# Patient Record
Sex: FEMALE | Race: BLACK OR AFRICAN AMERICAN | NOT HISPANIC OR LATINO | Employment: UNEMPLOYED | URBAN - METROPOLITAN AREA
[De-identification: names, ages, dates, MRNs, and addresses within clinical notes are randomized per-mention and may not be internally consistent; named-entity substitution may affect disease eponyms.]

---

## 2014-08-22 LAB
CRC RECOMMENDATION EXT: NORMAL
CRC RECOMMENDATION EXT: NORMAL

## 2017-01-13 ENCOUNTER — HISTORICAL (OUTPATIENT)
Dept: RADIOLOGY | Facility: HOSPITAL | Age: 57
End: 2017-01-13

## 2017-01-19 ENCOUNTER — HISTORICAL (OUTPATIENT)
Dept: LAB | Facility: HOSPITAL | Age: 57
End: 2017-01-19

## 2017-01-23 ENCOUNTER — HISTORICAL (OUTPATIENT)
Dept: SURGERY | Facility: HOSPITAL | Age: 57
End: 2017-01-23

## 2017-06-25 LAB
COLOR STL: NORMAL
CONSISTENCY STL: NORMAL

## 2017-06-26 ENCOUNTER — HISTORICAL (OUTPATIENT)
Dept: INTERNAL MEDICINE | Facility: CLINIC | Age: 57
End: 2017-06-26

## 2017-06-26 LAB
ABS NEUT (OLG): 4.16 X10(3)/MCL (ref 2.1–9.2)
ALBUMIN SERPL-MCNC: 4.3 GM/DL (ref 3.4–5)
ALBUMIN/GLOB SERPL: 1 RATIO (ref 1–2)
ALP SERPL-CCNC: 65 UNIT/L (ref 20–120)
ALT SERPL-CCNC: 17 UNIT/L
AST SERPL-CCNC: 21 UNIT/L
BASOPHILS # BLD AUTO: 0.06 X10(3)/MCL
BASOPHILS NFR BLD AUTO: 1 % (ref 0–1)
BILIRUB SERPL-MCNC: 0.6 MG/DL
BILIRUBIN DIRECT+TOT PNL SERPL-MCNC: 0.1 MG/DL
BILIRUBIN DIRECT+TOT PNL SERPL-MCNC: 0.5 MG/DL
BUN SERPL-MCNC: 14 MG/DL (ref 7–25)
CALCIUM SERPL-MCNC: 9.4 MG/DL (ref 8.4–10.3)
CHLORIDE SERPL-SCNC: 99 MMOL/L (ref 96–110)
CHOLEST SERPL-MCNC: 201 MG/DL
CHOLEST/HDLC SERPL: 3.6 {RATIO} (ref 0–4.4)
CO2 SERPL-SCNC: 27 MMOL/L (ref 24–32)
CREAT SERPL-MCNC: 1.06 MG/DL (ref 0.7–1.1)
EOSINOPHIL # BLD AUTO: 0.05 X10(3)/MCL
EOSINOPHIL NFR BLD AUTO: 1 % (ref 0–5)
ERYTHROCYTE [DISTWIDTH] IN BLOOD BY AUTOMATED COUNT: 12.1 % (ref 11.5–14.5)
EST. AVERAGE GLUCOSE BLD GHB EST-MCNC: 217 MG/DL
GLOBULIN SER-MCNC: 3.7 GM/ML (ref 2.3–3.5)
GLUCOSE SERPL-MCNC: 141 MG/DL (ref 65–99)
HBA1C MFR BLD: 9.2 % (ref 4.7–5.6)
HCT VFR BLD AUTO: 38 % (ref 35–46)
HDLC SERPL-MCNC: 56 MG/DL
HGB BLD-MCNC: 12.6 GM/DL (ref 12–16)
HIV 1+2 AB+HIV1 P24 AG SERPL QL IA: NONREACTIVE
IMM GRANULOCYTES # BLD AUTO: 0.02 10*3/UL
IMM GRANULOCYTES NFR BLD AUTO: 0 %
LDLC SERPL CALC-MCNC: 122 MG/DL (ref 0–130)
LYMPHOCYTES # BLD AUTO: 3.33 X10(3)/MCL
LYMPHOCYTES NFR BLD AUTO: 41 % (ref 15–40)
MCH RBC QN AUTO: 28.4 PG (ref 26–34)
MCHC RBC AUTO-ENTMCNC: 33.2 GM/DL (ref 31–37)
MCV RBC AUTO: 85.6 FL (ref 80–100)
MONOCYTES # BLD AUTO: 0.5 X10(3)/MCL
MONOCYTES NFR BLD AUTO: 6 % (ref 4–12)
NEUTROPHILS # BLD AUTO: 4.16 X10(3)/MCL
NEUTROPHILS NFR BLD AUTO: 51 X10(3)/MCL
PLATELET # BLD AUTO: 358 X10(3)/MCL (ref 130–400)
PMV BLD AUTO: 10.6 FL (ref 7.4–10.4)
POTASSIUM SERPL-SCNC: 3.7 MMOL/L (ref 3.6–5.2)
PROT SERPL-MCNC: 8 GM/DL (ref 6–8)
RBC # BLD AUTO: 4.44 X10(6)/MCL (ref 4–5.2)
SODIUM SERPL-SCNC: 135 MMOL/L (ref 135–146)
TRIGL SERPL-MCNC: 115 MG/DL
TSH SERPL-ACNC: 0.83 MIU/L (ref 0.5–5)
VLDLC SERPL CALC-MCNC: 23 MG/DL
WBC # SPEC AUTO: 8.1 X10(3)/MCL (ref 4.5–11)

## 2018-01-04 ENCOUNTER — HISTORICAL (OUTPATIENT)
Dept: INTERNAL MEDICINE | Facility: CLINIC | Age: 58
End: 2018-01-04

## 2018-01-04 LAB
ABS NEUT (OLG): 3.91 X10(3)/MCL (ref 2.1–9.2)
ALBUMIN SERPL-MCNC: 3.8 GM/DL (ref 3.4–5)
ALBUMIN/GLOB SERPL: 1 RATIO (ref 1–2)
ALP SERPL-CCNC: 73 UNIT/L (ref 45–117)
ALT SERPL-CCNC: 22 UNIT/L (ref 12–78)
AST SERPL-CCNC: 18 UNIT/L (ref 15–37)
BASOPHILS # BLD AUTO: 0.05 X10(3)/MCL
BASOPHILS NFR BLD AUTO: 1 % (ref 0–1)
BILIRUB SERPL-MCNC: 0.3 MG/DL (ref 0.2–1)
BILIRUBIN DIRECT+TOT PNL SERPL-MCNC: <0.1 MG/DL
BILIRUBIN DIRECT+TOT PNL SERPL-MCNC: ABNORMAL MG/DL
BUN SERPL-MCNC: 11 MG/DL (ref 7–18)
CALCIUM SERPL-MCNC: 8.9 MG/DL (ref 8.5–10.1)
CHLORIDE SERPL-SCNC: 100 MMOL/L (ref 98–107)
CHOLEST SERPL-MCNC: 169 MG/DL
CHOLEST/HDLC SERPL: 2.5 {RATIO} (ref 0–4.4)
CO2 SERPL-SCNC: 29 MMOL/L (ref 21–32)
CREAT SERPL-MCNC: 1 MG/DL (ref 0.6–1.3)
EOSINOPHIL # BLD AUTO: 0.15 X10(3)/MCL
EOSINOPHIL NFR BLD AUTO: 2 % (ref 0–5)
ERYTHROCYTE [DISTWIDTH] IN BLOOD BY AUTOMATED COUNT: 12.3 % (ref 11.5–14.5)
EST. AVERAGE GLUCOSE BLD GHB EST-MCNC: 171 MG/DL
GLOBULIN SER-MCNC: 4.5 GM/ML (ref 2.3–3.5)
GLUCOSE SERPL-MCNC: 114 MG/DL (ref 74–106)
HBA1C MFR BLD: 7.6 % (ref 4.2–6.3)
HCT VFR BLD AUTO: 37.9 % (ref 35–46)
HDLC SERPL-MCNC: 68 MG/DL
HGB BLD-MCNC: 12.3 GM/DL (ref 12–16)
IMM GRANULOCYTES # BLD AUTO: 0.02 10*3/UL
IMM GRANULOCYTES NFR BLD AUTO: 0 %
LDLC SERPL CALC-MCNC: 80 MG/DL (ref 0–130)
LYMPHOCYTES # BLD AUTO: 3.34 X10(3)/MCL
LYMPHOCYTES NFR BLD AUTO: 42 % (ref 15–40)
MCH RBC QN AUTO: 28.7 PG (ref 26–34)
MCHC RBC AUTO-ENTMCNC: 32.5 GM/DL (ref 31–37)
MCV RBC AUTO: 88.3 FL (ref 80–100)
MONOCYTES # BLD AUTO: 0.54 X10(3)/MCL
MONOCYTES NFR BLD AUTO: 7 % (ref 4–12)
NEUTROPHILS # BLD AUTO: 3.91 X10(3)/MCL
NEUTROPHILS NFR BLD AUTO: 49 X10(3)/MCL
PLATELET # BLD AUTO: 348 X10(3)/MCL (ref 130–400)
PMV BLD AUTO: 10.9 FL (ref 7.4–10.4)
POTASSIUM SERPL-SCNC: 3.5 MMOL/L (ref 3.5–5.1)
PROT SERPL-MCNC: 8.3 GM/DL (ref 6.4–8.2)
RBC # BLD AUTO: 4.29 X10(6)/MCL (ref 4–5.2)
SODIUM SERPL-SCNC: 138 MMOL/L (ref 136–145)
TRIGL SERPL-MCNC: 106 MG/DL
TSH SERPL-ACNC: 0.76 MIU/L (ref 0.36–3.74)
VLDLC SERPL CALC-MCNC: 21 MG/DL
WBC # SPEC AUTO: 8 X10(3)/MCL (ref 4.5–11)

## 2018-02-07 ENCOUNTER — HISTORICAL (OUTPATIENT)
Dept: INTERNAL MEDICINE | Facility: CLINIC | Age: 58
End: 2018-02-07

## 2018-06-29 ENCOUNTER — HISTORICAL (OUTPATIENT)
Dept: INTERNAL MEDICINE | Facility: CLINIC | Age: 58
End: 2018-06-29

## 2018-06-29 LAB
ABS NEUT (OLG): 4.01 X10(3)/MCL (ref 2.1–9.2)
ALBUMIN SERPL-MCNC: 4 GM/DL (ref 3.4–5)
ALBUMIN/GLOB SERPL: 1 RATIO (ref 1–2)
ALP SERPL-CCNC: 63 UNIT/L (ref 45–117)
ALT SERPL-CCNC: 23 UNIT/L (ref 12–78)
AST SERPL-CCNC: 18 UNIT/L (ref 15–37)
BASOPHILS # BLD AUTO: 0.05 X10(3)/MCL
BASOPHILS NFR BLD AUTO: 1 %
BILIRUB SERPL-MCNC: 0.4 MG/DL (ref 0.2–1)
BILIRUBIN DIRECT+TOT PNL SERPL-MCNC: 0.1 MG/DL
BILIRUBIN DIRECT+TOT PNL SERPL-MCNC: 0.3 MG/DL
BUN SERPL-MCNC: 13 MG/DL (ref 7–18)
CALCIUM SERPL-MCNC: 9.3 MG/DL (ref 8.5–10.1)
CHLORIDE SERPL-SCNC: 100 MMOL/L (ref 98–107)
CHOLEST SERPL-MCNC: 176 MG/DL
CHOLEST/HDLC SERPL: 3 {RATIO} (ref 0–4.4)
CO2 SERPL-SCNC: 29 MMOL/L (ref 21–32)
CREAT SERPL-MCNC: 1.1 MG/DL (ref 0.6–1.3)
EOSINOPHIL # BLD AUTO: 0.19 X10(3)/MCL
EOSINOPHIL NFR BLD AUTO: 2 %
ERYTHROCYTE [DISTWIDTH] IN BLOOD BY AUTOMATED COUNT: 12.5 % (ref 11.5–14.5)
EST. AVERAGE GLUCOSE BLD GHB EST-MCNC: 154 MG/DL
GLOBULIN SER-MCNC: 4.2 GM/ML (ref 2.3–3.5)
GLUCOSE SERPL-MCNC: 110 MG/DL (ref 74–106)
HBA1C MFR BLD: 7 % (ref 4.2–6.3)
HCT VFR BLD AUTO: 39.3 % (ref 35–46)
HDLC SERPL-MCNC: 59 MG/DL
HGB BLD-MCNC: 13 GM/DL (ref 12–16)
IMM GRANULOCYTES # BLD AUTO: 0.02 10*3/UL
IMM GRANULOCYTES NFR BLD AUTO: 0 %
LDLC SERPL CALC-MCNC: 98 MG/DL (ref 0–130)
LYMPHOCYTES # BLD AUTO: 3.94 X10(3)/MCL
LYMPHOCYTES NFR BLD AUTO: 45 % (ref 13–40)
MCH RBC QN AUTO: 28.8 PG (ref 26–34)
MCHC RBC AUTO-ENTMCNC: 33.1 GM/DL (ref 31–37)
MCV RBC AUTO: 86.9 FL (ref 80–100)
MONOCYTES # BLD AUTO: 0.58 X10(3)/MCL
MONOCYTES NFR BLD AUTO: 7 % (ref 4–12)
NEUTROPHILS # BLD AUTO: 4.01 X10(3)/MCL
NEUTROPHILS NFR BLD AUTO: 46 X10(3)/MCL
PLATELET # BLD AUTO: 343 X10(3)/MCL (ref 130–400)
PMV BLD AUTO: 9.9 FL (ref 7.4–10.4)
POTASSIUM SERPL-SCNC: 3.8 MMOL/L (ref 3.5–5.1)
PROT SERPL-MCNC: 8.2 GM/DL (ref 6.4–8.2)
RBC # BLD AUTO: 4.52 X10(6)/MCL (ref 4–5.2)
SODIUM SERPL-SCNC: 134 MMOL/L (ref 136–145)
TRIGL SERPL-MCNC: 93 MG/DL
TSH SERPL-ACNC: 1.14 MIU/L (ref 0.36–3.74)
VLDLC SERPL CALC-MCNC: 19 MG/DL
WBC # SPEC AUTO: 8.8 X10(3)/MCL (ref 4.5–11)

## 2019-01-02 ENCOUNTER — HISTORICAL (OUTPATIENT)
Dept: INTERNAL MEDICINE | Facility: CLINIC | Age: 59
End: 2019-01-02

## 2019-01-02 LAB
ABS NEUT (OLG): 4.17 X10(3)/MCL (ref 2.1–9.2)
ALBUMIN SERPL-MCNC: 3.7 GM/DL (ref 3.4–5)
ALBUMIN/GLOB SERPL: 1 RATIO (ref 1–2)
ALP SERPL-CCNC: 62 UNIT/L (ref 45–117)
ALT SERPL-CCNC: 20 UNIT/L (ref 12–78)
AST SERPL-CCNC: 17 UNIT/L (ref 15–37)
BASOPHILS # BLD AUTO: 0.05 X10(3)/MCL
BASOPHILS NFR BLD AUTO: 1 %
BILIRUB SERPL-MCNC: 0.4 MG/DL (ref 0.2–1)
BILIRUBIN DIRECT+TOT PNL SERPL-MCNC: 0.1 MG/DL
BILIRUBIN DIRECT+TOT PNL SERPL-MCNC: 0.3 MG/DL
BUN SERPL-MCNC: 15 MG/DL (ref 7–18)
CALCIUM SERPL-MCNC: 8.9 MG/DL (ref 8.5–10.1)
CHLORIDE SERPL-SCNC: 99 MMOL/L (ref 98–107)
CHOLEST SERPL-MCNC: 206 MG/DL
CHOLEST/HDLC SERPL: 3.4 {RATIO} (ref 0–4.4)
CO2 SERPL-SCNC: 28 MMOL/L (ref 21–32)
CREAT SERPL-MCNC: 1 MG/DL (ref 0.6–1.3)
EOSINOPHIL # BLD AUTO: 0.14 10*3/UL
EOSINOPHIL NFR BLD AUTO: 2 %
ERYTHROCYTE [DISTWIDTH] IN BLOOD BY AUTOMATED COUNT: 12.1 % (ref 11.5–14.5)
EST. AVERAGE GLUCOSE BLD GHB EST-MCNC: 163 MG/DL
GLOBULIN SER-MCNC: 4.5 GM/ML (ref 2.3–3.5)
GLUCOSE SERPL-MCNC: 131 MG/DL (ref 74–106)
HBA1C MFR BLD: 7.3 % (ref 4.2–6.3)
HCT VFR BLD AUTO: 38 % (ref 35–46)
HDLC SERPL-MCNC: 60 MG/DL
HGB BLD-MCNC: 12 GM/DL (ref 12–16)
IMM GRANULOCYTES # BLD AUTO: 0.03 10*3/UL
IMM GRANULOCYTES NFR BLD AUTO: 0 %
LDLC SERPL CALC-MCNC: 124 MG/DL (ref 0–130)
LYMPHOCYTES # BLD AUTO: 3.33 X10(3)/MCL
LYMPHOCYTES NFR BLD AUTO: 39 % (ref 13–40)
MCH RBC QN AUTO: 27.8 PG (ref 26–34)
MCHC RBC AUTO-ENTMCNC: 31.6 GM/DL (ref 31–37)
MCV RBC AUTO: 88 FL (ref 80–100)
MONOCYTES # BLD AUTO: 0.79 X10(3)/MCL
MONOCYTES NFR BLD AUTO: 9 % (ref 4–12)
NEUTROPHILS # BLD AUTO: 4.17 X10(3)/MCL
NEUTROPHILS NFR BLD AUTO: 49 X10(3)/MCL
PLATELET # BLD AUTO: 329 X10(3)/MCL (ref 130–400)
PMV BLD AUTO: 10.6 FL (ref 7.4–10.4)
POTASSIUM SERPL-SCNC: 3.6 MMOL/L (ref 3.5–5.1)
PROT SERPL-MCNC: 8.2 GM/DL (ref 6.4–8.2)
RBC # BLD AUTO: 4.32 X10(6)/MCL (ref 4–5.2)
SODIUM SERPL-SCNC: 135 MMOL/L (ref 136–145)
TRIGL SERPL-MCNC: 108 MG/DL
TSH SERPL-ACNC: 1.43 MIU/L (ref 0.36–3.74)
VLDLC SERPL CALC-MCNC: 22 MG/DL
WBC # SPEC AUTO: 8.5 X10(3)/MCL (ref 4.5–11)

## 2019-01-09 ENCOUNTER — HISTORICAL (OUTPATIENT)
Dept: ADMINISTRATIVE | Facility: HOSPITAL | Age: 59
End: 2019-01-09

## 2019-02-20 ENCOUNTER — HISTORICAL (OUTPATIENT)
Dept: CARDIOLOGY | Facility: HOSPITAL | Age: 59
End: 2019-02-20

## 2019-06-24 ENCOUNTER — HISTORICAL (OUTPATIENT)
Dept: INTERNAL MEDICINE | Facility: CLINIC | Age: 59
End: 2019-06-24

## 2019-06-24 LAB
ABS NEUT (OLG): 3.34 X10(3)/MCL (ref 2.1–9.2)
ALBUMIN SERPL-MCNC: 4 GM/DL (ref 3.4–5)
ALBUMIN/GLOB SERPL: 1.1 RATIO (ref 1.1–2)
ALP SERPL-CCNC: 55 UNIT/L (ref 45–117)
ALT SERPL-CCNC: 22 UNIT/L (ref 12–78)
AST SERPL-CCNC: 17 UNIT/L (ref 15–37)
BASOPHILS # BLD AUTO: 0.05 X10(3)/MCL
BASOPHILS NFR BLD AUTO: 1 %
BILIRUB SERPL-MCNC: 0.4 MG/DL (ref 0.2–1)
BILIRUBIN DIRECT+TOT PNL SERPL-MCNC: 0.1 MG/DL
BILIRUBIN DIRECT+TOT PNL SERPL-MCNC: 0.3 MG/DL
BUN SERPL-MCNC: 11 MG/DL (ref 7–18)
CALCIUM SERPL-MCNC: 9 MG/DL (ref 8.5–10.1)
CHLORIDE SERPL-SCNC: 104 MMOL/L (ref 98–107)
CHOLEST SERPL-MCNC: 190 MG/DL
CHOLEST/HDLC SERPL: 3.7 {RATIO} (ref 0–4.4)
CO2 SERPL-SCNC: 29 MMOL/L (ref 21–32)
CREAT SERPL-MCNC: 1 MG/DL (ref 0.6–1.3)
EOSINOPHIL # BLD AUTO: 0.14 10*3/UL
EOSINOPHIL NFR BLD AUTO: 2 %
ERYTHROCYTE [DISTWIDTH] IN BLOOD BY AUTOMATED COUNT: 12.3 % (ref 11.5–14.5)
EST. AVERAGE GLUCOSE BLD GHB EST-MCNC: 183 MG/DL
GLOBULIN SER-MCNC: 3.8 GM/ML (ref 2.3–3.5)
GLUCOSE SERPL-MCNC: 95 MG/DL (ref 74–106)
HBA1C MFR BLD: 8 % (ref 4.2–6.3)
HCT VFR BLD AUTO: 38.6 % (ref 35–46)
HDLC SERPL-MCNC: 51 MG/DL
HGB BLD-MCNC: 12.5 GM/DL (ref 12–16)
IMM GRANULOCYTES # BLD AUTO: 0.02 10*3/UL
IMM GRANULOCYTES NFR BLD AUTO: 0 %
LDLC SERPL CALC-MCNC: 112 MG/DL (ref 0–130)
LYMPHOCYTES # BLD AUTO: 3.51 X10(3)/MCL
LYMPHOCYTES NFR BLD AUTO: 46 % (ref 13–40)
MCH RBC QN AUTO: 28.5 PG (ref 26–34)
MCHC RBC AUTO-ENTMCNC: 32.4 GM/DL (ref 31–37)
MCV RBC AUTO: 88.1 FL (ref 80–100)
MONOCYTES # BLD AUTO: 0.58 X10(3)/MCL
MONOCYTES NFR BLD AUTO: 8 % (ref 4–12)
NEUTROPHILS # BLD AUTO: 3.34 X10(3)/MCL
NEUTROPHILS NFR BLD AUTO: 44 X10(3)/MCL
PLATELET # BLD AUTO: 334 X10(3)/MCL (ref 130–400)
PMV BLD AUTO: 10.5 FL (ref 7.4–10.4)
POTASSIUM SERPL-SCNC: 3.9 MMOL/L (ref 3.5–5.1)
PROT SERPL-MCNC: 7.8 GM/DL (ref 6.4–8.2)
RBC # BLD AUTO: 4.38 X10(6)/MCL (ref 4–5.2)
SODIUM SERPL-SCNC: 137 MMOL/L (ref 136–145)
TRIGL SERPL-MCNC: 136 MG/DL
TSH SERPL-ACNC: 0.52 MIU/L (ref 0.36–3.74)
VLDLC SERPL CALC-MCNC: 27 MG/DL
WBC # SPEC AUTO: 7.6 X10(3)/MCL (ref 4.5–11)

## 2019-08-01 ENCOUNTER — HISTORICAL (OUTPATIENT)
Dept: INTERNAL MEDICINE | Facility: CLINIC | Age: 59
End: 2019-08-01

## 2019-08-01 LAB
CREAT UR-MCNC: 169 MG/DL
MICROALBUMIN UR-MCNC: 9.3 MG/L (ref 0–19)
MICROALBUMIN/CREAT RATIO PNL UR: 5.5 MCG/MG CR (ref 0–29)

## 2020-01-27 ENCOUNTER — HISTORICAL (OUTPATIENT)
Dept: INTERNAL MEDICINE | Facility: CLINIC | Age: 60
End: 2020-01-27

## 2020-01-27 LAB
ABS NEUT (OLG): 4.86 X10(3)/MCL (ref 2.1–9.2)
ALBUMIN SERPL-MCNC: 3.7 GM/DL (ref 3.4–5)
ALBUMIN/GLOB SERPL: 0.9 RATIO (ref 1.1–2)
ALP SERPL-CCNC: 63 UNIT/L (ref 45–117)
ALT SERPL-CCNC: 19 UNIT/L (ref 12–78)
AST SERPL-CCNC: 12 UNIT/L (ref 15–37)
BASOPHILS # BLD AUTO: 0.1 X10(3)/MCL (ref 0–0.2)
BASOPHILS NFR BLD AUTO: 1 %
BILIRUB SERPL-MCNC: 0.4 MG/DL (ref 0.2–1)
BILIRUBIN DIRECT+TOT PNL SERPL-MCNC: 0.1 MG/DL (ref 0–0.2)
BILIRUBIN DIRECT+TOT PNL SERPL-MCNC: 0.3 MG/DL
BUN SERPL-MCNC: 16 MG/DL (ref 7–18)
CALCIUM SERPL-MCNC: 9.6 MG/DL (ref 8.5–10.1)
CHLORIDE SERPL-SCNC: 99 MMOL/L (ref 98–107)
CHOLEST SERPL-MCNC: 204 MG/DL
CHOLEST/HDLC SERPL: 3.8 {RATIO} (ref 0–4.4)
CO2 SERPL-SCNC: 27 MMOL/L (ref 21–32)
CREAT SERPL-MCNC: 1.1 MG/DL (ref 0.6–1.3)
EOSINOPHIL # BLD AUTO: 0.2 X10(3)/MCL (ref 0–0.9)
EOSINOPHIL NFR BLD AUTO: 2 %
ERYTHROCYTE [DISTWIDTH] IN BLOOD BY AUTOMATED COUNT: 12.2 % (ref 11.5–14.5)
EST. AVERAGE GLUCOSE BLD GHB EST-MCNC: 194 MG/DL
GLOBULIN SER-MCNC: 4.1 GM/ML (ref 2.3–3.5)
GLUCOSE SERPL-MCNC: 231 MG/DL (ref 74–106)
HBA1C MFR BLD: 8.4 % (ref 4.2–6.3)
HCT VFR BLD AUTO: 37.9 % (ref 35–46)
HDLC SERPL-MCNC: 54 MG/DL (ref 40–59)
HGB BLD-MCNC: 12.2 GM/DL (ref 12–16)
IMM GRANULOCYTES # BLD AUTO: 0.03 10*3/UL
IMM GRANULOCYTES NFR BLD AUTO: 0 %
LDLC SERPL CALC-MCNC: 114 MG/DL
LYMPHOCYTES # BLD AUTO: 3.1 X10(3)/MCL (ref 0.6–4.6)
LYMPHOCYTES NFR BLD AUTO: 34 %
MCH RBC QN AUTO: 28.2 PG (ref 26–34)
MCHC RBC AUTO-ENTMCNC: 32.2 GM/DL (ref 31–37)
MCV RBC AUTO: 87.5 FL (ref 80–100)
MONOCYTES # BLD AUTO: 0.7 X10(3)/MCL (ref 0.1–1.3)
MONOCYTES NFR BLD AUTO: 8 %
NEUTROPHILS # BLD AUTO: 4.86 X10(3)/MCL (ref 2.1–9.2)
NEUTROPHILS NFR BLD AUTO: 54 %
PLATELET # BLD AUTO: 316 X10(3)/MCL (ref 130–400)
PMV BLD AUTO: 10.7 FL (ref 7.4–10.4)
POTASSIUM SERPL-SCNC: 3.6 MMOL/L (ref 3.5–5.1)
PROT SERPL-MCNC: 7.8 GM/DL (ref 6.4–8.2)
RBC # BLD AUTO: 4.33 X10(6)/MCL (ref 4–5.2)
SODIUM SERPL-SCNC: 135 MMOL/L (ref 136–145)
TRIGL SERPL-MCNC: 179 MG/DL
TSH SERPL-ACNC: 1.59 MIU/L (ref 0.36–3.74)
VLDLC SERPL CALC-MCNC: 36 MG/DL
WBC # SPEC AUTO: 8.9 X10(3)/MCL (ref 4.5–11)

## 2020-06-08 ENCOUNTER — HISTORICAL (OUTPATIENT)
Dept: RADIOLOGY | Facility: HOSPITAL | Age: 60
End: 2020-06-08

## 2020-08-11 ENCOUNTER — HISTORICAL (OUTPATIENT)
Dept: INTERNAL MEDICINE | Facility: CLINIC | Age: 60
End: 2020-08-11

## 2020-08-11 LAB
ABS NEUT (OLG): 3.28 X10(3)/MCL (ref 2.1–9.2)
ALBUMIN SERPL-MCNC: 3.4 GM/DL (ref 3.4–5)
ALBUMIN/GLOB SERPL: 0.8 RATIO (ref 1.1–2)
ALP SERPL-CCNC: 84 UNIT/L (ref 45–117)
ALT SERPL-CCNC: 22 UNIT/L (ref 12–78)
AST SERPL-CCNC: 20 UNIT/L (ref 15–37)
BASOPHILS # BLD AUTO: 0 X10(3)/MCL (ref 0–0.2)
BASOPHILS NFR BLD AUTO: 0 %
BILIRUB SERPL-MCNC: 0.3 MG/DL (ref 0.2–1)
BILIRUBIN DIRECT+TOT PNL SERPL-MCNC: 0.1 MG/DL (ref 0–0.2)
BILIRUBIN DIRECT+TOT PNL SERPL-MCNC: 0.2 MG/DL
BUN SERPL-MCNC: 23 MG/DL (ref 7–18)
CALCIUM SERPL-MCNC: 8.9 MG/DL (ref 8.5–10.1)
CHLORIDE SERPL-SCNC: 107 MMOL/L (ref 98–107)
CHOLEST SERPL-MCNC: 176 MG/DL
CHOLEST/HDLC SERPL: 4.4 {RATIO} (ref 0–4.4)
CO2 SERPL-SCNC: 29 MMOL/L (ref 21–32)
CREAT SERPL-MCNC: 1.6 MG/DL (ref 0.6–1.3)
EOSINOPHIL # BLD AUTO: 0 X10(3)/MCL (ref 0–0.9)
EOSINOPHIL NFR BLD AUTO: 1 %
ERYTHROCYTE [DISTWIDTH] IN BLOOD BY AUTOMATED COUNT: 12.4 % (ref 11.5–14.5)
EST. AVERAGE GLUCOSE BLD GHB EST-MCNC: 269 MG/DL
GLOBULIN SER-MCNC: 4.4 GM/ML (ref 2.3–3.5)
GLUCOSE SERPL-MCNC: 233 MG/DL (ref 74–106)
HBA1C MFR BLD: 11 % (ref 4.2–6.3)
HCT VFR BLD AUTO: 34.2 % (ref 35–46)
HDLC SERPL-MCNC: 40 MG/DL (ref 40–59)
HGB BLD-MCNC: 10.8 GM/DL (ref 12–16)
IMM GRANULOCYTES # BLD AUTO: 0.1 10*3/UL
IMM GRANULOCYTES NFR BLD AUTO: 2 %
LDLC SERPL CALC-MCNC: 110 MG/DL
LYMPHOCYTES # BLD AUTO: 2.2 X10(3)/MCL (ref 0.6–4.6)
LYMPHOCYTES NFR BLD AUTO: 36 %
MCH RBC QN AUTO: 27.5 PG (ref 26–34)
MCHC RBC AUTO-ENTMCNC: 31.6 GM/DL (ref 31–37)
MCV RBC AUTO: 87 FL (ref 80–100)
MONOCYTES # BLD AUTO: 0.6 X10(3)/MCL (ref 0.1–1.3)
MONOCYTES NFR BLD AUTO: 9 %
NEUTROPHILS # BLD AUTO: 3.28 X10(3)/MCL (ref 2.1–9.2)
NEUTROPHILS NFR BLD AUTO: 52 %
PLATELET # BLD AUTO: 308 X10(3)/MCL (ref 130–400)
PMV BLD AUTO: 10.6 FL (ref 7.4–10.4)
POTASSIUM SERPL-SCNC: 4.7 MMOL/L (ref 3.5–5.1)
PROT SERPL-MCNC: 7.8 GM/DL (ref 6.4–8.2)
RBC # BLD AUTO: 3.93 X10(6)/MCL (ref 4–5.2)
SODIUM SERPL-SCNC: 139 MMOL/L (ref 136–145)
TRIGL SERPL-MCNC: 130 MG/DL
TSH SERPL-ACNC: 1.59 MIU/L (ref 0.36–3.74)
VLDLC SERPL CALC-MCNC: 26 MG/DL
WBC # SPEC AUTO: 6.3 X10(3)/MCL (ref 4.5–11)

## 2020-10-27 ENCOUNTER — HISTORICAL (OUTPATIENT)
Dept: NEPHROLOGY | Facility: CLINIC | Age: 60
End: 2020-10-27

## 2020-10-27 LAB
ABS NEUT (OLG): 4.24 X10(3)/MCL (ref 2.1–9.2)
ALBUMIN SERPL-MCNC: 4.1 GM/DL (ref 3.4–4.8)
ALBUMIN/GLOB SERPL: 1 RATIO (ref 1.1–2)
ALP SERPL-CCNC: 77 UNIT/L (ref 40–150)
ALT SERPL-CCNC: 11 UNIT/L (ref 0–55)
APPEARANCE, UA: CLEAR
AST SERPL-CCNC: 15 UNIT/L (ref 5–34)
BACTERIA #/AREA URNS AUTO: ABNORMAL /HPF
BASOPHILS # BLD AUTO: 0.1 X10(3)/MCL (ref 0–0.2)
BASOPHILS NFR BLD AUTO: 1 %
BILIRUB SERPL-MCNC: 0.6 MG/DL
BILIRUB UR QL STRIP: NEGATIVE
BILIRUBIN DIRECT+TOT PNL SERPL-MCNC: 0.2 MG/DL (ref 0–0.5)
BILIRUBIN DIRECT+TOT PNL SERPL-MCNC: 0.4 MG/DL (ref 0–0.8)
BUN SERPL-MCNC: 14 MG/DL (ref 9.8–20.1)
CALCIUM SERPL-MCNC: 9.6 MG/DL (ref 8.4–10.2)
CHLORIDE SERPL-SCNC: 95 MMOL/L (ref 98–107)
CO2 SERPL-SCNC: 30 MMOL/L (ref 23–31)
COLOR UR: NORMAL
CREAT SERPL-MCNC: 1.44 MG/DL (ref 0.55–1.02)
CREAT UR-MCNC: 112.8 MG/DL (ref 45–106)
DEPRECATED CALCIDIOL+CALCIFEROL SERPL-MC: 18.9 NG/ML (ref 30–80)
EOSINOPHIL # BLD AUTO: 0.2 X10(3)/MCL (ref 0–0.9)
EOSINOPHIL NFR BLD AUTO: 2 %
ERYTHROCYTE [DISTWIDTH] IN BLOOD BY AUTOMATED COUNT: 12.7 % (ref 11.5–14.5)
FERRITIN SERPL-MCNC: 316.52 NG/ML (ref 4.63–204)
GLOBULIN SER-MCNC: 4 GM/DL (ref 2.4–3.5)
GLUCOSE (UA): NEGATIVE
GLUCOSE SERPL-MCNC: 162 MG/DL (ref 82–115)
HCT VFR BLD AUTO: 37.8 % (ref 35–46)
HGB BLD-MCNC: 11.9 GM/DL (ref 12–16)
HGB UR QL STRIP: NEGATIVE
HYALINE CASTS #/AREA URNS LPF: ABNORMAL /LPF
IMM GRANULOCYTES # BLD AUTO: 0.03 10*3/UL
IMM GRANULOCYTES NFR BLD AUTO: 0 %
IRON SATN MFR SERPL: 41 % (ref 20–50)
IRON SERPL-MCNC: 98 UG/DL (ref 50–170)
KETONES UR QL STRIP: NEGATIVE
LEUKOCYTE ESTERASE UR QL STRIP: NEGATIVE
LYMPHOCYTES # BLD AUTO: 4.2 X10(3)/MCL (ref 0.6–4.6)
LYMPHOCYTES NFR BLD AUTO: 44 %
MAGNESIUM SERPL-MCNC: 2.04 MG/DL (ref 1.6–2.6)
MCH RBC QN AUTO: 27.1 PG (ref 26–34)
MCHC RBC AUTO-ENTMCNC: 31.5 GM/DL (ref 31–37)
MCV RBC AUTO: 86.1 FL (ref 80–100)
MONOCYTES # BLD AUTO: 0.8 X10(3)/MCL (ref 0.1–1.3)
MONOCYTES NFR BLD AUTO: 8 %
NEUTROPHILS # BLD AUTO: 4.24 X10(3)/MCL (ref 2.1–9.2)
NEUTROPHILS NFR BLD AUTO: 44 %
NITRITE UR QL STRIP: NEGATIVE
PH UR STRIP: 7 [PH] (ref 4.5–8)
PHOSPHATE SERPL-MCNC: 3.3 MG/DL (ref 2.3–4.7)
PLATELET # BLD AUTO: 305 X10(3)/MCL (ref 130–400)
PMV BLD AUTO: 11.2 FL (ref 7.4–10.4)
POTASSIUM SERPL-SCNC: 4 MMOL/L (ref 3.5–5.1)
PROT SERPL-MCNC: 8.1 GM/DL (ref 5.8–7.6)
PROT UR QL STRIP: NEGATIVE
PROT UR STRIP-MCNC: 9 MG/DL
PROT/CREAT UR-RTO: 79.8 MG/GM
PTH-INTACT SERPL-MCNC: 35.4 PG/ML (ref 18.4–80.1)
RBC # BLD AUTO: 4.39 X10(6)/MCL (ref 4–5.2)
RBC #/AREA URNS AUTO: ABNORMAL /HPF
SODIUM SERPL-SCNC: 131 MMOL/L (ref 136–145)
SP GR UR STRIP: 1.01 (ref 1–1.03)
SQUAMOUS #/AREA URNS LPF: ABNORMAL /LPF
TIBC SERPL-MCNC: 143 UG/DL (ref 70–310)
TIBC SERPL-MCNC: 241 UG/DL (ref 250–450)
TRANSFERRIN SERPL-MCNC: 210 MG/DL (ref 173–360)
UROBILINOGEN UR STRIP-ACNC: NORMAL
WBC # SPEC AUTO: 9.6 X10(3)/MCL (ref 4.5–11)
WBC #/AREA URNS AUTO: ABNORMAL /HPF

## 2021-01-05 ENCOUNTER — HISTORICAL (OUTPATIENT)
Dept: RADIOLOGY | Facility: HOSPITAL | Age: 61
End: 2021-01-05

## 2021-03-03 ENCOUNTER — HISTORICAL (OUTPATIENT)
Dept: LAB | Facility: HOSPITAL | Age: 61
End: 2021-03-03

## 2021-03-03 LAB
ABS NEUT (OLG): 4.95 X10(3)/MCL (ref 2.1–9.2)
ALBUMIN SERPL-MCNC: 4.3 GM/DL (ref 3.4–4.8)
ALBUMIN/GLOB SERPL: 1.3 RATIO (ref 1.1–2)
ALP SERPL-CCNC: 76 UNIT/L (ref 40–150)
ALT SERPL-CCNC: 13 UNIT/L (ref 0–55)
AST SERPL-CCNC: 16 UNIT/L (ref 5–34)
BASOPHILS # BLD AUTO: 0.1 X10(3)/MCL (ref 0–0.2)
BASOPHILS NFR BLD AUTO: 1 %
BILIRUB SERPL-MCNC: 0.5 MG/DL
BILIRUBIN DIRECT+TOT PNL SERPL-MCNC: 0.2 MG/DL (ref 0–0.5)
BILIRUBIN DIRECT+TOT PNL SERPL-MCNC: 0.3 MG/DL (ref 0–0.8)
BUN SERPL-MCNC: 14.1 MG/DL (ref 9.8–20.1)
CALCIUM SERPL-MCNC: 9.8 MG/DL (ref 8.4–10.2)
CHLORIDE SERPL-SCNC: 104 MMOL/L (ref 98–107)
CHOLEST SERPL-MCNC: 146 MG/DL
CHOLEST/HDLC SERPL: 3 {RATIO} (ref 0–5)
CO2 SERPL-SCNC: 29 MMOL/L (ref 23–31)
CREAT SERPL-MCNC: 1.18 MG/DL (ref 0.55–1.02)
EOSINOPHIL # BLD AUTO: 0.1 X10(3)/MCL (ref 0–0.9)
EOSINOPHIL NFR BLD AUTO: 1 %
ERYTHROCYTE [DISTWIDTH] IN BLOOD BY AUTOMATED COUNT: 12.8 % (ref 11.5–14.5)
EST. AVERAGE GLUCOSE BLD GHB EST-MCNC: 168.6 MG/DL
GLOBULIN SER-MCNC: 3.4 GM/DL (ref 2.4–3.5)
GLUCOSE SERPL-MCNC: 64 MG/DL (ref 82–115)
HBA1C MFR BLD: 7.5 %
HCT VFR BLD AUTO: 40.2 % (ref 35–46)
HDLC SERPL-MCNC: 52 MG/DL (ref 35–60)
HGB BLD-MCNC: 12.5 GM/DL (ref 12–16)
IMM GRANULOCYTES # BLD AUTO: 0.03 10*3/UL
IMM GRANULOCYTES NFR BLD AUTO: 0 %
LDLC SERPL CALC-MCNC: 68 MG/DL (ref 50–140)
LYMPHOCYTES # BLD AUTO: 3.9 X10(3)/MCL (ref 0.6–4.6)
LYMPHOCYTES NFR BLD AUTO: 40 %
MCH RBC QN AUTO: 28 PG (ref 26–34)
MCHC RBC AUTO-ENTMCNC: 31.1 GM/DL (ref 31–37)
MCV RBC AUTO: 89.9 FL (ref 80–100)
MONOCYTES # BLD AUTO: 0.6 X10(3)/MCL (ref 0.1–1.3)
MONOCYTES NFR BLD AUTO: 6 %
NEUTROPHILS # BLD AUTO: 4.95 X10(3)/MCL (ref 2.1–9.2)
NEUTROPHILS NFR BLD AUTO: 51 %
PLATELET # BLD AUTO: 318 X10(3)/MCL (ref 130–400)
PMV BLD AUTO: 11 FL (ref 7.4–10.4)
POTASSIUM SERPL-SCNC: 4.2 MMOL/L (ref 3.5–5.1)
PROT SERPL-MCNC: 7.7 GM/DL (ref 5.8–7.6)
RBC # BLD AUTO: 4.47 X10(6)/MCL (ref 4–5.2)
SODIUM SERPL-SCNC: 142 MMOL/L (ref 136–145)
T4 FREE SERPL-MCNC: 0.97 NG/DL (ref 0.7–1.48)
TRIGL SERPL-MCNC: 128 MG/DL (ref 37–140)
TSH SERPL-ACNC: 1.34 UIU/ML (ref 0.35–4.94)
VLDLC SERPL CALC-MCNC: 26 MG/DL
WBC # SPEC AUTO: 9.7 X10(3)/MCL (ref 4.5–11)

## 2021-04-05 ENCOUNTER — HISTORICAL (OUTPATIENT)
Dept: RADIOLOGY | Facility: HOSPITAL | Age: 61
End: 2021-04-05

## 2021-06-03 ENCOUNTER — HISTORICAL (OUTPATIENT)
Dept: RADIOLOGY | Facility: HOSPITAL | Age: 61
End: 2021-06-03

## 2021-06-03 ENCOUNTER — HISTORICAL (OUTPATIENT)
Dept: CARDIOLOGY | Facility: HOSPITAL | Age: 61
End: 2021-06-03

## 2021-06-03 LAB
ABS NEUT (OLG): 4.91 X10(3)/MCL (ref 2.1–9.2)
APTT PPP: 26 SECOND(S) (ref 23.3–37)
BASOPHILS # BLD AUTO: 0 X10(3)/MCL (ref 0–0.2)
BASOPHILS NFR BLD AUTO: 0 %
BUN SERPL-MCNC: 19.9 MG/DL (ref 9.8–20.1)
CALCIUM SERPL-MCNC: 9.9 MG/DL (ref 8.4–10.2)
CHLORIDE SERPL-SCNC: 107 MMOL/L (ref 98–107)
CO2 SERPL-SCNC: 25 MMOL/L (ref 23–31)
CREAT SERPL-MCNC: 1.26 MG/DL (ref 0.55–1.02)
CREAT/UREA NIT SERPL: 16
EOSINOPHIL # BLD AUTO: 0.2 X10(3)/MCL (ref 0–0.9)
EOSINOPHIL NFR BLD AUTO: 2 %
ERYTHROCYTE [DISTWIDTH] IN BLOOD BY AUTOMATED COUNT: 13.1 % (ref 11.5–14.5)
GLUCOSE SERPL-MCNC: 149 MG/DL (ref 82–115)
HCT VFR BLD AUTO: 41.1 % (ref 35–46)
HGB BLD-MCNC: 12.6 GM/DL (ref 12–16)
IMM GRANULOCYTES # BLD AUTO: 0.04 10*3/UL
IMM GRANULOCYTES NFR BLD AUTO: 0 %
INR PPP: 0.99 (ref 0.9–1.2)
LYMPHOCYTES # BLD AUTO: 3 X10(3)/MCL (ref 0.6–4.6)
LYMPHOCYTES NFR BLD AUTO: 34 %
MCH RBC QN AUTO: 27.6 PG (ref 26–34)
MCHC RBC AUTO-ENTMCNC: 30.7 GM/DL (ref 31–37)
MCV RBC AUTO: 90.1 FL (ref 80–100)
MONOCYTES # BLD AUTO: 0.7 X10(3)/MCL (ref 0.1–1.3)
MONOCYTES NFR BLD AUTO: 8 %
NEUTROPHILS # BLD AUTO: 4.91 X10(3)/MCL (ref 2.1–9.2)
NEUTROPHILS NFR BLD AUTO: 55 %
NRBC BLD AUTO-RTO: 0 % (ref 0–0.2)
PLATELET # BLD AUTO: 297 X10(3)/MCL (ref 130–400)
PMV BLD AUTO: 10.9 FL (ref 7.4–10.4)
POTASSIUM SERPL-SCNC: 4 MMOL/L (ref 3.5–5.1)
PROTHROMBIN TIME: 12.9 SECOND(S) (ref 11.9–14.4)
RBC # BLD AUTO: 4.56 X10(6)/MCL (ref 4–5.2)
SODIUM SERPL-SCNC: 141 MMOL/L (ref 136–145)
WBC # SPEC AUTO: 9 X10(3)/MCL (ref 4.5–11)

## 2021-06-07 ENCOUNTER — HISTORICAL (OUTPATIENT)
Dept: CARDIOLOGY | Facility: HOSPITAL | Age: 61
End: 2021-06-07

## 2021-09-23 ENCOUNTER — HISTORICAL (OUTPATIENT)
Dept: ADMINISTRATIVE | Facility: HOSPITAL | Age: 61
End: 2021-09-23

## 2021-09-23 LAB
ABS NEUT (OLG): 5.47 X10(3)/MCL (ref 2.1–9.2)
ALBUMIN SERPL-MCNC: 4 GM/DL (ref 3.4–4.8)
ALBUMIN/GLOB SERPL: 1.1 RATIO (ref 1.1–2)
ALP SERPL-CCNC: 69 UNIT/L (ref 40–150)
ALT SERPL-CCNC: 10 UNIT/L (ref 0–55)
AST SERPL-CCNC: 16 UNIT/L (ref 5–34)
BASOPHILS # BLD AUTO: 0.1 X10(3)/MCL (ref 0–0.2)
BASOPHILS NFR BLD AUTO: 1 %
BILIRUB SERPL-MCNC: 0.6 MG/DL
BILIRUBIN DIRECT+TOT PNL SERPL-MCNC: 0.2 MG/DL (ref 0–0.5)
BILIRUBIN DIRECT+TOT PNL SERPL-MCNC: 0.4 MG/DL (ref 0–0.8)
BUN SERPL-MCNC: 10.2 MG/DL (ref 9.8–20.1)
CALCIUM SERPL-MCNC: 9.7 MG/DL (ref 8.4–10.2)
CHLORIDE SERPL-SCNC: 104 MMOL/L (ref 98–107)
CHOLEST SERPL-MCNC: 211 MG/DL
CHOLEST/HDLC SERPL: 4 {RATIO} (ref 0–5)
CO2 SERPL-SCNC: 27 MMOL/L (ref 23–31)
CREAT SERPL-MCNC: 1.28 MG/DL (ref 0.55–1.02)
EOSINOPHIL # BLD AUTO: 0.1 X10(3)/MCL (ref 0–0.9)
EOSINOPHIL NFR BLD AUTO: 1 %
ERYTHROCYTE [DISTWIDTH] IN BLOOD BY AUTOMATED COUNT: 13.2 % (ref 11.5–14.5)
EST. AVERAGE GLUCOSE BLD GHB EST-MCNC: 159.9 MG/DL
GLOBULIN SER-MCNC: 3.8 GM/DL (ref 2.4–3.5)
GLUCOSE SERPL-MCNC: 112 MG/DL (ref 82–115)
HBA1C MFR BLD: 7.2 %
HCT VFR BLD AUTO: 43.7 % (ref 35–46)
HDLC SERPL-MCNC: 56 MG/DL (ref 35–60)
HGB BLD-MCNC: 13.7 GM/DL (ref 12–16)
IMM GRANULOCYTES # BLD AUTO: 0.03 10*3/UL
IMM GRANULOCYTES NFR BLD AUTO: 0 %
LDLC SERPL CALC-MCNC: 129 MG/DL (ref 50–140)
LYMPHOCYTES # BLD AUTO: 3.6 X10(3)/MCL (ref 0.6–4.6)
LYMPHOCYTES NFR BLD AUTO: 36 %
MCH RBC QN AUTO: 28.4 PG (ref 26–34)
MCHC RBC AUTO-ENTMCNC: 31.4 GM/DL (ref 31–37)
MCV RBC AUTO: 90.5 FL (ref 80–100)
MONOCYTES # BLD AUTO: 0.8 X10(3)/MCL (ref 0.1–1.3)
MONOCYTES NFR BLD AUTO: 8 %
NEUTROPHILS # BLD AUTO: 5.47 X10(3)/MCL (ref 2.1–9.2)
NEUTROPHILS NFR BLD AUTO: 54 %
NRBC BLD AUTO-RTO: 0 % (ref 0–0.2)
PLATELET # BLD AUTO: 304 X10(3)/MCL (ref 130–400)
PMV BLD AUTO: 10.7 FL (ref 7.4–10.4)
POTASSIUM SERPL-SCNC: 3.5 MMOL/L (ref 3.5–5.1)
PROT SERPL-MCNC: 7.8 GM/DL (ref 5.8–7.6)
RBC # BLD AUTO: 4.83 X10(6)/MCL (ref 4–5.2)
SODIUM SERPL-SCNC: 137 MMOL/L (ref 136–145)
T4 FREE SERPL-MCNC: 0.93 NG/DL (ref 0.7–1.48)
TRIGL SERPL-MCNC: 130 MG/DL (ref 37–140)
TSH SERPL-ACNC: 0.82 UIU/ML (ref 0.35–4.94)
VLDLC SERPL CALC-MCNC: 26 MG/DL
WBC # SPEC AUTO: 10 X10(3)/MCL (ref 4.5–11)

## 2021-11-04 ENCOUNTER — HISTORICAL (OUTPATIENT)
Dept: ADMINISTRATIVE | Facility: HOSPITAL | Age: 61
End: 2021-11-04

## 2021-12-27 LAB
LEFT EYE DM RETINOPATHY: NEGATIVE
RIGHT EYE DM RETINOPATHY: NEGATIVE

## 2021-12-30 ENCOUNTER — HISTORICAL (OUTPATIENT)
Dept: NEPHROLOGY | Facility: CLINIC | Age: 61
End: 2021-12-30

## 2021-12-30 LAB
ALBUMIN SERPL-MCNC: 3.9 GM/DL (ref 3.4–4.8)
ALBUMIN/GLOB SERPL: 1.1 RATIO (ref 1.1–2)
ALP SERPL-CCNC: 63 UNIT/L (ref 40–150)
ALT SERPL-CCNC: 12 UNIT/L (ref 0–55)
APPEARANCE, UA: CLEAR
AST SERPL-CCNC: 19 UNIT/L (ref 5–34)
BACTERIA SPEC CULT: ABNORMAL
BILIRUB SERPL-MCNC: 0.5 MG/DL
BILIRUB UR QL STRIP: NEGATIVE
BILIRUBIN DIRECT+TOT PNL SERPL-MCNC: 0.2 MG/DL (ref 0–0.5)
BILIRUBIN DIRECT+TOT PNL SERPL-MCNC: 0.3 MG/DL (ref 0–0.8)
BUN SERPL-MCNC: 14.5 MG/DL (ref 9.8–20.1)
CALCIUM SERPL-MCNC: 9.5 MG/DL (ref 8.7–10.5)
CHLORIDE SERPL-SCNC: 110 MMOL/L (ref 98–107)
CO2 SERPL-SCNC: 26 MMOL/L (ref 23–31)
COLOR UR: ABNORMAL
CREAT SERPL-MCNC: 1.1 MG/DL (ref 0.55–1.02)
CREAT UR-MCNC: 128.5 MG/DL (ref 45–106)
GLOBULIN SER-MCNC: 3.6 GM/DL (ref 2.4–3.5)
GLUCOSE (UA): ABNORMAL /UL
GLUCOSE SERPL-MCNC: 72 MG/DL (ref 82–115)
HGB UR QL STRIP: NEGATIVE /HPF
HYALINE CASTS #/AREA URNS LPF: ABNORMAL /LPF
KETONES UR QL STRIP: NEGATIVE /UL
LEUKOCYTE ESTERASE UR QL STRIP: NEGATIVE
MUCOUS THREADS URNS QL MICRO: ABNORMAL /LPF
NITRITE UR QL STRIP: NEGATIVE
PH UR STRIP: 5 /UL (ref 4.5–8)
POTASSIUM SERPL-SCNC: 3.7 MMOL/L (ref 3.5–5.1)
PROT SERPL-MCNC: 7.5 GM/DL (ref 5.8–7.6)
PROT UR QL STRIP: NEGATIVE /UL
PROT UR STRIP-MCNC: 7.8 MG/DL
PROT/CREAT UR-RTO: 60.7 MG/GM CR
RBC #/AREA URNS HPF: ABNORMAL /HPF
SODIUM SERPL-SCNC: 142 MMOL/L (ref 136–145)
SP GR UR STRIP: 1.03 (ref 1–1.03)
SQUAMOUS EPITHELIAL, UA: ABNORMAL /LPF
UROBILINOGEN UR STRIP-ACNC: NORMAL /HPF
WBC #/AREA URNS HPF: ABNORMAL /HPF

## 2022-01-01 LAB — FINAL CULTURE: NORMAL

## 2022-02-11 ENCOUNTER — HISTORICAL (OUTPATIENT)
Dept: INTERNAL MEDICINE | Facility: CLINIC | Age: 62
End: 2022-02-11

## 2022-02-11 LAB
BUN SERPL-MCNC: 17.2 MG/DL (ref 9.8–20.1)
CALCIUM SERPL-MCNC: 9.2 MG/DL (ref 8.7–10.5)
CHLORIDE SERPL-SCNC: 107 MMOL/L (ref 98–107)
CO2 SERPL-SCNC: 25 MMOL/L (ref 23–31)
CREAT SERPL-MCNC: 1.27 MG/DL (ref 0.55–1.02)
CREAT UR-MCNC: 107.9 MG/DL (ref 45–106)
CREAT/UREA NIT SERPL: 14
EST. AVERAGE GLUCOSE BLD GHB EST-MCNC: 168.6 MG/DL
GLUCOSE SERPL-MCNC: 135 MG/DL (ref 82–115)
HBA1C MFR BLD: 7.5 %
HEMOLYSIS INTERF INDEX SERPL-ACNC: 3
ICTERIC INTERF INDEX SERPL-ACNC: 0
LIPEMIC INTERF INDEX SERPL-ACNC: 6
MICROALBUMIN UR-MCNC: <5
MICROALBUMIN/CREAT RATIO PNL UR: <4.6 (ref 0–30)
POTASSIUM SERPL-SCNC: 4.2 MMOL/L (ref 3.5–5.1)
SODIUM SERPL-SCNC: 138 MMOL/L (ref 136–145)

## 2022-05-04 DIAGNOSIS — E11.9 TYPE 2 DIABETES MELLITUS WITHOUT COMPLICATION, UNSPECIFIED WHETHER LONG TERM INSULIN USE: Primary | ICD-10-CM

## 2022-06-16 LAB
CHOLEST SERPL-MSCNC: 159 MG/DL (ref 0–200)
HDLC SERPL-MCNC: 55 MG/DL (ref 35–70)
LDLC SERPL CALC-MCNC: 76 MG/DL (ref 0–160)
TRIGL SERPL-MCNC: 169 MG/DL (ref 40–160)

## 2022-06-21 ENCOUNTER — TELEPHONE (OUTPATIENT)
Dept: INTERNAL MEDICINE | Facility: CLINIC | Age: 62
End: 2022-06-21

## 2022-06-21 NOTE — TELEPHONE ENCOUNTER
Pt needs to reschedule her teladoc appt with Kiana LUEVANO appt due to her phone falling in the pool

## 2022-06-29 ENCOUNTER — OFFICE VISIT (OUTPATIENT)
Dept: INTERNAL MEDICINE | Facility: CLINIC | Age: 62
End: 2022-06-29
Payer: MEDICAID

## 2022-06-29 DIAGNOSIS — I10 PRIMARY HYPERTENSION: Chronic | ICD-10-CM

## 2022-06-29 DIAGNOSIS — Z79.4 TYPE 2 DIABETES MELLITUS WITHOUT COMPLICATION, WITH LONG-TERM CURRENT USE OF INSULIN: Chronic | ICD-10-CM

## 2022-06-29 DIAGNOSIS — E78.49 OTHER HYPERLIPIDEMIA: Primary | Chronic | ICD-10-CM

## 2022-06-29 DIAGNOSIS — E11.9 TYPE 2 DIABETES MELLITUS WITHOUT COMPLICATION, WITH LONG-TERM CURRENT USE OF INSULIN: Chronic | ICD-10-CM

## 2022-06-29 PROBLEM — E78.5 HYPERLIPIDEMIA: Chronic | Status: ACTIVE | Noted: 2022-06-29

## 2022-06-29 PROBLEM — E66.9 OBESITY: Status: ACTIVE | Noted: 2022-06-29

## 2022-06-29 PROBLEM — E78.5 HYPERLIPIDEMIA: Status: ACTIVE | Noted: 2022-06-29

## 2022-06-29 PROCEDURE — 4010F PR ACE/ARB THEARPY RXD/TAKEN: ICD-10-PCS | Mod: CPTII,95,, | Performed by: NURSE PRACTITIONER

## 2022-06-29 PROCEDURE — 3066F NEPHROPATHY DOC TX: CPT | Mod: CPTII,95,, | Performed by: NURSE PRACTITIONER

## 2022-06-29 PROCEDURE — 1160F PR REVIEW ALL MEDS BY PRESCRIBER/CLIN PHARMACIST DOCUMENTED: ICD-10-PCS | Mod: CPTII,95,, | Performed by: NURSE PRACTITIONER

## 2022-06-29 PROCEDURE — 1160F RVW MEDS BY RX/DR IN RCRD: CPT | Mod: CPTII,95,, | Performed by: NURSE PRACTITIONER

## 2022-06-29 PROCEDURE — 1159F MED LIST DOCD IN RCRD: CPT | Mod: CPTII,95,, | Performed by: NURSE PRACTITIONER

## 2022-06-29 PROCEDURE — 3051F HG A1C>EQUAL 7.0%<8.0%: CPT | Mod: CPTII,95,, | Performed by: NURSE PRACTITIONER

## 2022-06-29 PROCEDURE — 99214 OFFICE O/P EST MOD 30 MIN: CPT | Mod: FQ,95,, | Performed by: NURSE PRACTITIONER

## 2022-06-29 PROCEDURE — 3051F PR MOST RECENT HEMOGLOBIN A1C LEVEL 7.0 - < 8.0%: ICD-10-PCS | Mod: CPTII,95,, | Performed by: NURSE PRACTITIONER

## 2022-06-29 PROCEDURE — 1159F PR MEDICATION LIST DOCUMENTED IN MEDICAL RECORD: ICD-10-PCS | Mod: CPTII,95,, | Performed by: NURSE PRACTITIONER

## 2022-06-29 PROCEDURE — 4010F ACE/ARB THERAPY RXD/TAKEN: CPT | Mod: CPTII,95,, | Performed by: NURSE PRACTITIONER

## 2022-06-29 PROCEDURE — 3061F NEG MICROALBUMINURIA REV: CPT | Mod: CPTII,95,, | Performed by: NURSE PRACTITIONER

## 2022-06-29 PROCEDURE — 3061F PR NEG MICROALBUMINURIA RESULT DOCUMENTED/REVIEW: ICD-10-PCS | Mod: CPTII,95,, | Performed by: NURSE PRACTITIONER

## 2022-06-29 PROCEDURE — 3066F PR DOCUMENTATION OF TREATMENT FOR NEPHROPATHY: ICD-10-PCS | Mod: CPTII,95,, | Performed by: NURSE PRACTITIONER

## 2022-06-29 PROCEDURE — 99214 PR OFFICE/OUTPT VISIT, EST, LEVL IV, 30-39 MIN: ICD-10-PCS | Mod: FQ,95,, | Performed by: NURSE PRACTITIONER

## 2022-06-29 RX ORDER — SCOLOPAMINE TRANSDERMAL SYSTEM 1 MG/1
1 PATCH, EXTENDED RELEASE TRANSDERMAL
Qty: 3 PATCH | Refills: 0 | Status: SHIPPED | OUTPATIENT
Start: 2022-06-29 | End: 2022-09-07

## 2022-06-29 RX ORDER — CETIRIZINE HYDROCHLORIDE 10 MG/1
10 TABLET ORAL DAILY
Qty: 90 TABLET | Refills: 2 | Status: SHIPPED | OUTPATIENT
Start: 2022-06-29 | End: 2023-06-12

## 2022-06-29 RX ORDER — LISINOPRIL 5 MG/1
5 TABLET ORAL DAILY
Qty: 90 TABLET | Refills: 2 | Status: SHIPPED | OUTPATIENT
Start: 2022-06-29 | End: 2023-05-09 | Stop reason: SDUPTHER

## 2022-06-29 RX ORDER — METOPROLOL SUCCINATE 25 MG/1
25 TABLET, EXTENDED RELEASE ORAL DAILY
Qty: 90 TABLET | Refills: 2 | Status: SHIPPED | OUTPATIENT
Start: 2022-06-29 | End: 2023-06-12 | Stop reason: SDUPTHER

## 2022-06-29 RX ORDER — ATORVASTATIN CALCIUM 10 MG/1
10 TABLET, FILM COATED ORAL DAILY
Qty: 90 TABLET | Refills: 2 | Status: SHIPPED | OUTPATIENT
Start: 2022-06-29 | End: 2022-12-30 | Stop reason: SDUPTHER

## 2022-06-29 NOTE — ASSESSMENT & PLAN NOTE
Continue Atorvastatin 10 mg daily  Weight loss encouraged  Low fat/high fiber diet  Increase physical activity  Chol: 159  HDL: 55  Tri  LDL: 75  Repeat labs ordered for 2022

## 2022-06-29 NOTE — PROGRESS NOTES
Subjective:       Patient ID: Pedro Vergara is a 61 y.o. female.    Chief Complaint: Follow-up and Labs Only (F/U VISIT WITH LABS)    Established Patient - Audio Only Telehealth Visit     The patient location is: home  The chief complaint leading to consultation is: follow up  Visit type: Virtual visit with audio only (telephone)  Total time spent with patient: 18 minutes     The reason for the audio only service rather than synchronous audio and video virtual visit was related to technical difficulties or patient preference/necessity.     Each patient to whom I provide medical services by telemedicine is:  (1) informed of the relationship between the physician and patient and the respective role of any other health care provider with respect to management of the patient; and (2) notified that they may decline to receive medical services by telemedicine and may withdraw from such care at any time. Patient verbally consented to receive this service via voice-only telephone call.     This service was not originating from a related E/M service provided within the previous 7 days nor will  to an E/M service or procedure within the next 24 hours or my soonest available appointment.  Prevailing standard of care was able to be met in this audio-only visit.          Patient has diagnosis of HLD, HTN, DM2, CKD. Patient seen per audio visit today for follow up. Patient last seen in clinic on 03/24/2022. Repeat labs ordered due to elevated cholesterol levels. Repeat labs done at [facility]. Repeat lipid panel showed improvement in cholesterol levels.     Patient states followed Dr. Reymundo Avery, Podiatrist in [place].     Patient states followed by Medical Clinic in [place] [facility].    Patient followed by Endocrinology Clinic for DM2. Last appointment on 02/11/2022. Patient has follow up appointment on 08/12/2022.    Patient followed by Nephrology Clinic for CKD. Last appointment on  01/05/2022. Patient has follow up appointment scheduled for 09/07/2022.    Patient seen by ENT clinic on 11/18/2021 for allergic rhinitis. Patient instructed to use AYR gel or Vaseline to right nares tid, Zyrtec daily. Patient to follow up as needed.    Patient seen by Cardiology Clinic for abnormal stress test. Last appointment on 07/09/2021. She completed an echocardiogram on 6.3.21 that revealed an intact ejection fraction approximately 55% (see full report below). The patient had a Lexiscan stress test on 4.5.21 that revealed possible apical ischemia.  She had a subsequent LHC on 6.7.21 that revealed normal coronaries arteries. Continue ASA, Lipitor, lisinopril, and SL nitro prn. Follow Patient was to follow up in Cardiology clinic in 5 months, no follow up noted. Follow up with PCP as directed      Mammogram: 06/08/2020, ordered  PAP: Hysterectomy  FIT: 05/05/2022, negative  Colonoscopy: 08/22/2014  Diabetic Eye Exam: 08/03/2020, Eye Exam scheduled 04/2022, awaiting medical record  Diabetic Foot Exam: 03/24/2021, Podiatrist appointment on 03/28/2022, awaiting medical record    Review of Systems   Constitutional: Negative.    HENT: Negative.    Eyes: Negative.    Respiratory: Negative.    Cardiovascular: Negative.    Gastrointestinal: Negative.    Endocrine: Negative.    Genitourinary: Negative.    Musculoskeletal: Negative.    Integumentary:  Negative.   Allergic/Immunologic: Negative.    Neurological: Negative.    Hematological: Negative.    Psychiatric/Behavioral: Negative.          Objective:      Physical Exam  Neurological:      Mental Status: She is alert and oriented to person, place, and time.   Psychiatric:         Mood and Affect: Mood normal.         Assessment:       Problem List Items Addressed This Visit        Cardiac/Vascular    Hyperlipidemia - Primary (Chronic)     Continue Atorvastatin 10 mg daily  Weight loss encouraged  Low fat/high fiber diet  Increase physical activity  Chol: 159  HDL:  55  Tri  LDL: 75  Repeat labs ordered for 2022           Relevant Orders    Lipid Panel    Hypertension (Chronic)     B/P: deferred due to audio visit  UA Creatinine: ordered  UA Microalbumin: ordered  Continue Lisinopril 5 mg daily, Metoprolol Succ 25 mg daily  DASH diet  Encouraged home blood pressure monitoring           Relevant Orders    CBC Auto Differential    Comprehensive Metabolic Panel    Urinalysis, Reflex to Urine Culture Urine, Clean Catch    TSH    Microalbumin/Creatinine Ratio, Urine       Endocrine    Diabetes mellitus (Chronic)     Followed by Endocrinology           Relevant Orders    CBC Auto Differential    Comprehensive Metabolic Panel    Urinalysis, Reflex to Urine Culture Urine, Clean Catch    Microalbumin/Creatinine Ratio, Urine    Hemoglobin A1C          Plan:    Patient to follow up in 6 months with labs to be done prior to visit.

## 2022-06-29 NOTE — ASSESSMENT & PLAN NOTE
B/P: deferred due to audio visit  UA Creatinine: ordered  UA Microalbumin: ordered  Continue Lisinopril 5 mg daily, Metoprolol Succ 25 mg daily  DASH diet  Encouraged home blood pressure monitoring

## 2022-07-01 DIAGNOSIS — E11.65 TYPE 2 DIABETES MELLITUS WITH HYPERGLYCEMIA: ICD-10-CM

## 2022-07-01 RX ORDER — DULAGLUTIDE 1.5 MG/.5ML
INJECTION, SOLUTION SUBCUTANEOUS
Qty: 4 PEN | Refills: 4 | Status: SHIPPED | OUTPATIENT
Start: 2022-07-01 | End: 2022-12-20 | Stop reason: SDUPTHER

## 2022-07-05 DIAGNOSIS — E11.65 TYPE 2 DIABETES MELLITUS WITH HYPERGLYCEMIA: ICD-10-CM

## 2022-07-05 RX ORDER — EMPAGLIFLOZIN 25 MG/1
TABLET, FILM COATED ORAL
Qty: 90 TABLET | Refills: 1 | Status: SHIPPED | OUTPATIENT
Start: 2022-07-05 | End: 2022-12-15

## 2022-07-08 ENCOUNTER — PATIENT OUTREACH (OUTPATIENT)
Dept: ADMINISTRATIVE | Facility: HOSPITAL | Age: 62
End: 2022-07-08
Payer: MEDICAID

## 2022-07-08 NOTE — PROGRESS NOTES
Population Health. Out Reach.  The following record(s)  below were uploaded for Health Maintenance .    6/16/22 LIPID PANEL

## 2022-08-04 ENCOUNTER — OFFICE VISIT (OUTPATIENT)
Dept: ENDOCRINOLOGY | Facility: CLINIC | Age: 62
End: 2022-08-04
Payer: MEDICAID

## 2022-08-04 ENCOUNTER — CLINICAL SUPPORT (OUTPATIENT)
Dept: DIABETES | Facility: CLINIC | Age: 62
End: 2022-08-04
Payer: MEDICAID

## 2022-08-04 VITALS
HEART RATE: 62 BPM | DIASTOLIC BLOOD PRESSURE: 82 MMHG | BODY MASS INDEX: 34.62 KG/M2 | SYSTOLIC BLOOD PRESSURE: 129 MMHG | TEMPERATURE: 98 F | HEIGHT: 62 IN | RESPIRATION RATE: 20 BRPM | WEIGHT: 188.13 LBS

## 2022-08-04 VITALS — BODY MASS INDEX: 34.54 KG/M2 | WEIGHT: 187.69 LBS | HEIGHT: 62 IN

## 2022-08-04 DIAGNOSIS — E11.9 TYPE 2 DIABETES MELLITUS WITHOUT COMPLICATION, UNSPECIFIED WHETHER LONG TERM INSULIN USE: ICD-10-CM

## 2022-08-04 DIAGNOSIS — N28.9 NEPHROPATHY: ICD-10-CM

## 2022-08-04 DIAGNOSIS — G62.9 NEUROPATHY: ICD-10-CM

## 2022-08-04 DIAGNOSIS — E16.2 HYPOGLYCEMIA: ICD-10-CM

## 2022-08-04 DIAGNOSIS — E11.65 UNCONTROLLED TYPE 2 DIABETES MELLITUS WITH HYPERGLYCEMIA: Primary | ICD-10-CM

## 2022-08-04 LAB
CREAT UR-MCNC: 82.9 MG/DL (ref 47–110)
HBA1C MFR BLD: 7.5 %
MICROALBUMIN UR-MCNC: <5 UG/ML
MICROALBUMIN/CREAT RATIO PNL UR: <6 MG/GM CR (ref 0–30)

## 2022-08-04 PROCEDURE — 3066F NEPHROPATHY DOC TX: CPT | Mod: CPTII,,, | Performed by: NURSE PRACTITIONER

## 2022-08-04 PROCEDURE — 1160F RVW MEDS BY RX/DR IN RCRD: CPT | Mod: CPTII,,, | Performed by: NURSE PRACTITIONER

## 2022-08-04 PROCEDURE — 3051F PR MOST RECENT HEMOGLOBIN A1C LEVEL 7.0 - < 8.0%: ICD-10-PCS | Mod: CPTII,,, | Performed by: NURSE PRACTITIONER

## 2022-08-04 PROCEDURE — 3079F PR MOST RECENT DIASTOLIC BLOOD PRESSURE 80-89 MM HG: ICD-10-PCS | Mod: CPTII,,, | Performed by: NURSE PRACTITIONER

## 2022-08-04 PROCEDURE — 83036 HEMOGLOBIN GLYCOSYLATED A1C: CPT | Mod: PBBFAC | Performed by: NURSE PRACTITIONER

## 2022-08-04 PROCEDURE — 3061F PR NEG MICROALBUMINURIA RESULT DOCUMENTED/REVIEW: ICD-10-PCS | Mod: CPTII,,, | Performed by: NURSE PRACTITIONER

## 2022-08-04 PROCEDURE — 99212 OFFICE O/P EST SF 10 MIN: CPT | Mod: PBBFAC,27

## 2022-08-04 PROCEDURE — 3051F HG A1C>EQUAL 7.0%<8.0%: CPT | Mod: CPTII,,, | Performed by: NURSE PRACTITIONER

## 2022-08-04 PROCEDURE — G0108 DIAB MANAGE TRN  PER INDIV: HCPCS | Mod: PBBFAC

## 2022-08-04 PROCEDURE — 1160F PR REVIEW ALL MEDS BY PRESCRIBER/CLIN PHARMACIST DOCUMENTED: ICD-10-PCS | Mod: CPTII,,, | Performed by: NURSE PRACTITIONER

## 2022-08-04 PROCEDURE — 3008F BODY MASS INDEX DOCD: CPT | Mod: CPTII,,, | Performed by: NURSE PRACTITIONER

## 2022-08-04 PROCEDURE — 1159F MED LIST DOCD IN RCRD: CPT | Mod: CPTII,,, | Performed by: NURSE PRACTITIONER

## 2022-08-04 PROCEDURE — 4010F ACE/ARB THERAPY RXD/TAKEN: CPT | Mod: CPTII,,, | Performed by: NURSE PRACTITIONER

## 2022-08-04 PROCEDURE — 99215 OFFICE O/P EST HI 40 MIN: CPT | Mod: PBBFAC | Performed by: NURSE PRACTITIONER

## 2022-08-04 PROCEDURE — 3008F PR BODY MASS INDEX (BMI) DOCUMENTED: ICD-10-PCS | Mod: CPTII,,, | Performed by: NURSE PRACTITIONER

## 2022-08-04 PROCEDURE — 3079F DIAST BP 80-89 MM HG: CPT | Mod: CPTII,,, | Performed by: NURSE PRACTITIONER

## 2022-08-04 PROCEDURE — 3061F NEG MICROALBUMINURIA REV: CPT | Mod: CPTII,,, | Performed by: NURSE PRACTITIONER

## 2022-08-04 PROCEDURE — 99214 PR OFFICE/OUTPT VISIT, EST, LEVL IV, 30-39 MIN: ICD-10-PCS | Mod: S$PBB,,, | Performed by: NURSE PRACTITIONER

## 2022-08-04 PROCEDURE — 99214 OFFICE O/P EST MOD 30 MIN: CPT | Mod: S$PBB,,, | Performed by: NURSE PRACTITIONER

## 2022-08-04 PROCEDURE — 1159F PR MEDICATION LIST DOCUMENTED IN MEDICAL RECORD: ICD-10-PCS | Mod: CPTII,,, | Performed by: NURSE PRACTITIONER

## 2022-08-04 PROCEDURE — 3074F PR MOST RECENT SYSTOLIC BLOOD PRESSURE < 130 MM HG: ICD-10-PCS | Mod: CPTII,,, | Performed by: NURSE PRACTITIONER

## 2022-08-04 PROCEDURE — 4010F PR ACE/ARB THEARPY RXD/TAKEN: ICD-10-PCS | Mod: CPTII,,, | Performed by: NURSE PRACTITIONER

## 2022-08-04 PROCEDURE — 3066F PR DOCUMENTATION OF TREATMENT FOR NEPHROPATHY: ICD-10-PCS | Mod: CPTII,,, | Performed by: NURSE PRACTITIONER

## 2022-08-04 PROCEDURE — 82043 UR ALBUMIN QUANTITATIVE: CPT | Performed by: NURSE PRACTITIONER

## 2022-08-04 PROCEDURE — 3074F SYST BP LT 130 MM HG: CPT | Mod: CPTII,,, | Performed by: NURSE PRACTITIONER

## 2022-08-04 NOTE — PROGRESS NOTES
Subjective:       Patient ID: Pedro Vergara is a 61 y.o. female.    Chief Complaint: No chief complaint on file.    Previous endocrine clinic notes     03/05/2021 Endocrine Clinic Note: Ms. Pedro Vergara is a 60 yo F with PMHx of T2DM, vitamin D deficiency, CKD stage III, and hypertension. She presents today for follow-up regarding diabetes management. She is currently taking Victoza 1.8, glimepiride 4, Lantus 25U, and Jardiance 10 in the am. She states that she checks blood glucose at once in the morning and once at night and endorses that she often has hypoglycemia in the mornings reaching blood glucoses in the 60s. Other times she states that her blood glucose is higher reaching around the 140s-150s. She is compliant with her diabetes medication regimen and expresses that her diet is much improved from her previous visit, eating more vegetables and healthy meats. She admits that she does not always eat breakfast, which might be contributing to her hypoglycemic episodes. Today, she says she is feeling fatigued, which she attributes to her recent COVID vaccine and hypoglycemia. (1) -Last A1C on 3/3/21 7.5% (down 8.8% in 12/2020), trending in proper direction, A1C goal <7%  -Continue Lantus 25U, Victoza 1.8 mg  -Decrease glimepiride to 2mg at breakfast with none at night given hypoglycemic episodes; if pt does not eat breakfast, instructed to take glimepiride with largest meal  -Increase Jardiance to 25 mg in am given still having hyperglycemic episodes  -Maintain fasting glucose between  and post-prandial glucose <180  -Counseled patient on importance of bringing glucose log to next visit, patient agreeable  -Encouraged patient to continue with healthy diet and to ensure she eats to prevent hypoglycemic episodes (2)    06/10/2021 Endocrine Clinic Note: 60-year-old female scheduled today as endocrine clinic follow-up.  History of uncontrolled type 2 diabetes, CKD stage III, hypertension,  hyperlipidemia, vitamin D deficiency.  Patient was previously referred to endocrine clinic is here today for follow-up visit.  Current A1c 7.9 increased from Previous A1c 7.5,  previous 8.8 and 11.0.  Patient has only checks CBG's about once a week range of 110-157.  Patient denies any symptoms of hypoglycemia.  On patient's previous visit glimepiride was changed from twice a day to once in the morning due to a.m. hypoglycemia which has resolved.  Patient was started on Victoza and was to titrate up as stated currently she is on 1.2 mg.  Hypertension at goal today.  CKD/nephropathy Patient is seen by renal clinic previous renal functions GFR 55, creatinine 1.26, BUN 16 improved from previous renal functions on 8/11/2020 GFR 42, creatinine 42, BUN 1.6.  Neuropathy patient was put on gabapentin and sees a podiatrist but was recently referred to neurologist due to sciatica. (1)    10/11/2021 Endocrine Clinic Note: 60-year-old female scheduled today for endocrine clinic follow-up.  History of uncontrolled type 2 diabetes, CKD stage III, hypertension, hyperlipidemia and vitamin D deficiency.  Uncontrolled type 2 diabetes current A1c 7.2 improved from previous 7.9, 7.5, 8.8 and 11.0. Patient checks CBGs daily fasting ranges .  Patient has frequent hypoglycemia in the a.m.  When discussing with patient patient needs at 6 PM and at times does not eat until the next day at noon.  Discussed with patient today eating breakfast in the morning.  Also DM education will discussed with patient today eating 3 meals per day. Neuropathy patient is currently on gabapentin.  Nephropathy urine micro elevated 10/27/2020 on lisinopril 5 mg repeat urine micro today.  Hypertension at goal. CKD patient is followed by renal clinic.  Patient has fever blisters breaking out around her mouth previously renal clinic is giving her Valtrex 1 g x 2 doses longer dosing not recommended due to renal functions.  Nephropathy urine micro mild  elevation 12/30/2021 patient is currently on low-dose ACE.  Neuropathy discussed with patient controlling diabetes.    Current endocrine clinic note 08/04/2022  61-year-old female scheduled today for endocrine clinic follow-up.  History of uncontrolled type 2 diabetes with hypoglycemia, CKD stage 3, hypertension, hyperlipidemia vitamin-D deficiency.  Uncontrolled type 2 diabetes current A1c 7.5 previous 7.2 and 7.9.  Patient checks CBG is 1-2 times per day CBG's range fasting  in the am.  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  Will need patient's CGM alert patient hypoglycemia.  Patient reports current A1c slightly increased due to her being on a cruise recently and states she being large meals and lots of meals overnight on ADA diet.  She returned from the cruise a few days ago.    Review of Systems   Constitutional: Negative for activity change, appetite change and fatigue.   HENT: Negative for dental problem, hearing loss, tinnitus, trouble swallowing and goiter.    Eyes: Negative for photophobia, pain and visual disturbance.   Respiratory: Negative for cough, chest tightness and wheezing.    Cardiovascular: Negative for chest pain, palpitations and leg swelling.   Gastrointestinal: Negative for abdominal pain, constipation, diarrhea, nausea and reflux.   Endocrine: Negative for cold intolerance, heat intolerance, polydipsia and polyphagia.   Genitourinary: Negative for difficulty urinating, flank pain, hematuria, hot flashes, menstrual irregularity, menstrual problem, nocturia and urgency.   Musculoskeletal: Negative for back pain, gait problem, joint swelling, leg pain and joint deformity.   Integumentary:  Negative for color change, pallor, rash and breast discharge.    Allergic/Immunologic: Negative for environmental allergies, food allergies and immunocompromised state.   Neurological: Negative for tremors, seizures, headaches, disturbances in coordination, memory loss and coordination difficulties.   Psychiatric/Behavioral: Negative for agitation, behavioral problems and sleep disturbance. The patient is not nervous/anxious.          Objective:      Physical Exam  Constitutional:       General: She is not in acute distress.     Appearance: Normal appearance. She is not ill-appearing.   HENT:      Head: Normocephalic and atraumatic.      Right Ear: External ear normal.      Left Ear: External ear normal.      Nose: Nose normal. No congestion or rhinorrhea.      Mouth/Throat:      Mouth: Mucous membranes are moist.      Pharynx: Oropharynx is clear. No oropharyngeal exudate.   Eyes:      General:         Right eye: No discharge.         Left eye: No discharge.      Conjunctiva/sclera: Conjunctivae normal.      Pupils: Pupils are equal, round, and reactive to light.   Neck:      Thyroid: No thyroid mass, thyromegaly or thyroid tenderness.   Cardiovascular:      Rate and Rhythm: Normal rate and regular rhythm.      Pulses: Normal pulses.           Dorsalis pedis pulses are 2+ on the right side and 2+ on the left side.        Posterior tibial pulses are 2+ on the right side and 2+ on the left side.      Heart sounds: Normal heart sounds. No murmur heard.  Pulmonary:      Effort: Pulmonary effort is normal. No respiratory distress.      Breath sounds: Normal breath sounds.   Abdominal:      General: Abdomen is flat. Bowel sounds are normal. There is no distension.      Palpations: Abdomen is soft.      Tenderness: There is no abdominal tenderness.   Musculoskeletal:         General: No swelling or tenderness. Normal range of motion.      Cervical back: Normal range of motion and neck supple. No tenderness.      Right lower leg: No edema.      Left lower leg: No edema.   Feet:       Right foot:      Protective Sensation: 5 sites tested. 5 sites sensed.      Skin integrity: Skin integrity normal.      Toenail Condition: Right toenails are normal.      Left foot:      Protective Sensation: 5 sites tested. 5 sites sensed.      Skin integrity: Skin integrity normal.      Toenail Condition: Left toenails are normal.      Comments: Normal foot exam   Lymphadenopathy:      Cervical: No cervical adenopathy.   Skin:     General: Skin is warm and dry.      Coloration: Skin is not jaundiced or pale.   Neurological:      General: No focal deficit present.      Mental Status: She is alert and oriented to person, place, and time. Mental status is at baseline.      Coordination: Coordination normal.      Gait: Gait normal.   Psychiatric:         Mood and Affect: Mood normal.         Behavior: Behavior normal.         Thought Content: Thought content normal.         Assessment:       Problem List Items Addressed This Visit    None     Visit Diagnoses     Uncontrolled type 2 diabetes mellitus with hyperglycemia    -  Primary    Relevant Orders    Hemoglobin A1C, POCT    Microalbumin/Creatinine Ratio, Urine    Hypoglycemia        Neuropathy        Nephropathy              Plan:       Uncontrolled type 2 diabetes mellitus with hyperglycemia  Current A1C 7.5 previous 7.6, 7.2, 7.9, 7.5, 8.8, 11.0  Urine Micro Creatine/ratio: 12/30/2021 mild elevation  Medications: Glimepiride 2 mg Qday, Jardiance 25mg Qday Lantus 22 units, Trulicity 1.5mg Changes: Lantus 18 units  Start Duncan   Eye Exam: Eye clinic in [place] 6 months ago request   Home Glucometer Use: Occasional  Last Hypoglycemic episode: Every 4 days in the morning  Follow ADA diet, avoid soda, simple sweets, and limit rice, breads and starches  Maintain healthy weight, exercise 4-5 times a week for 30 minutes  Diet and medication compliance discussed on visit  RTC 4 months w/ BMP, A1C, urine micro   -     Hemoglobin A1C, POCT  -     Microalbumin/Creatinine  Ratio, Urine    Hypoglycemia  Decrease Lantus to 18 units  Start CGM Duncan for hypoglycemia unawareness  Frequent in am with late sleeping and unawareness lowest 54 frequent 60's    Call clinic further episodes of hypoglycemia     Neuropathy  Foot Care Education given today  Foot Exam today     Nephropathy  Urine Micro Creatine/ratio: 12/30/2021 mild elevation  Repeat urine micro today  On a low-dose ACE                   no difficulties

## 2022-08-04 NOTE — PROGRESS NOTES
Diabetes Care Specialist Progress Note  Author: Loyda Smiley RD  Date: 8/4/2022    Program Intake  Reason for Diabetes Program Visit:: Intervention  Type of Intervention:: Individual  Individual: Education  Education: Self-Management Skill Review  Current diabetes risk level:: moderate    Lab Results   Component Value Date    HGBA1C 7.5 08/04/2022       Clinical    Patient Health Rating  Compared to other people your age, how would you rate your health?: Good         Clinical Assessment  Current Diabetes Treatment: Oral Medication, Insulin, Injectable (Glimepiride 2mg qAM, Jardiance 25mg qAM, Lantus 18 units qHS (decreased from 22 units at endo appt today, takes @ 10PM), Trulicity 1.5mg q Wednesday)    Medication Information  How many days a week do you miss your medications?: Never  Medication adherence impacting ability to self-manage diabetes?: No         Nutritional Status  Diet: Regular  Meal Plan 24 Hour Recall: Breakfast, Lunch, Snack  Meal Plan 24 Hour Recall - Breakfast: (7-8AM) yogurt & fruit or a little more than 1 cup of oatmeal with water  Meal Plan 24 Hour Recall - Lunch: (before 6PM) drumstick with broccoli or spinach, sometimes 1/2 of a sweet potato  Meal Plan 24 Hour Recall - Snack: (sometimes before bed) peanut butter crackers or < 1 cup of watermelon  Change in appetite?: No  Recent Changes in Weight: Weight Loss  Was weight loss intentional or unintentional?: Intentional  Current nutritional status an area of need that is impacting patient's ability to self-manage diabetes?: No    Additional Social History    Support  Does anyone support you with your diabetes care?: yes  Who supports you?: self  Who takes you to your medical appointments?: self  Does the current support meet the patient's needs?: Yes  Is Support an area impacting ability to self-manage diabetes?: No         Cognitive/Behavioral Health  Alert and Oriented: Yes  Difficulty Thinking: No  Requires Prompting: No  Requires assistance  for routine expression?: No  Cognitive or behavioral barriers impacting ability to self-manage diabetes?: No         Communication  Language preference: English  Communication needs impacting ability to self-manage diabetes?: No    Health Literacy  Preferred Learning Method: Face to Face, Reading Materials  Health literacy needs impacting ability to self-manage diabetes?: No      Diabetes Self-Management Skills Assessment    Diabetes Disease Process/Treatment Options  Patient/caregiver able to state what happens when someone has diabetes.: no  Patient/caregiver knows what type of diabetes they have.: yes  Diabetes Type : Type II  Patient/caregiver able to identify at least three signs and symptoms of diabetes.: no  Patient able to identify at least three risk factors for diabetes.: no  Diabetes Disease Process/Treatment Options: Skills Assessment Completed: Yes  Assessment indicates:: Instruction Needed  Area of need?: Yes    Nutrition/Healthy Eating  Challenges to healthy eating:: other (see comments) (sleeping pattern affecting ability to consume regular meals)  Method of carbohydrate measurement:: eyeballing/guessing  Patient can identify foods that impact blood sugar.: yes  Patient-identified foods:: starches (bread, pasta, rice, cereal), starchy vegetables (corn, peas, beans), fruit/fruit juice  Nutrition/Healthy Eating Skills Assessment Completed:: Yes  Assessment indicates:: Adequate understanding  Area of need?: Yes    Physical Activity/Exercise  Patient's daily activity level:: sedentary  Patient formally exercises outside of work.: no  Reasons for not exercising:: physically unable to exercise currently (due to foot problems, was previously walking 4 miles daily for ~ 2 months)  Patient can identify forms of physical activity.: yes  Stated forms of physical activity:: other (see comments) (walking)  Patient can identify reasons why exercise/physical activity is important in diabetes management.:  no  Physical Activity/Exercise Skills Assessment Completed: : Yes  Assessment indicates:: Adequate understanding  Area of need?: No    Medications  Patient is able to describe current diabetes management routine.: yes  Diabetes management routine:: injectable medications, insulin, oral medications  Patient is able to identify current diabetes medications, dosages, and appropriate timing of medications.: yes  Patient understands the purpose of the medications taken for diabetes.: yes  Patient reports problems or concerns with current medication regimen.: no  Medication Skills Assessment Completed:: Yes  Assessment indicates:: Adequate understanding  Area of need?: No    Home Blood Glucose Monitoring  Patient states that blood sugar is checked at home daily.: yes  Monitoring Method:: home glucometer  How often do you check your blood sugar?: Twice a day (pt reports checking 1-2x/d, logs show mostly checking fasting CBG once daily)  When do you check your blood sugar?: Before breakfast, Before lunch  When you check what is your typical blood sugar range? :   Blood glucose logs:: yes  Blood glucose logs reviewed today?: yes (scanned in by [employer], see , logs end 7/24)  Home Blood Glucose Monitoring Skills Assessment Completed: : Yes  Assessment indicates:: Adequate understanding  Area of need?: No    Acute Complications  Patient is able to identify types of acute complications: No  Acute Complications Skills Assessment Completed: : Yes  Assessment indicates:: Adequate understanding  Area of need?: No    Chronic Complications  Patient can identify major chronic complications of diabetes.: yes  Stated chronic complications:: kidney disease, retinopathy  Patient can identify ways to prevent or delay diabetes complications.: yes  Stated ways to prevent complications:: maintaining optimal blood glucose control, controlling blood sugar  Patient is aware that having diabetes increases risk of heart disease?:  "Yes  Patient is aware that heart disease is the leading cause of death and disability in people with diabetes?: Yes  Patient able to state risk factors for heart disease?: No  Chronic Complications Skills Assessment Completed: : Yes  Assessment indicates:: Adequate understanding  Area of need?: No    Psychosocial/Coping  Psychosocial/Coping Skills Assessment Completed: : No  Deffered due to:: Time      Diabetes Self Support Plan         Assessment Summary and Plan    Based on today's diabetes care assessment, the following areas of need were identified:      Social 8/4/2022   Support No   Cognitive/Behavioral Health No   Communication No   Health Literacy No        Clinical 8/4/2022   Medication Adherence No   Nutritional Status No        Diabetes Self-Management Skills 8/4/2022   Diabetes Disease Process/Treatment Options Yes - discussed types of diabetes & pathophysiology   Nutrition/Healthy Eating Yes - Discussed importance of regular eating pattern.  Pt reports that she sleeps from ~ 1am - 2pm.  Pt used to skip breakfast but she reports that she has been waking at 7-8am to take meds & eat a small meal before going back to sleep until ~ 2pm.  Pt then eats a "lunch" meal sometime before 6pm & this is sometimes her last meal of the day unless she eats a snack before bedtime.  Stressed importance of snack containing small amount of carbohydrate if unable to have 3 meals/d to help with stabilizing blood sugars.   Physical Activity/Exercise No   Medication No   Home Blood Glucose Monitoring No   Acute Complications No - reviewed hyperglycemia/hypoglycemia & treatment, stressed importance of checking blood sugars post-hypoglycemia treatment to ensure that blood sugar is within normal range   Chronic Complications No          Today's interventions were provided through individual discussion, instruction, and written materials were provided.      Patient verbalized understanding of instruction and written materials.  Pt " was able to return back demonstration of instructions today. Patient understood key points, needs reinforcement and further instruction.     Diabetes Self-Management Care Plan:    Today's Diabetes Self-Management Care Plan was developed with Pedro's input. Pedro has agreed to work toward the following goal(s) to improve his/her overall diabetes control.      Care Plan: Diabetes Management   Updates made since 7/5/2022 12:00 AM      Problem: Blood Glucose Self-Monitoring       Goal: Patient agrees to check and record blood sugars 1-2 times per day. Completed 8/4/2022   Start Date: 2/11/2022   Expected End Date: 8/4/2022   This Visit's Progress: Met   Priority: High   Barriers: No Barriers Identified   Note:    8/4/22: Pt previously met with diabetes educator on 2/11/22 with goal to check fasting blood sugars daily.  Pt checking fasting blood sugars daily per blood sugar logs.  No questions/concerns at this time.  Goal met.     Care Plan: Diabetes Management   Updates made since 7/5/2022 12:00 AM      Problem: Physical Activity and Exercise       Goal: Patient agrees to walk 4 miles daily once she receives injection for foot pain/foot pain has resolved.    Start Date: 2/11/2022   Expected End Date: 9/5/2022   Priority: Medium   Barriers: Physical Limitations   Note:    8/4/22: Pt attended diabetes education on 2/11/22 & goal was established to be active for at least 30 minutes daily.  Pt reports that she had been walking for 4 miles every day for about a 2 month period but recently stopped due to foot pain.  Pt reports that she will soon receive an injection which will help with the pain & she will be able to resume daily walking.       Task: Discussed role of physical activity on reducing insulin resistance and improvement in overall glycemic control. Completed 8/4/2022          Follow Up Plan     Pt has previously been through diabetes education multiple times & feels that she can manage her diabetes by calling with  questions prn.  Follow up if symptoms worsen or fail to improve.    Today's care plan and follow up schedule was discussed with patient.  Pedro verbalized understanding of the care plan, goals, and agrees to follow up plan.        The patient was encouraged to communicate with his/her health care provider/physician and care team regarding his/her condition(s) and treatment.  I provided the patient with my contact information today and encouraged to contact me via phone or [facility] Patient Portal as needed.     Length of Visit   Total Time: 30 Minutes

## 2022-08-15 DIAGNOSIS — N18.9 CHRONIC KIDNEY DISEASE, UNSPECIFIED CKD STAGE: Primary | ICD-10-CM

## 2022-08-16 DIAGNOSIS — E11.65 TYPE 2 DIABETES MELLITUS WITH HYPERGLYCEMIA: ICD-10-CM

## 2022-08-16 RX ORDER — GLIMEPIRIDE 2 MG/1
TABLET ORAL
Qty: 30 TABLET | Refills: 5 | Status: SHIPPED | OUTPATIENT
Start: 2022-08-16 | End: 2022-12-20 | Stop reason: SDUPTHER

## 2022-09-06 ENCOUNTER — DOCUMENTATION ONLY (OUTPATIENT)
Dept: ADMINISTRATIVE | Facility: HOSPITAL | Age: 62
End: 2022-09-06
Payer: MEDICAID

## 2022-09-06 RX ORDER — INSULIN GLARGINE 100 [IU]/ML
22 INJECTION, SOLUTION SUBCUTANEOUS DAILY
OUTPATIENT
Start: 2022-09-06

## 2022-09-06 NOTE — PROGRESS NOTES
The following record(s)  below were uploaded for Health Maintenance .      DM EYE EXAM    COLONOSCOPY

## 2022-09-07 ENCOUNTER — OFFICE VISIT (OUTPATIENT)
Dept: NEPHROLOGY | Facility: CLINIC | Age: 62
End: 2022-09-07
Payer: MEDICAID

## 2022-09-07 VITALS
WEIGHT: 179.19 LBS | BODY MASS INDEX: 32.97 KG/M2 | OXYGEN SATURATION: 100 % | HEART RATE: 62 BPM | SYSTOLIC BLOOD PRESSURE: 101 MMHG | HEIGHT: 62 IN | RESPIRATION RATE: 18 BRPM | TEMPERATURE: 98 F | DIASTOLIC BLOOD PRESSURE: 68 MMHG

## 2022-09-07 DIAGNOSIS — N18.31 CKD STAGE G3A/A1, GFR 45-59 AND ALBUMIN CREATININE RATIO <30 MG/G: Primary | ICD-10-CM

## 2022-09-07 DIAGNOSIS — I10 PRIMARY HYPERTENSION: Chronic | ICD-10-CM

## 2022-09-07 PROBLEM — R04.0 EPISTAXIS: Status: ACTIVE | Noted: 2022-09-07

## 2022-09-07 PROBLEM — B00.1 HERPES LABIALIS: Status: ACTIVE | Noted: 2022-09-07

## 2022-09-07 PROBLEM — G56.00 CARPAL TUNNEL SYNDROME: Status: ACTIVE | Noted: 2022-09-07

## 2022-09-07 PROBLEM — M65.30 TRIGGERING OF DIGIT: Status: ACTIVE | Noted: 2022-09-07

## 2022-09-07 PROBLEM — J30.9 ALLERGIC RHINITIS: Status: ACTIVE | Noted: 2022-09-07

## 2022-09-07 PROCEDURE — 3078F DIAST BP <80 MM HG: CPT | Mod: CPTII,,, | Performed by: NURSE PRACTITIONER

## 2022-09-07 PROCEDURE — 4010F ACE/ARB THERAPY RXD/TAKEN: CPT | Mod: CPTII,,, | Performed by: NURSE PRACTITIONER

## 2022-09-07 PROCEDURE — 1160F PR REVIEW ALL MEDS BY PRESCRIBER/CLIN PHARMACIST DOCUMENTED: ICD-10-PCS | Mod: CPTII,,, | Performed by: NURSE PRACTITIONER

## 2022-09-07 PROCEDURE — 3066F NEPHROPATHY DOC TX: CPT | Mod: CPTII,,, | Performed by: NURSE PRACTITIONER

## 2022-09-07 PROCEDURE — 3061F PR NEG MICROALBUMINURIA RESULT DOCUMENTED/REVIEW: ICD-10-PCS | Mod: CPTII,,, | Performed by: NURSE PRACTITIONER

## 2022-09-07 PROCEDURE — 3008F PR BODY MASS INDEX (BMI) DOCUMENTED: ICD-10-PCS | Mod: CPTII,,, | Performed by: NURSE PRACTITIONER

## 2022-09-07 PROCEDURE — 3074F SYST BP LT 130 MM HG: CPT | Mod: CPTII,,, | Performed by: NURSE PRACTITIONER

## 2022-09-07 PROCEDURE — 1160F RVW MEDS BY RX/DR IN RCRD: CPT | Mod: CPTII,,, | Performed by: NURSE PRACTITIONER

## 2022-09-07 PROCEDURE — 3051F PR MOST RECENT HEMOGLOBIN A1C LEVEL 7.0 - < 8.0%: ICD-10-PCS | Mod: CPTII,,, | Performed by: NURSE PRACTITIONER

## 2022-09-07 PROCEDURE — 99214 PR OFFICE/OUTPT VISIT, EST, LEVL IV, 30-39 MIN: ICD-10-PCS | Mod: S$PBB,,, | Performed by: NURSE PRACTITIONER

## 2022-09-07 PROCEDURE — 3078F PR MOST RECENT DIASTOLIC BLOOD PRESSURE < 80 MM HG: ICD-10-PCS | Mod: CPTII,,, | Performed by: NURSE PRACTITIONER

## 2022-09-07 PROCEDURE — 3051F HG A1C>EQUAL 7.0%<8.0%: CPT | Mod: CPTII,,, | Performed by: NURSE PRACTITIONER

## 2022-09-07 PROCEDURE — 3066F PR DOCUMENTATION OF TREATMENT FOR NEPHROPATHY: ICD-10-PCS | Mod: CPTII,,, | Performed by: NURSE PRACTITIONER

## 2022-09-07 PROCEDURE — 3061F NEG MICROALBUMINURIA REV: CPT | Mod: CPTII,,, | Performed by: NURSE PRACTITIONER

## 2022-09-07 PROCEDURE — 99214 OFFICE O/P EST MOD 30 MIN: CPT | Mod: S$PBB,,, | Performed by: NURSE PRACTITIONER

## 2022-09-07 PROCEDURE — 4010F PR ACE/ARB THEARPY RXD/TAKEN: ICD-10-PCS | Mod: CPTII,,, | Performed by: NURSE PRACTITIONER

## 2022-09-07 PROCEDURE — 1159F MED LIST DOCD IN RCRD: CPT | Mod: CPTII,,, | Performed by: NURSE PRACTITIONER

## 2022-09-07 PROCEDURE — 99214 OFFICE O/P EST MOD 30 MIN: CPT | Mod: PBBFAC | Performed by: NURSE PRACTITIONER

## 2022-09-07 PROCEDURE — 3008F BODY MASS INDEX DOCD: CPT | Mod: CPTII,,, | Performed by: NURSE PRACTITIONER

## 2022-09-07 PROCEDURE — 1159F PR MEDICATION LIST DOCUMENTED IN MEDICAL RECORD: ICD-10-PCS | Mod: CPTII,,, | Performed by: NURSE PRACTITIONER

## 2022-09-07 PROCEDURE — 3074F PR MOST RECENT SYSTOLIC BLOOD PRESSURE < 130 MM HG: ICD-10-PCS | Mod: CPTII,,, | Performed by: NURSE PRACTITIONER

## 2022-09-07 NOTE — PROGRESS NOTES
"[facility]  Nephrology Clinic Note   Chief Complaint   Patient presents with    Chronic Kidney Disease     RTC, took meds, w/o complaints      History of Present Illness  Pedro Vergara is a 61 y.o. Black or  female with past medical history of chronic kidney disease, diabetes mellitus, hypertension, hyperlipidemia, glaucoma, and obesity. Presents for scheduled follow-up appointment in nephrology clinic.    Review of Systems  Twelve point review of systems conducted, negative except as stated in history of present illness.    Review of patient's allergies indicates:   Allergen Reactions    Adhesive tape-silicones        Past Medical History:   Past Medical History:   Diagnosis Date    Anxiety     CKD (chronic kidney disease)     Depression     Diabetes mellitus, type 2     HLD (hyperlipidemia)     Hypertension     Obesity, unspecified     Unspecified glaucoma        Procedure History:   Past Surgical History:   Procedure Laterality Date    CARPAL TUNNEL RELEASE      CARPAL TUNNEL RELEASE Right 07/06/2016    COLONOSCOPY  08/22/2014    HYSTERECTOMY      tendon sheath incision      TONSILLECTOMY         Family History: family history includes Cancer in her father and mother; Diabetes in her paternal grandmother; Hypertension in her father.    Social History:  reports that she has never smoked. She has never used smokeless tobacco. She reports that she does not currently use alcohol. She reports that she does not currently use drugs.    Physical Exam:   /68 (BP Location: Right arm, Patient Position: Sitting, BP Method: Large (Automatic))   Pulse 62   Temp 98.2 °F (36.8 °C) (Oral)   Resp 18   Ht 5' 1.81" (1.57 m)   Wt 81.3 kg (179 lb 3.2 oz)   SpO2 100%   BMI 32.98 kg/m²     General appearance: Patient is in no acute distress.  Skin: No rashes or wounds.  HEENT: PERRLA, EOMI, no scleral icterus, no JVD. Neck is supple.  Chest: Respirations are unlabored. Lungs " "sounds are clear.   Heart: S1, S2.   Abdomen: Benign.  : Deferred.  Extremities: No edema, peripheral pulses are palpable.   Neuro: No focal deficits.     Home Medications:    Current Outpatient Medications:     ACCU-CHEK GUIDE TEST STRIPS Strp, CHECK GLUCOSE TWICE A DAY, Disp: , Rfl:     acyclovir 5% (ZOVIRAX) 5 % ointment, APPLY TO AFFECTED AREA EVERY 4 HOURS, Disp: , Rfl:     atorvastatin (LIPITOR) 10 MG tablet, Take 1 tablet (10 mg total) by mouth once daily., Disp: 90 tablet, Rfl: 2    BD NORMA 2ND GEN PEN NEEDLE 32 gauge x 5/32" Ndle, USE DAILY TO INJECT INSULIN, Disp: , Rfl:     BD ULTRA-FINE SHORT PEN NEEDLE 31 gauge x 5/16" Ndle, USE DAILY AS DIRECTED, Disp: , Rfl:     blood sugar diagnostic (ACCU-CHEK GUIDE TEST STRIPS MISC),  ACCU-CHEK GUIDE TEST STRIP, See Instructions, USE ONE STRIP TO TEST BLOOD GLUCOSE TWICE DAILY, # 50 unknown unit, 3 Refill(s), Pharmacy: [facility] ST[id number], 158, cm, Height/Length Dosing, 11/18/21 13:08:00 CST, 85.9, kg, Weight Dosing, 11/18/21 13:08:00 CST, Disp: , Rfl:     cetirizine (ZYRTEC) 10 MG tablet, Take 1 tablet (10 mg total) by mouth once daily., Disp: 90 tablet, Rfl: 2    diclofenac sodium (VOLTAREN) 1 % Gel, APPLY 2 GRAMS TO AFFECTED AREA 3 TIMES A DAY, Disp: , Rfl:     DULoxetine (CYMBALTA) 60 MG capsule, Take 60 mg by mouth nightly., Disp: , Rfl:     glimepiride (AMARYL) 2 MG tablet, TAKE 1 TABLET BY MOUTH EVERY DAY WITH THE FIRST MEAL OF THE DAY, Disp: 30 tablet, Rfl: 5    JARDIANCE 25 mg tablet, TAKE 1 TABLET BY MOUTH EVERY DAY IN THE MORNING, Disp: 90 tablet, Rfl: 1    LANTUS SOLOSTAR U-100 INSULIN glargine 100 units/mL (3mL) SubQ pen, 12 Units once daily., Disp: , Rfl:     lisinopriL (PRINIVIL,ZESTRIL) 5 MG tablet, Take 1 tablet (5 mg total) by mouth once daily., Disp: 90 tablet, Rfl: 2    medical supply, miscellaneous (0.2 MICRON FILTER ATTACHMENT MIS),  BD UF short Pen Needle 8MMx 31g, See Instructions, Use daily to inject insulin' Dx Code E11.9 LON 99 mon, # 100 " "EA, 11 Refill(s), Pharmacy: [facility]/pharmacy #[id number], 157, cm, Height/Length Dosing, 06/10/21 13:27:00 CDT, 85.4, kg, Weight Dosing, 06/10/21 1..., Disp: , Rfl:     metoprolol succinate (TOPROL-XL) 25 MG 24 hr tablet, Take 1 tablet (25 mg total) by mouth once daily., Disp: 90 tablet, Rfl: 2    nitroGLYCERIN (NITROSTAT) 0.4 MG SL tablet, Place 0.4 mg under the tongue., Disp: , Rfl:     pen needle, diabetic 32 gauge x 5/32" Ndle,  BD yanelis insulin pen needle, See Instructions, Use daily to inject insulin Dx Code E11.9, # 100 EA, 11 Refill(s), Pharmacy: [facility]/pharmacy #[id number], 157, cm, Height/Length Dosing, 10/11/21 12:53:00 CDT, 83.9, kg, Weight Dosing, 10/11/21 12:53:00 CDT, Disp: , Rfl:     TRULICITY 1.5 mg/0.5 mL pen injector, INJECT 1.5 MG SUBCUTANEOUSLY EVERY WEEK, Disp: 4 pen, Rfl: 4    scopolamine (TRANSDERM-SCOP) 1.3-1.5 mg (1 mg over 3 days), Place 1 patch onto the skin every 72 hours. (Patient not taking: No sig reported), Disp: 3 patch, Rfl: 0    valACYclovir (VALTREX) 1000 MG tablet, TAKE 2 TABLETS BY MOUTH ONCE, Disp: , Rfl:      Laboratory Data:   09/02/2022   Sodium 137, potassium 4.4, chloride 107, CO2 28, BUN 22, creatinine 1.18, glucose 138, GFR 51, calcium 9.3, albumin 4.1, ALT 16, AST 25, alkaline phosphatase 69.     Imaging:  (01/05/2021 US Retroperitoneum Limited)  FINDINGS:   The right kidney measures 9.1 cm.  The left kidney measures 8.6 cm.  No hydronephrosis.  Normal renal echogenicity.      Impression and Plan     CKD stage G3a/A1, GFR 45-59 and albumin creatinine ratio <30 mg/g  -     Protein/Creatinine Ratio, Urine; Future; Expected date: 08/07/2023  -     Comprehensive Metabolic Panel; Future; Expected date: 08/07/2023  -     Urinalysis, Reflex to Urine Culture Urine, Clean Catch; Future; Expected date: 08/07/2023  Diabetic kidney disease.  Continue SGLT 2 inhibitor and ACE-inhibitor.  Continue renal sparing activities.  Return to clinic with routine labs in 6 months.    Primary hypertension  Blood " pressure is at goal.  Continue current medication regimen.    BMI 32.0-32.9,adult  Lifestyle and dietary interventions discussed, patient counseled on weight loss using portion control, non sedentary lifestyle, low-carbohydrate/low fat diet.

## 2022-09-08 DIAGNOSIS — E11.65 UNCONTROLLED TYPE 2 DIABETES MELLITUS WITH HYPERGLYCEMIA: Primary | ICD-10-CM

## 2022-09-08 NOTE — TELEPHONE ENCOUNTER
----- Message from Dipti Stubbs sent at 9/8/2022  1:57 PM CDT -----  Regarding: [facility]: Herbie JIMENEZ PT       Pt needs refills on Lantus.   [facility] in [place]   Pt #[phone]

## 2022-09-09 RX ORDER — INSULIN GLARGINE 100 [IU]/ML
18 INJECTION, SOLUTION SUBCUTANEOUS DAILY
Qty: 15 ML | Refills: 2 | Status: SHIPPED | OUTPATIENT
Start: 2022-09-09 | End: 2022-12-19

## 2022-10-16 ENCOUNTER — HOSPITAL ENCOUNTER (EMERGENCY)
Facility: HOSPITAL | Age: 62
Discharge: HOME | End: 2022-10-16
Attending: FAMILY MEDICINE
Payer: MEDICAID

## 2022-10-16 VITALS
TEMPERATURE: 98 F | OXYGEN SATURATION: 99 % | WEIGHT: 169 LBS | RESPIRATION RATE: 18 BRPM | HEART RATE: 66 BPM | HEIGHT: 62 IN | BODY MASS INDEX: 31.1 KG/M2 | DIASTOLIC BLOOD PRESSURE: 88 MMHG | SYSTOLIC BLOOD PRESSURE: 134 MMHG

## 2022-10-16 DIAGNOSIS — R53.1 WEAKNESS: ICD-10-CM

## 2022-10-16 DIAGNOSIS — R42 VERTIGO: Primary | ICD-10-CM

## 2022-10-16 DIAGNOSIS — M79.629 PAIN IN AXILLA, UNSPECIFIED LATERALITY: ICD-10-CM

## 2022-10-16 DIAGNOSIS — B37.31 VAGINAL YEAST INFECTION: ICD-10-CM

## 2022-10-16 LAB
ALBUMIN SERPL-MCNC: 3.7 GM/DL (ref 3.4–4.8)
ALBUMIN/GLOB SERPL: 1.2 RATIO (ref 1.1–2)
ALP SERPL-CCNC: 68 UNIT/L (ref 40–150)
ALT SERPL-CCNC: 13 UNIT/L (ref 0–55)
AST SERPL-CCNC: 14 UNIT/L (ref 5–34)
BASOPHILS # BLD AUTO: 0.05 X10(3)/MCL (ref 0–0.2)
BASOPHILS NFR BLD AUTO: 0.6 %
BILIRUBIN DIRECT+TOT PNL SERPL-MCNC: 0.5 MG/DL
BNP BLD-MCNC: 15.9 PG/ML
BUN SERPL-MCNC: 8 MG/DL (ref 9.8–20.1)
CALCIUM SERPL-MCNC: 9 MG/DL (ref 8.4–10.2)
CHLORIDE SERPL-SCNC: 107 MMOL/L (ref 98–107)
CO2 SERPL-SCNC: 24 MMOL/L (ref 23–31)
CREAT SERPL-MCNC: 0.95 MG/DL (ref 0.55–1.02)
EOSINOPHIL # BLD AUTO: 0.29 X10(3)/MCL (ref 0–0.9)
EOSINOPHIL NFR BLD AUTO: 3.5 %
ERYTHROCYTE [DISTWIDTH] IN BLOOD BY AUTOMATED COUNT: 12.9 % (ref 11.5–17)
FLUAV AG UPPER RESP QL IA.RAPID: NOT DETECTED
FLUBV AG UPPER RESP QL IA.RAPID: NOT DETECTED
GFR SERPLBLD CREATININE-BSD FMLA CKD-EPI: >60 MLS/MIN/1.73/M2
GLOBULIN SER-MCNC: 3.2 GM/DL (ref 2.4–3.5)
GLUCOSE SERPL-MCNC: 146 MG/DL (ref 82–115)
HCT VFR BLD AUTO: 40.4 % (ref 37–47)
HGB BLD-MCNC: 12.8 GM/DL (ref 12–16)
IMM GRANULOCYTES # BLD AUTO: 0.03 X10(3)/MCL (ref 0–0.04)
IMM GRANULOCYTES NFR BLD AUTO: 0.4 %
LIPASE SERPL-CCNC: 42 U/L
LYMPHOCYTES # BLD AUTO: 3.71 X10(3)/MCL (ref 0.6–4.6)
LYMPHOCYTES NFR BLD AUTO: 44.6 %
MAGNESIUM SERPL-MCNC: 1.8 MG/DL (ref 1.6–2.6)
MCH RBC QN AUTO: 28.4 PG (ref 27–31)
MCHC RBC AUTO-ENTMCNC: 31.7 MG/DL (ref 33–36)
MCV RBC AUTO: 89.8 FL (ref 80–94)
MONOCYTES # BLD AUTO: 0.69 X10(3)/MCL (ref 0.1–1.3)
MONOCYTES NFR BLD AUTO: 8.3 %
NEUTROPHILS # BLD AUTO: 3.5 X10(3)/MCL (ref 2.1–9.2)
NEUTROPHILS NFR BLD AUTO: 42.6 %
PLATELET # BLD AUTO: 289 X10(3)/MCL (ref 130–400)
PMV BLD AUTO: 10.2 FL (ref 7.4–10.4)
POTASSIUM SERPL-SCNC: 3.8 MMOL/L (ref 3.5–5.1)
PROT SERPL-MCNC: 6.9 GM/DL (ref 5.8–7.6)
RBC # BLD AUTO: 4.5 X10(6)/MCL (ref 4.2–5.4)
SARS-COV-2 RNA RESP QL NAA+PROBE: NOT DETECTED
SODIUM SERPL-SCNC: 139 MMOL/L (ref 136–145)
TROPONIN I SERPL-MCNC: <0.01 NG/ML (ref 0–0.04)
TSH SERPL-ACNC: 0.58 UIU/ML (ref 0.35–4.94)
WBC # SPEC AUTO: 8.3 X10(3)/MCL (ref 4.5–11.5)

## 2022-10-16 PROCEDURE — 85025 COMPLETE CBC W/AUTO DIFF WBC: CPT | Performed by: FAMILY MEDICINE

## 2022-10-16 PROCEDURE — 36415 COLL VENOUS BLD VENIPUNCTURE: CPT | Performed by: FAMILY MEDICINE

## 2022-10-16 PROCEDURE — 84484 ASSAY OF TROPONIN QUANT: CPT | Performed by: FAMILY MEDICINE

## 2022-10-16 PROCEDURE — 93005 ELECTROCARDIOGRAM TRACING: CPT

## 2022-10-16 PROCEDURE — 99285 EMERGENCY DEPT VISIT HI MDM: CPT | Mod: 25

## 2022-10-16 PROCEDURE — 83880 ASSAY OF NATRIURETIC PEPTIDE: CPT | Performed by: FAMILY MEDICINE

## 2022-10-16 PROCEDURE — 80053 COMPREHEN METABOLIC PANEL: CPT | Performed by: FAMILY MEDICINE

## 2022-10-16 PROCEDURE — 0241U COVID/FLU A&B PCR: CPT | Performed by: FAMILY MEDICINE

## 2022-10-16 PROCEDURE — 25000003 PHARM REV CODE 250: Performed by: FAMILY MEDICINE

## 2022-10-16 PROCEDURE — 83735 ASSAY OF MAGNESIUM: CPT | Performed by: FAMILY MEDICINE

## 2022-10-16 PROCEDURE — 84443 ASSAY THYROID STIM HORMONE: CPT | Performed by: FAMILY MEDICINE

## 2022-10-16 PROCEDURE — 93010 ELECTROCARDIOGRAM REPORT: CPT | Mod: ,,, | Performed by: INTERNAL MEDICINE

## 2022-10-16 PROCEDURE — 83690 ASSAY OF LIPASE: CPT | Performed by: FAMILY MEDICINE

## 2022-10-16 PROCEDURE — 93010 EKG 12-LEAD: ICD-10-PCS | Mod: ,,, | Performed by: INTERNAL MEDICINE

## 2022-10-16 RX ORDER — FLUCONAZOLE 150 MG/1
150 TABLET ORAL DAILY
Qty: 1 TABLET | Refills: 0 | Status: SHIPPED | OUTPATIENT
Start: 2022-10-16 | End: 2022-10-17

## 2022-10-16 RX ORDER — MECLIZINE HCL 12.5 MG 12.5 MG/1
12.5 TABLET ORAL
Status: COMPLETED | OUTPATIENT
Start: 2022-10-16 | End: 2022-10-16

## 2022-10-16 RX ORDER — MECLIZINE HYDROCHLORIDE 25 MG/1
25 TABLET ORAL 3 TIMES DAILY PRN
Qty: 20 TABLET | Refills: 0 | Status: SHIPPED | OUTPATIENT
Start: 2022-10-16 | End: 2022-12-20 | Stop reason: SDUPTHER

## 2022-10-16 RX ADMIN — MECLIZINE 12.5 MG: 12.5 TABLET ORAL at 05:10

## 2022-10-16 NOTE — ED PROVIDER NOTES
Encounter Date: 10/16/2022       History     Chief Complaint   Patient presents with    Dizziness    Weakness     Weak and dizzy for 2 days. Patient says she was so weak she couldn't get out of the bed. Patient walked to triage and appeared normal. Patient has a myriad of other minor intermittent complaints including runny nose, diarrhea, appetite issues, strange sensations, and blisters under the nose, painful armpits, and others.     61-year-old female with a history of anxiety, diabetes, hypertension presents with intermittent dizziness starting this morning.  Patient tells me she gets so dizzy that it hurts her stomach and makes her have diarrhea.  Patient tells me she has had this before in the past and it resolved on its own.  She has also been worked up by her PCP and never given a definitive diagnosis.  She tells me she has had imaging of her brain before and it was normal.  Patient also complaining of nontraumatic bilateral axillary pain worse with palpation. Denies rash erythema or swelling.  Feels like a sharp sticking pain.  She is also requesting medication for a vaginal yeast infection.  Patient has vaginal itching and believes it is from yeast.  Patient denies dysuria or hematuria.  Denies vaginal discharge or STD exposure.  Denies headache or change in vision.  Denies chest pain or shortness of breath.  No other complaints.    Review of patient's allergies indicates:   Allergen Reactions    Adhesive tape-silicones      Past Medical History:   Diagnosis Date    Anxiety     CKD (chronic kidney disease)     Depression     Diabetes mellitus, type 2     HLD (hyperlipidemia)     Hypertension     Obesity, unspecified     Unspecified glaucoma      Past Surgical History:   Procedure Laterality Date    CARPAL TUNNEL RELEASE      CARPAL TUNNEL RELEASE Right 07/06/2016    COLONOSCOPY  08/22/2014    HYSTERECTOMY      tendon sheath incision      TONSILLECTOMY       Family History   Problem Relation Age of Onset     Cancer Mother     Cancer Father     Hypertension Father     Diabetes Paternal Grandmother      Social History     Tobacco Use    Smoking status: Never    Smokeless tobacco: Never   Substance Use Topics    Alcohol use: Not Currently    Drug use: Not Currently     Review of Systems   Constitutional: Negative.    HENT: Negative.     Eyes: Negative.    Respiratory: Negative.     Cardiovascular: Negative.    Gastrointestinal: Negative.    Endocrine: Negative.    Genitourinary: Negative.    Musculoskeletal: Negative.    Skin: Negative.    Allergic/Immunologic: Negative.    Neurological:  Positive for dizziness.   Hematological: Negative.    Psychiatric/Behavioral: Negative.       Physical Exam     Initial Vitals [10/16/22 1556]   BP Pulse Resp Temp SpO2   134/88 66 18 97.8 °F (36.6 °C) 99 %      MAP       --         Physical Exam    Nursing note and vitals reviewed.  Constitutional: She appears well-developed and well-nourished.   Nontoxic-appearing female.  Speaking in complete sentences.   HENT:   Head: Normocephalic and atraumatic.   Nose: Nose normal.   Mouth/Throat: Oropharynx is clear and moist.   Eyes: Conjunctivae and EOM are normal. Pupils are equal, round, and reactive to light.   Neck: Neck supple.   Normal range of motion.  Cardiovascular:  Normal rate and regular rhythm.           Pulmonary/Chest: Breath sounds normal.   Abdominal: Abdomen is soft. Bowel sounds are normal.   Musculoskeletal:         General: Normal range of motion.      Cervical back: Normal range of motion and neck supple.     Neurological: She is alert and oriented to person, place, and time. She has normal strength. No cranial nerve deficit or sensory deficit. GCS score is 15. GCS eye subscore is 4. GCS verbal subscore is 5. GCS motor subscore is 6.   Skin: Skin is warm. Capillary refill takes less than 2 seconds.   Bilateral axillas-mild amount of hair.  No erythema inflammation or rash.  Patient reports mild tenderness with palpation.    Psychiatric: She has a normal mood and affect.       ED Course   Procedures  Labs Reviewed   COMPREHENSIVE METABOLIC PANEL - Abnormal; Notable for the following components:       Result Value    Glucose Level 146 (*)     Blood Urea Nitrogen 8.0 (*)     All other components within normal limits   CBC WITH DIFFERENTIAL - Abnormal; Notable for the following components:    MCHC 31.7 (*)     All other components within normal limits   COVID/FLU A&B PCR - Normal    Narrative:     The Xpert Xpress SARS-CoV-2/FLU/RSV plus is a rapid, multiplexed real-time PCR test intended for the simultaneous qualitative detection and differentiation of SARS-CoV-2, Influenza A, Influenza B, and respiratory syncytial virus (RSV) viral RNA in either nasopharyngeal swab or nasal swab specimens.         B-TYPE NATRIURETIC PEPTIDE - Normal   LIPASE - Normal   MAGNESIUM - Normal   TROPONIN I - Normal   TSH - Normal   CBC W/ AUTO DIFFERENTIAL    Narrative:     The following orders were created for panel order CBC auto differential.  Procedure                               Abnormality         Status                     ---------                               -----------         ------                     CBC with Differential[[id number]]        Abnormal            Final result                 Please view results for these tests on the individual orders.   DRUG SCREEN, URINE (BEAKER)   URINALYSIS, REFLEX TO URINE CULTURE     EKG Readings: (Independently Interpreted)   Rhythm: Normal Sinus Rhythm. Heart Rate: 68. Ectopy: No Ectopy. Conduction: Normal. ST Segments: Normal ST Segments. T Waves: Normal. Axis: Normal. Clinical Impression: Normal Sinus Rhythm   ECG Results              EKG 12-lead (In process)  Result time 10/16/22 16:58:44      In process by Interface, Lab In [facility] (10/16/22 16:58:44)                   Narrative:    Test Reason : R53.1,    Vent. Rate : 068 BPM     Atrial Rate : 068 BPM     P-R Int : 138 ms          QRS Dur : 076 ms       QT Int : 386 ms       P-R-T Axes : 033 016 021 degrees     QTc Int : 410 ms    Normal sinus rhythm with sinus arrhythmia  Minimal voltage criteria for LVH, may be normal variant  Borderline Abnormal ECG  No previous ECGs available    Referred By: [email or web address]   SELF           Confirmed By:                                   Imaging Results              X-Ray Chest 1 View (Final result)  Result time 10/16/22 16:26:57      Final result by Angelo Duarte MD (10/16/22 16:26:57)                   Impression:      1. No evidence of acute cardiopulmonary abnormality.      Electronically signed by: Angelo Duarte MD  Date:    10/16/2022  Time:    16:26               Narrative:    EXAMINATION:  XR CHEST 1 VIEW    CLINICAL HISTORY:  cough;    TECHNIQUE:  Single frontal view of the chest was performed.    COMPARISON:  None    FINDINGS:  Heart size is normal.  There is no pulmonary edema.  There is no focal consolidation, pleural effusion or pneumothorax.                                       Medications   meclizine tablet 12.5 mg (12.5 mg Oral Given 10/16/22 1750)     Medical Decision Making:   Clinical Tests:   Lab Tests: Ordered and Reviewed  The following lab test(s) were unremarkable: CBC, CMP and Troponin  Radiological Study: Ordered and Reviewed  ED Management:  Patient is nontoxic-appearing in no acute distress.  Vital signs stable.  Benign physical exam.  No focal neurological deficits.  No indication for CT head at this time.  Workup in the ED shows no acute pathology.  Will give a trial of meclizine for dizziness.  Diflucan for presumed yeast infection.  Encouraged patient to call follow-up her PCP as soon as possible further evaluation.  Strict return to ER precautions given, patient voiced understanding.                        Clinical Impression:   Final diagnoses:  [R53.1] Weakness  [R42] Vertigo (Primary)  [M79.629] Pain in axilla, unspecified laterality  [B37.31] Vaginal yeast infection        ED  Disposition Condition    Discharge Stable          ED Prescriptions       Medication Sig Dispense Start Date End Date Auth. Provider    meclizine (ANTIVERT) 25 mg tablet Take 1 tablet (25 mg total) by mouth 3 (three) times daily as needed for Dizziness. 20 tablet 10/16/2022 10/21/2022 German Daniels MD    fluconazole (DIFLUCAN) 150 MG Tab Take 1 tablet (150 mg total) by mouth once daily. for 1 day 1 tablet 10/16/2022 10/17/2022 German Daniels MD          Follow-up Information       Follow up With Specialties Details Why Contact Info    [facility] In [facility] day  [address] [address]  [phone]               German Daniels MD  10/16/22 7364

## 2022-12-20 ENCOUNTER — OFFICE VISIT (OUTPATIENT)
Dept: ENDOCRINOLOGY | Facility: CLINIC | Age: 62
End: 2022-12-20
Payer: MEDICAID

## 2022-12-20 VITALS
DIASTOLIC BLOOD PRESSURE: 65 MMHG | WEIGHT: 180.5 LBS | TEMPERATURE: 98 F | SYSTOLIC BLOOD PRESSURE: 114 MMHG | RESPIRATION RATE: 20 BRPM | HEART RATE: 70 BPM | BODY MASS INDEX: 33.21 KG/M2 | HEIGHT: 62 IN

## 2022-12-20 DIAGNOSIS — R42 VERTIGO: ICD-10-CM

## 2022-12-20 DIAGNOSIS — N28.9 NEPHROPATHY: ICD-10-CM

## 2022-12-20 DIAGNOSIS — Z79.4 TYPE 2 DIABETES MELLITUS WITHOUT COMPLICATION, WITH LONG-TERM CURRENT USE OF INSULIN: Primary | ICD-10-CM

## 2022-12-20 DIAGNOSIS — E11.9 TYPE 2 DIABETES MELLITUS WITHOUT COMPLICATION, WITH LONG-TERM CURRENT USE OF INSULIN: Primary | ICD-10-CM

## 2022-12-20 DIAGNOSIS — Z12.39 ENCOUNTER FOR SCREENING FOR MALIGNANT NEOPLASM OF BREAST, UNSPECIFIED SCREENING MODALITY: ICD-10-CM

## 2022-12-20 DIAGNOSIS — E16.2 HYPOGLYCEMIA: ICD-10-CM

## 2022-12-20 DIAGNOSIS — E11.65 TYPE 2 DIABETES MELLITUS WITH HYPERGLYCEMIA: ICD-10-CM

## 2022-12-20 DIAGNOSIS — G62.9 NEUROPATHY: ICD-10-CM

## 2022-12-20 PROBLEM — N18.9 CHRONIC KIDNEY DISEASE: Status: ACTIVE | Noted: 2022-12-20

## 2022-12-20 PROCEDURE — 1160F RVW MEDS BY RX/DR IN RCRD: CPT | Mod: CPTII,,, | Performed by: NURSE PRACTITIONER

## 2022-12-20 PROCEDURE — 4010F ACE/ARB THERAPY RXD/TAKEN: CPT | Mod: CPTII,,, | Performed by: NURSE PRACTITIONER

## 2022-12-20 PROCEDURE — 1159F PR MEDICATION LIST DOCUMENTED IN MEDICAL RECORD: ICD-10-PCS | Mod: CPTII,,, | Performed by: NURSE PRACTITIONER

## 2022-12-20 PROCEDURE — 4010F PR ACE/ARB THEARPY RXD/TAKEN: ICD-10-PCS | Mod: CPTII,,, | Performed by: NURSE PRACTITIONER

## 2022-12-20 PROCEDURE — 3074F SYST BP LT 130 MM HG: CPT | Mod: CPTII,,, | Performed by: NURSE PRACTITIONER

## 2022-12-20 PROCEDURE — 3061F NEG MICROALBUMINURIA REV: CPT | Mod: CPTII,,, | Performed by: NURSE PRACTITIONER

## 2022-12-20 PROCEDURE — 99215 OFFICE O/P EST HI 40 MIN: CPT | Mod: PBBFAC | Performed by: NURSE PRACTITIONER

## 2022-12-20 PROCEDURE — 3066F PR DOCUMENTATION OF TREATMENT FOR NEPHROPATHY: ICD-10-PCS | Mod: CPTII,,, | Performed by: NURSE PRACTITIONER

## 2022-12-20 PROCEDURE — 1159F MED LIST DOCD IN RCRD: CPT | Mod: CPTII,,, | Performed by: NURSE PRACTITIONER

## 2022-12-20 PROCEDURE — 3078F DIAST BP <80 MM HG: CPT | Mod: CPTII,,, | Performed by: NURSE PRACTITIONER

## 2022-12-20 PROCEDURE — 3008F BODY MASS INDEX DOCD: CPT | Mod: CPTII,,, | Performed by: NURSE PRACTITIONER

## 2022-12-20 PROCEDURE — 3008F PR BODY MASS INDEX (BMI) DOCUMENTED: ICD-10-PCS | Mod: CPTII,,, | Performed by: NURSE PRACTITIONER

## 2022-12-20 PROCEDURE — 3078F PR MOST RECENT DIASTOLIC BLOOD PRESSURE < 80 MM HG: ICD-10-PCS | Mod: CPTII,,, | Performed by: NURSE PRACTITIONER

## 2022-12-20 PROCEDURE — 1160F PR REVIEW ALL MEDS BY PRESCRIBER/CLIN PHARMACIST DOCUMENTED: ICD-10-PCS | Mod: CPTII,,, | Performed by: NURSE PRACTITIONER

## 2022-12-20 PROCEDURE — 83036 HEMOGLOBIN GLYCOSYLATED A1C: CPT | Mod: PBBFAC | Performed by: NURSE PRACTITIONER

## 2022-12-20 PROCEDURE — 3061F PR NEG MICROALBUMINURIA RESULT DOCUMENTED/REVIEW: ICD-10-PCS | Mod: CPTII,,, | Performed by: NURSE PRACTITIONER

## 2022-12-20 PROCEDURE — 99214 PR OFFICE/OUTPT VISIT, EST, LEVL IV, 30-39 MIN: ICD-10-PCS | Mod: S$PBB,,, | Performed by: NURSE PRACTITIONER

## 2022-12-20 PROCEDURE — 3051F HG A1C>EQUAL 7.0%<8.0%: CPT | Mod: CPTII,,, | Performed by: NURSE PRACTITIONER

## 2022-12-20 PROCEDURE — 3074F PR MOST RECENT SYSTOLIC BLOOD PRESSURE < 130 MM HG: ICD-10-PCS | Mod: CPTII,,, | Performed by: NURSE PRACTITIONER

## 2022-12-20 PROCEDURE — 3051F PR MOST RECENT HEMOGLOBIN A1C LEVEL 7.0 - < 8.0%: ICD-10-PCS | Mod: CPTII,,, | Performed by: NURSE PRACTITIONER

## 2022-12-20 PROCEDURE — 99214 OFFICE O/P EST MOD 30 MIN: CPT | Mod: S$PBB,,, | Performed by: NURSE PRACTITIONER

## 2022-12-20 PROCEDURE — 3066F NEPHROPATHY DOC TX: CPT | Mod: CPTII,,, | Performed by: NURSE PRACTITIONER

## 2022-12-20 RX ORDER — DULAGLUTIDE 1.5 MG/.5ML
INJECTION, SOLUTION SUBCUTANEOUS
Qty: 4 PEN | Refills: 11 | Status: SHIPPED | OUTPATIENT
Start: 2022-12-20 | End: 2023-06-01

## 2022-12-20 RX ORDER — INSULIN GLARGINE 100 [IU]/ML
18 INJECTION, SOLUTION SUBCUTANEOUS DAILY
Qty: 15 EACH | Refills: 6 | Status: SHIPPED | OUTPATIENT
Start: 2022-12-20 | End: 2024-03-25

## 2022-12-20 RX ORDER — MECLIZINE HYDROCHLORIDE 25 MG/1
25 TABLET ORAL 3 TIMES DAILY PRN
Qty: 21 TABLET | Refills: 1 | Status: SHIPPED | OUTPATIENT
Start: 2022-12-20 | End: 2022-12-30 | Stop reason: SDUPTHER

## 2022-12-20 RX ORDER — GLIMEPIRIDE 2 MG/1
TABLET ORAL
Qty: 90 TABLET | Refills: 3 | Status: SHIPPED | OUTPATIENT
Start: 2022-12-20 | End: 2024-03-01 | Stop reason: SDUPTHER

## 2022-12-20 RX ORDER — PEN NEEDLE, DIABETIC 30 GX3/16"
NEEDLE, DISPOSABLE MISCELLANEOUS
Qty: 90 EACH | Refills: 3 | Status: SHIPPED | OUTPATIENT
Start: 2022-12-20

## 2022-12-20 NOTE — PROGRESS NOTES
Subjective:       Patient ID: Pedro Vergara is a 62 y.o. female.    Chief Complaint: Diabetes (Follow up )    Previous endocrine clinic notes      03/05/2021 Endocrine Clinic Note: Ms. Pedro Vergara is a 58 yo F with PMHx of T2DM, vitamin D deficiency, CKD stage III, and hypertension. She presents today for follow-up regarding diabetes management. She is currently taking Victoza 1.8, glimepiride 4, Lantus 25U, and Jardiance 10 in the am. She states that she checks blood glucose at once in the morning and once at night and endorses that she often has hypoglycemia in the mornings reaching blood glucoses in the 60s. Other times she states that her blood glucose is higher reaching around the 140s-150s. She is compliant with her diabetes medication regimen and expresses that her diet is much improved from her previous visit, eating more vegetables and healthy meats. She admits that she does not always eat breakfast, which might be contributing to her hypoglycemic episodes. Today, she says she is feeling fatigued, which she attributes to her recent COVID vaccine and hypoglycemia. (1) -Last A1C on 3/3/21 7.5% (down 8.8% in 12/2020), trending in proper direction, A1C goal <7%  -Continue Lantus 25U, Victoza 1.8 mg  -Decrease glimepiride to 2mg at breakfast with none at night given hypoglycemic episodes; if pt does not eat breakfast, instructed to take glimepiride with largest meal  -Increase Jardiance to 25 mg in am given still having hyperglycemic episodes  -Maintain fasting glucose between  and post-prandial glucose <180  -Counseled patient on importance of bringing glucose log to next visit, patient agreeable  -Encouraged patient to continue with healthy diet and to ensure she eats to prevent hypoglycemic episodes (2)     06/10/2021 Endocrine Clinic Note: 60-year-old female scheduled today as endocrine clinic follow-up.  History of uncontrolled type 2 diabetes, CKD stage III, hypertension, hyperlipidemia,  vitamin D deficiency.  Patient was previously referred to endocrine clinic is here today for follow-up visit.  Current A1c 7.9 increased from Previous A1c 7.5,  previous 8.8 and 11.0.  Patient has only checks CBG's about once a week range of 110-157.  Patient denies any symptoms of hypoglycemia.  On patient's previous visit glimepiride was changed from twice a day to once in the morning due to a.m. hypoglycemia which has resolved.  Patient was started on Victoza and was to titrate up as stated currently she is on 1.2 mg.  Hypertension at goal today.  CKD/nephropathy Patient is seen by renal clinic previous renal functions GFR 55, creatinine 1.26, BUN 16 improved from previous renal functions on 8/11/2020 GFR 42, creatinine 42, BUN 1.6.  Neuropathy patient was put on gabapentin and sees a podiatrist but was recently referred to neurologist due to sciatica. (1)     10/11/2021 Endocrine Clinic Note: 60-year-old female scheduled today for endocrine clinic follow-up.  History of uncontrolled type 2 diabetes, CKD stage III, hypertension, hyperlipidemia and vitamin D deficiency.  Uncontrolled type 2 diabetes current A1c 7.2 improved from previous 7.9, 7.5, 8.8 and 11.0. Patient checks CBGs daily fasting ranges .  Patient has frequent hypoglycemia in the a.m.  When discussing with patient patient needs at 6 PM and at times does not eat until the next day at noon.  Discussed with patient today eating breakfast in the morning.  Also DM education will discussed with patient today eating 3 meals per day. Neuropathy patient is currently on gabapentin.  Nephropathy urine micro elevated 10/27/2020 on lisinopril 5 mg repeat urine micro today.  Hypertension at goal. CKD patient is followed by renal clinic.  Patient has fever blisters breaking out around her mouth previously renal clinic is giving her Valtrex 1 g x 2 doses longer dosing not recommended due to renal functions.  Nephropathy urine micro mild elevation 12/30/2021  patient is currently on low-dose ACE.  Neuropathy discussed with patient controlling diabetes.     Endocrine clinic note 08/04/2022: 61-year-old female scheduled today for endocrine clinic follow-up.  History of uncontrolled type 2 diabetes with hypoglycemia, CKD stage 3, hypertension, hyperlipidemia vitamin-D deficiency.  Uncontrolled type 2 diabetes current A1c 7.5 previous 7.2 and 7.9.  Patient checks CBG is 1-2 times per day CBG's range fasting  in the am.  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  Will need patient's CGM alert patient hypoglycemia.  Patient reports current A1c slightly increased due to her being on a cruise recently and states she being large meals and lots of meals overnight on ADA diet.  She returned from the cruise a few days ago.    Current Endocrine clinic note 12/20/2022:  Female scheduled today as endocrine clinic follow-up.  History of type 2 diabetes, CKD stage 3 with nephropathy/neuropathy, hypertension, hyperlipidemia vitamin-D deficiency.  Type 2 diabetes current A1c 8.4 previous A1C 7.5, 7.6 and 7.2.  On patient's previous visit patient was having hypoglycemia on basal insulin was decreased from 22 units to 18 units.  Patient continued to have hypoglycemia Lantus was decreased to 12 units.  Patient had increasing A1c in the last month and a half patient has had severe vertigo and has not been active in his closely staying in bed due to severe vertigo.  Patient is in walk-in clinic and states she was giving doses of meclizine which helped but she ran out.  Patient also reports popping to bilateral ears patient has a prescription and Zyrtec but has not been taking.  Instructed patient to start Zyrtec daily use meclizine as needed and will refer  her to ENT clinic.  Patient checks CBGS 3-4 times per week ranges 76 to 170. Nephropathy patient is currently on an Ace urine micro on 08/04/2022 normal.  Patient has CKD stage 3 and is currently seen in renal Clinic encouraged patient to keep all appointments.  Patient has had recent improvement in kidney function.  Neuropathy foot exam done 08/14/2022.     Review of Systems   Constitutional:  Negative for activity change, appetite change and fatigue.   HENT:  Negative for dental problem, hearing loss, tinnitus, trouble swallowing and goiter.    Eyes:  Negative for photophobia, pain and visual disturbance.   Respiratory:  Negative for cough, chest tightness and wheezing.    Cardiovascular:  Negative for chest pain, palpitations and leg swelling.   Gastrointestinal:  Negative for abdominal pain, constipation, diarrhea, nausea and reflux.   Endocrine: Negative for cold intolerance, heat intolerance, polydipsia and polyphagia.   Genitourinary:  Negative for difficulty urinating, flank pain, hematuria, hot flashes, menstrual irregularity, menstrual problem, nocturia and urgency.   Musculoskeletal:  Negative for back pain, gait problem, joint swelling, leg pain and joint deformity.   Integumentary:  Negative for color change, pallor, rash and breast discharge.   Allergic/Immunologic: Negative for environmental allergies, food allergies and immunocompromised state.   Neurological:  Negative for tremors, seizures, headaches, coordination difficulties, memory loss and coordination difficulties.   Psychiatric/Behavioral:  Negative for agitation, behavioral problems and sleep disturbance. The patient is not nervous/anxious.        Objective:      Physical Exam  Constitutional:       General: She is not in acute distress.     Appearance: Normal appearance. She is not ill-appearing.   HENT:      Head: Normocephalic and atraumatic.      Right Ear: External ear normal.      Left Ear: External ear normal.      Nose: Nose normal.  No congestion or rhinorrhea.      Mouth/Throat:      Mouth: Mucous membranes are moist.      Pharynx: Oropharynx is clear. No oropharyngeal exudate.   Eyes:      General:         Right eye: No discharge.         Left eye: No discharge.      Conjunctiva/sclera: Conjunctivae normal.      Pupils: Pupils are equal, round, and reactive to light.   Neck:      Thyroid: No thyroid mass, thyromegaly or thyroid tenderness.   Cardiovascular:      Rate and Rhythm: Normal rate and regular rhythm.      Pulses: Normal pulses.      Heart sounds: Normal heart sounds. No murmur heard.  Pulmonary:      Effort: Pulmonary effort is normal. No respiratory distress.      Breath sounds: Normal breath sounds.   Abdominal:      General: Abdomen is flat. Bowel sounds are normal. There is no distension.      Palpations: Abdomen is soft.      Tenderness: There is no abdominal tenderness.   Musculoskeletal:         General: No swelling or tenderness. Normal range of motion.      Cervical back: Normal range of motion and neck supple. No tenderness.      Right lower leg: No edema.      Left lower leg: No edema.   Feet:      Right foot:      Skin integrity: Skin integrity normal.      Left foot:      Skin integrity: Skin integrity normal.   Lymphadenopathy:      Cervical: No cervical adenopathy.   Skin:     General: Skin is warm and dry.      Coloration: Skin is not jaundiced or pale.   Neurological:      General: No focal deficit present.      Mental Status: She is alert and oriented to person, place, and time. Mental status is at baseline.      Coordination: Coordination normal.      Gait: Gait normal.   Psychiatric:         Mood and Affect: Mood normal.         Behavior: Behavior normal.         Thought Content: Thought content normal.       Assessment:       Problem List Items Addressed This Visit          Endocrine    Diabetes mellitus - Primary (Chronic)    Relevant Medications    dulaglutide (TRULICITY) 1.5 mg/0.5 mL pen injector     "empagliflozin (JARDIANCE) 25 mg tablet    insulin (LANTUS SOLOSTAR U-100 INSULIN) glargine 100 units/mL SubQ pen    glimepiride (AMARYL) 2 MG tablet    pen needle, diabetic 32 gauge x 5/32" Ndle    Other Relevant Orders    Hemoglobin A1C, POCT     Other Visit Diagnoses       Neuropathy        Nephropathy        Hypoglycemia        Vertigo        Relevant Medications    meclizine (ANTIVERT) 25 mg tablet    Other Relevant Orders    Ambulatory referral/consult to ENT    Encounter for screening for malignant neoplasm of breast, unspecified screening modality        Relevant Orders    Mammo Digital Screening Bilat w/ Salomon    Type 2 diabetes mellitus with hyperglycemia        Relevant Medications    dulaglutide (TRULICITY) 1.5 mg/0.5 mL pen injector    insulin (LANTUS SOLOSTAR U-100 INSULIN) glargine 100 units/mL SubQ pen    glimepiride (AMARYL) 2 MG tablet    Uncontrolled type 2 diabetes mellitus with hyperglycemia        Relevant Medications    dulaglutide (TRULICITY) 1.5 mg/0.5 mL pen injector    insulin (LANTUS SOLOSTAR U-100 INSULIN) glargine 100 units/mL SubQ pen    glimepiride (AMARYL) 2 MG tablet    Type 2 diabetes mellitus without complications        Relevant Medications    dulaglutide (TRULICITY) 1.5 mg/0.5 mL pen injector    insulin (LANTUS SOLOSTAR U-100 INSULIN) glargine 100 units/mL SubQ pen    glimepiride (AMARYL) 2 MG tablet    Other Relevant Orders    Hemoglobin A1C, POCT            Plan:       Type 2 diabetes mellitus without complication, with long-term current use of insulin  Current A1C 8.4 Previous A1C 7.5,  7.6, 7.2, 7.9, 7.5, 8.8, 11.0  Urine Micro Creatine/ratio: 08/04/2022 normal   Medications: Glimepiride 2 mg Qday, Jardiance 25mg Qday Lantus 18 units (now 12 units) , Trulicity 1.5mg Changes: Change Lantus to am   Eye Exam: 11/14/2022 Fundus  Foot Exam: 08/04/2022  Home Glucometer Use: Once a day   Last Hypoglycemic episode: Every 4 days in the morning  Follow ADA diet, avoid soda, simple " "sweets, and limit rice, breads and starches  Maintain healthy weight, exercise 4-5 times a week for 30 minutes  Diet and medication compliance discussed on visit  RTC 4 months w/ BMP, A1C, urine micro   Component Ref Range & Units 4 mo ago   (8/4/22) 10 mo ago   (2/11/22) 1 yr ago   (9/23/21) 1 yr ago   (3/3/21) 2 yr ago   (8/11/20) 2 yr ago   (1/27/20) 3 yr ago   (6/24/19)   Hemoglobin A1C % 7.5  7.5 R  7.2 High  R  7.5 High  R  11.0 High  R  8.4 High  R  8.0 High  R       -     Hemoglobin A1C, POCT  -     dulaglutide (TRULICITY) 1.5 mg/0.5 mL pen injector; INJECT 1.5 MG SUBCUTANEOUSLY EVERY WEEK  Dispense: 4 pen; Refill: 11  -     empagliflozin (JARDIANCE) 25 mg tablet; Take 1 tablet (25 mg total) by mouth once daily. for 30 doses  Dispense: 90 tablet; Refill: 3  -     insulin (LANTUS SOLOSTAR U-100 INSULIN) glargine 100 units/mL SubQ pen; Inject 18 Units into the skin once daily.  Dispense: 15 each; Refill: 6  -     pen needle, diabetic 32 gauge x 5/32" Ndle; BD yanelis insulin pen needle for once a day injections  Dispense: 90 each; Refill: 3    Neuropathy  Foot Exam: 08/04/2022    Nephropathy  Urine Micro Creatine/ratio: 12/30/2021 mild elevation  Repeat urine micro 08/04/2022 normal   On a low-dose ACE  Component Ref Range & Units 4 mo ago 10 mo ago 11 mo ago 2 yr ago 3 yr ago   Urine Microalbumin <=30.0 ug/ml <5.0  <5.0 R    9.3 R    Urine Creatinine 47.0 - 110.0 mg/dL 82.9  107.9 R  128.5 High  R  112.8 High  R  169.0 R    Microalbumin Creatinine Ratio 0.0 - 30.0 mg/gm Cr <6.0  <4.6 R    5.5 R         Hypoglycemia  Occasional in am   Change Lantus to am     Vertigo  Refer to ENT  Take Zyrtec daily  Use Meclizine PRN   -     Ambulatory referral/consult to ENT; Future; Expected date: 12/27/2022  -     meclizine (ANTIVERT) 25 mg tablet; Take 1 tablet (25 mg total) by mouth 3 (three) times daily as needed for Dizziness.  Dispense: 21 tablet; Refill: 1    Encounter for screening for malignant neoplasm of breast, " unspecified screening modality  -     Mammo Digital Screening Bilat w/ Salomon; Future; Expected date: 12/20/2022            I spent a total of 30 minutes on the day of the visit.  This includes face to face time and non-face to face time preparing to see the patient (eg, review of tests), obtaining and/or reviewing separately obtained history, documenting clinical information in the electronic or other health record, independently interpreting results and communicating results to the patient/family/caregiver, or care coordinator.

## 2022-12-30 ENCOUNTER — HOSPITAL ENCOUNTER (OUTPATIENT)
Dept: RADIOLOGY | Facility: HOSPITAL | Age: 62
Discharge: HOME | End: 2022-12-30
Attending: NURSE PRACTITIONER
Payer: MEDICAID

## 2022-12-30 ENCOUNTER — OFFICE VISIT (OUTPATIENT)
Dept: INTERNAL MEDICINE | Facility: CLINIC | Age: 62
End: 2022-12-30
Payer: MEDICAID

## 2022-12-30 VITALS
DIASTOLIC BLOOD PRESSURE: 76 MMHG | SYSTOLIC BLOOD PRESSURE: 109 MMHG | HEART RATE: 62 BPM | BODY MASS INDEX: 33.01 KG/M2 | WEIGHT: 179.38 LBS | TEMPERATURE: 98 F | HEIGHT: 62 IN | RESPIRATION RATE: 18 BRPM

## 2022-12-30 DIAGNOSIS — M54.2 NECK PAIN: ICD-10-CM

## 2022-12-30 DIAGNOSIS — Z79.4 TYPE 2 DIABETES MELLITUS WITHOUT COMPLICATION, WITH LONG-TERM CURRENT USE OF INSULIN: Chronic | ICD-10-CM

## 2022-12-30 DIAGNOSIS — R42 DIZZINESS AND GIDDINESS: ICD-10-CM

## 2022-12-30 DIAGNOSIS — E66.09 CLASS 1 OBESITY DUE TO EXCESS CALORIES WITH SERIOUS COMORBIDITY AND BODY MASS INDEX (BMI) OF 32.0 TO 32.9 IN ADULT: Chronic | ICD-10-CM

## 2022-12-30 DIAGNOSIS — I10 PRIMARY HYPERTENSION: Chronic | ICD-10-CM

## 2022-12-30 DIAGNOSIS — E11.9 TYPE 2 DIABETES MELLITUS WITHOUT COMPLICATION, WITH LONG-TERM CURRENT USE OF INSULIN: Chronic | ICD-10-CM

## 2022-12-30 DIAGNOSIS — R42 VERTIGO: ICD-10-CM

## 2022-12-30 DIAGNOSIS — M54.2 NECK PAIN: Primary | ICD-10-CM

## 2022-12-30 DIAGNOSIS — M25.561 CHRONIC PAIN OF RIGHT KNEE: ICD-10-CM

## 2022-12-30 DIAGNOSIS — N18.31 STAGE 3A CHRONIC KIDNEY DISEASE: Chronic | ICD-10-CM

## 2022-12-30 DIAGNOSIS — E78.2 MIXED HYPERLIPIDEMIA: Chronic | ICD-10-CM

## 2022-12-30 DIAGNOSIS — G89.29 CHRONIC PAIN OF RIGHT KNEE: ICD-10-CM

## 2022-12-30 PROCEDURE — 3074F PR MOST RECENT SYSTOLIC BLOOD PRESSURE < 130 MM HG: ICD-10-PCS | Mod: CPTII,,, | Performed by: NURSE PRACTITIONER

## 2022-12-30 PROCEDURE — 1159F MED LIST DOCD IN RCRD: CPT | Mod: CPTII,,, | Performed by: NURSE PRACTITIONER

## 2022-12-30 PROCEDURE — 99214 PR OFFICE/OUTPT VISIT, EST, LEVL IV, 30-39 MIN: ICD-10-PCS | Mod: S$PBB,,, | Performed by: NURSE PRACTITIONER

## 2022-12-30 PROCEDURE — 3008F BODY MASS INDEX DOCD: CPT | Mod: CPTII,,, | Performed by: NURSE PRACTITIONER

## 2022-12-30 PROCEDURE — 1160F RVW MEDS BY RX/DR IN RCRD: CPT | Mod: CPTII,,, | Performed by: NURSE PRACTITIONER

## 2022-12-30 PROCEDURE — 3078F DIAST BP <80 MM HG: CPT | Mod: CPTII,,, | Performed by: NURSE PRACTITIONER

## 2022-12-30 PROCEDURE — 72040 X-RAY EXAM NECK SPINE 2-3 VW: CPT | Mod: TC

## 2022-12-30 PROCEDURE — 3078F PR MOST RECENT DIASTOLIC BLOOD PRESSURE < 80 MM HG: ICD-10-PCS | Mod: CPTII,,, | Performed by: NURSE PRACTITIONER

## 2022-12-30 PROCEDURE — 3008F PR BODY MASS INDEX (BMI) DOCUMENTED: ICD-10-PCS | Mod: CPTII,,, | Performed by: NURSE PRACTITIONER

## 2022-12-30 PROCEDURE — 3074F SYST BP LT 130 MM HG: CPT | Mod: CPTII,,, | Performed by: NURSE PRACTITIONER

## 2022-12-30 PROCEDURE — 99215 OFFICE O/P EST HI 40 MIN: CPT | Mod: PBBFAC | Performed by: NURSE PRACTITIONER

## 2022-12-30 PROCEDURE — 1159F PR MEDICATION LIST DOCUMENTED IN MEDICAL RECORD: ICD-10-PCS | Mod: CPTII,,, | Performed by: NURSE PRACTITIONER

## 2022-12-30 PROCEDURE — 73560 X-RAY EXAM OF KNEE 1 OR 2: CPT | Mod: TC,RT

## 2022-12-30 PROCEDURE — 99214 OFFICE O/P EST MOD 30 MIN: CPT | Mod: S$PBB,,, | Performed by: NURSE PRACTITIONER

## 2022-12-30 PROCEDURE — 1160F PR REVIEW ALL MEDS BY PRESCRIBER/CLIN PHARMACIST DOCUMENTED: ICD-10-PCS | Mod: CPTII,,, | Performed by: NURSE PRACTITIONER

## 2022-12-30 RX ORDER — MECLIZINE HYDROCHLORIDE 25 MG/1
50 TABLET ORAL 2 TIMES DAILY PRN
Qty: 60 TABLET | Refills: 2 | Status: SHIPPED | OUTPATIENT
Start: 2022-12-30 | End: 2023-02-09

## 2022-12-30 RX ORDER — ATORVASTATIN CALCIUM 10 MG/1
10 TABLET, FILM COATED ORAL DAILY
Qty: 90 TABLET | Refills: 2 | Status: SHIPPED | OUTPATIENT
Start: 2022-12-30 | End: 2023-06-12 | Stop reason: SDUPTHER

## 2022-12-30 RX ORDER — CYCLOBENZAPRINE HCL 10 MG
10 TABLET ORAL NIGHTLY PRN
Qty: 10 TABLET | Refills: 2 | Status: SHIPPED | OUTPATIENT
Start: 2022-12-30 | End: 2023-02-09 | Stop reason: SDUPTHER

## 2022-12-30 NOTE — PROGRESS NOTES
Subjective:       Patient ID: Pedro Vergara is a 62 y.o. female.    Chief Complaint: Follow-up, Results, and Leg Pain (C/o dizziness ears popping. Has appointment with ENT next month2. Has multi complaints B. Leg pain with knees popping R more so. 3. Neck pain 4. L armpit pain.)    Patient has diagnosis of HLD, HTN, DM2, CKD, Obesity. Patient seen in clinic today for HLD. Patient last seen in clinic on 06/29/2022. Patient currently on Atorvastatin 10 mg daily. Patient states taking medication as prescribed, denies side effects. Today, patient states right knee pain and BUE pain. Patient states taking Tylenol as needed for pain without relief. Patient denies injury. Patient denies any other acute complaints.      Patient states followed Dr. Reymundo Avery, Podiatrist in [place].      Patient states followed by Medical Clinic in [place] [facility].     Patient followed by Endocrinology Clinic. Last appointment on 12/20/2022. Female scheduled today as endocrine clinic follow-up.  History of type 2 diabetes, CKD stage 3 with nephropathy/neuropathy, hypertension, hyperlipidemia vitamin-D deficiency.  Type 2 diabetes current A1c 8.4 previous A1C 7.5, 7.6 and 7.2.  On patient's previous visit patient was having hypoglycemia on basal insulin was decreased from 22 units to 18 units.  Patient continued to have hypoglycemia Lantus was decreased to 12 units.  Patient had increasing A1c in the last month and a half patient has had severe vertigo and has not been active in his closely staying in bed due to severe vertigo.  Patient is in walk-in clinic and states she was giving doses of meclizine which helped but she ran out.  Patient also reports popping to bilateral ears patient has a prescription and Zyrtec but has not been taking.  Instructed patient to start Zyrtec daily use meclizine as needed and will refer her to ENT clinic.  Patient checks CBGS 3-4 times per week ranges 76 to 170. Nephropathy patient is currently  on an Ace urine micro on 08/04/2022 normal.  Patient has CKD stage 3 and is currently seen in renal Clinic encouraged patient to keep all appointments.  Patient has had recent improvement in kidney function.  Neuropathy foot exam done 08/14/2022. Type 2 diabetes mellitus without complication, with long-term current use of insulin: Current A1C 8.4 Previous A1C 7.5,  7.6, 7.2, 7.9, 7.5, 8.8, 11.0; Urine Micro Creatine/ratio: 08/04/2022 normal; Medications: Glimepiride 2 mg Qday, Jardiance 25mg Qday Lantus 18 units (now 12 units) , Trulicity 1.5mg Changes: Change Lantus to am; Eye Exam: 11/14/2022 Fundus; Foot Exam: 08/04/2022; Home Glucometer Use: Once a day; Last Hypoglycemic episode: Every 4 days in the morning; Follow ADA diet, avoid soda, simple sweets, and limit rice, breads and starches; Maintain healthy weight, exercise 4-5 times a week for 30 minutes; Diet and medication compliance discussed on visit; RTC 4 months w/ BMP, A1C, urine micro. Patient has follow up appointment on 06/01/2023.     Patient followed by Nephrology Clinic. Last appointment on 09/07/2022. Pedro Vergara is a 61 y.o. Black or  female with past medical history of chronic kidney disease, diabetes mellitus, hypertension, hyperlipidemia, glaucoma, and obesity. Presents for scheduled follow-up appointment in nephrology clinic. Dx: CKD stage G3a/A1, GFR 45-59 and albumin creatinine ratio <30 mg/g: Diabetic kidney disease.  Continue SGLT 2 inhibitor and ACE-inhibitor.  Continue renal sparing activities.  Return to clinic with routine labs in 6 months. Patient has follow up appointment scheduled for 09/06/2023.     Patient seen by ENT clinic on 11/18/2021 for allergic rhinitis. Patient instructed to use AYR gel or Vaseline to right nares tid, Zyrtec daily. Patient to follow up as needed.     Patient seen by Cardiology Clinic for abnormal stress test. Last appointment on 07/09/2021. She completed an echocardiogram on 6.3.21 that  revealed an intact ejection fraction approximately 55% (see full report below). The patient had a Lexiscan stress test on 4.5.21 that revealed possible apical ischemia.  She had a subsequent LHC on 6.7.21 that revealed normal coronaries arteries. Continue ASA, Lipitor, lisinopril, and SL nitro prn. Follow Patient was to follow up in Cardiology clinic in 5 months, no follow up noted. Follow up with PCP as directed        Mammogram: 06/08/2020, ordered  PAP: Hysterectomy  FIT: 05/05/2022, negative  Colonoscopy: 08/22/2014  Diabetic Eye Exam: 11/14/2022  Diabetic Foot Exam: 08/04/2022    Review of Systems   Constitutional: Negative.    HENT: Negative.     Eyes: Negative.    Respiratory: Negative.     Cardiovascular: Negative.    Gastrointestinal: Negative.    Endocrine: Negative.    Genitourinary: Negative.    Musculoskeletal:  Positive for arthralgias.   Integumentary:  Negative.   Allergic/Immunologic: Negative.    Neurological: Negative.    Hematological: Negative.    Psychiatric/Behavioral: Negative.         Objective:      Physical Exam  Vitals reviewed.   Constitutional:       Appearance: Normal appearance.   HENT:      Head: Normocephalic and atraumatic.      Mouth/Throat:      Mouth: Mucous membranes are moist.      Pharynx: Oropharynx is clear.   Eyes:      Extraocular Movements: Extraocular movements intact.      Conjunctiva/sclera: Conjunctivae normal.      Pupils: Pupils are equal, round, and reactive to light.   Cardiovascular:      Rate and Rhythm: Normal rate and regular rhythm.      Heart sounds: Normal heart sounds.   Pulmonary:      Effort: Pulmonary effort is normal.      Breath sounds: Normal breath sounds.   Abdominal:      General: Bowel sounds are normal.   Musculoskeletal:         General: Normal range of motion.      Cervical back: Normal range of motion. Tenderness present.   Skin:     General: Skin is warm and dry.   Neurological:      Mental Status: She is alert and oriented to person,  place, and time.   Psychiatric:         Mood and Affect: Mood normal.         Behavior: Behavior normal.       Assessment:       Problem List Items Addressed This Visit          Cardiac/Vascular    Hyperlipidemia (Chronic)     Continue Atorvastatin 10 mg daily  Weight loss encouraged  Low fat/high fiber diet  Increase physical activity  Cholesterol Total   Date Value Ref Range Status   12/30/2022 172 <=200 mg/dL Final     Comment:     Please send orders to [facility]     HDL Cholesterol   Date Value Ref Range Status   12/30/2022 47 35 - 60 mg/dL Final     Comment:     Please send orders to [facility]     Triglyceride   Date Value Ref Range Status   12/30/2022 211 (H) 37 - 140 mg/dL Final     Comment:     Please send orders to [facility]     LDL Cholesterol   Date Value Ref Range Status   12/30/2022 83.00 50.00 - 140.00 mg/dL Final          Hypertension (Chronic)     Vitals:    12/30/22 1050   BP: 109/76   Pulse: 62   Resp: 18   Temp: 98.2 °F (36.8 °C)      Urine Creatinine   Date Value Ref Range Status   12/30/2022 34.8 (L) 47.0 - 110.0 mg/dL Final     Comment:     Please send orders to [facility]      No results found for: EKG   Continue Lisinopril 5 mg daily, Metoprolol Succ 25 mg daily  DASH diet  Encouraged home blood pressure monitoring            Renal/    Chronic kidney disease (Chronic)     Followed by Nephrology Clinic            Endocrine    Type 2 diabetes mellitus without complication, with long-term current use of insulin (Chronic)     Followed by Endocrinology Clinic         Class 1 obesity due to excess calories with serious comorbidity and body mass index (BMI) of 32.0 to 32.9 in adult (Chronic)     Body mass index is 32.8 kg/m².  Thyroid Stimulating Hormone   Date Value Ref Range Status   12/30/2022 1.416 0.350 - 4.940 uIU/mL Final     Comment:     Please send orders to [facility]     Vit D 25 OH   Date Value  Ref Range Status   10/27/2020 18.9 (L) 30.0 - 80.0 ng/mL Final     Hemoglobin A1c   Date Value Ref Range Status   12/30/2022 8.6 (H) <=7.0 % Final     Comment:     Please send orders to [facility]      Sleep Study:  Weight Loss Encouraged  Increase Physical Activity            Orthopedic    Neck pain - Primary     Cervical X-ray ordered  Start Flexeril 10 mg Qhs prn muscle pain         Relevant Orders    X-Ray Cervical Spine 2 or 3 Views (Completed)    Chronic pain of right knee     Right knee X-ray ordered         Relevant Orders    X-Ray Knee 1 or 2 View Right (Completed)     Other Visit Diagnoses       Dizziness and giddiness        Vertigo        Relevant Medications    meclizine (ANTIVERT) 25 mg tablet            Plan:    Patient to follow up in 6 weeks to reassess neck/knee pain

## 2023-01-03 PROBLEM — M54.2 NECK PAIN: Status: ACTIVE | Noted: 2023-01-03

## 2023-01-03 PROBLEM — E66.811 CLASS 1 OBESITY DUE TO EXCESS CALORIES WITH SERIOUS COMORBIDITY AND BODY MASS INDEX (BMI) OF 32.0 TO 32.9 IN ADULT: Chronic | Status: ACTIVE | Noted: 2022-06-29

## 2023-01-03 PROBLEM — M25.561 CHRONIC PAIN OF RIGHT KNEE: Status: ACTIVE | Noted: 2023-01-03

## 2023-01-03 PROBLEM — Z79.4 TYPE 2 DIABETES MELLITUS WITHOUT COMPLICATION, WITH LONG-TERM CURRENT USE OF INSULIN: Chronic | Status: ACTIVE | Noted: 2022-06-29

## 2023-01-03 PROBLEM — E66.09 CLASS 1 OBESITY DUE TO EXCESS CALORIES WITH SERIOUS COMORBIDITY AND BODY MASS INDEX (BMI) OF 32.0 TO 32.9 IN ADULT: Chronic | Status: ACTIVE | Noted: 2022-06-29

## 2023-01-03 PROBLEM — N18.9 CHRONIC KIDNEY DISEASE: Chronic | Status: ACTIVE | Noted: 2022-12-20

## 2023-01-03 PROBLEM — G89.29 CHRONIC PAIN OF RIGHT KNEE: Status: ACTIVE | Noted: 2023-01-03

## 2023-01-03 NOTE — ASSESSMENT & PLAN NOTE
Body mass index is 32.8 kg/m².  Thyroid Stimulating Hormone   Date Value Ref Range Status   12/30/2022 1.416 0.350 - 4.940 uIU/mL Final     Comment:     Please send orders to [facility]     Vit D 25 OH   Date Value Ref Range Status   10/27/2020 18.9 (L) 30.0 - 80.0 ng/mL Final     Hemoglobin A1c   Date Value Ref Range Status   12/30/2022 8.6 (H) <=7.0 % Final     Comment:     Please send orders to [facility]      Sleep Study:  Weight Loss Encouraged  Increase Physical Activity

## 2023-01-03 NOTE — ASSESSMENT & PLAN NOTE
Continue Atorvastatin 10 mg daily  Weight loss encouraged  Low fat/high fiber diet  Increase physical activity  Cholesterol Total   Date Value Ref Range Status   12/30/2022 172 <=200 mg/dL Final     Comment:     Please send orders to [facility]     HDL Cholesterol   Date Value Ref Range Status   12/30/2022 47 35 - 60 mg/dL Final     Comment:     Please send orders to [facility]     Triglyceride   Date Value Ref Range Status   12/30/2022 211 (H) 37 - 140 mg/dL Final     Comment:     Please send orders to [facility]     LDL Cholesterol   Date Value Ref Range Status   12/30/2022 83.00 50.00 - 140.00 mg/dL Final

## 2023-01-03 NOTE — ASSESSMENT & PLAN NOTE
Vitals:    12/30/22 1050   BP: 109/76   Pulse: 62   Resp: 18   Temp: 98.2 °F (36.8 °C)      Urine Creatinine   Date Value Ref Range Status   12/30/2022 34.8 (L) 47.0 - 110.0 mg/dL Final     Comment:     Please send orders to [facility]      No results found for: EKG   Continue Lisinopril 5 mg daily, Metoprolol Succ 25 mg daily  DASH diet  Encouraged home blood pressure monitoring

## 2023-01-06 ENCOUNTER — OFFICE VISIT (OUTPATIENT)
Dept: OTOLARYNGOLOGY | Facility: CLINIC | Age: 63
End: 2023-01-06
Payer: MEDICAID

## 2023-01-06 DIAGNOSIS — H93.8X3 EAR PRESSURE, BILATERAL: ICD-10-CM

## 2023-01-06 DIAGNOSIS — R42 DIZZINESS: Primary | ICD-10-CM

## 2023-01-06 DIAGNOSIS — J31.0 CHRONIC RHINITIS: ICD-10-CM

## 2023-01-06 DIAGNOSIS — H91.90 HEARING LOSS, UNSPECIFIED HEARING LOSS TYPE, UNSPECIFIED LATERALITY: ICD-10-CM

## 2023-01-06 DIAGNOSIS — R09.81 NASAL CONGESTION: ICD-10-CM

## 2023-01-06 PROCEDURE — 1159F PR MEDICATION LIST DOCUMENTED IN MEDICAL RECORD: ICD-10-PCS | Mod: CPTII,95,, | Performed by: NURSE PRACTITIONER

## 2023-01-06 PROCEDURE — 1159F MED LIST DOCD IN RCRD: CPT | Mod: CPTII,95,, | Performed by: NURSE PRACTITIONER

## 2023-01-06 PROCEDURE — 99212 OFFICE O/P EST SF 10 MIN: CPT | Mod: 95,,, | Performed by: NURSE PRACTITIONER

## 2023-01-06 PROCEDURE — 99212 PR OFFICE/OUTPT VISIT, EST, LEVL II, 10-19 MIN: ICD-10-PCS | Mod: 95,,, | Performed by: NURSE PRACTITIONER

## 2023-01-06 RX ORDER — IPRATROPIUM BROMIDE 21 UG/1
1 SPRAY, METERED NASAL 4 TIMES DAILY
Qty: 30 ML | Refills: 5 | Status: SHIPPED | OUTPATIENT
Start: 2023-01-06 | End: 2023-05-22

## 2023-01-06 NOTE — PROGRESS NOTES
"Audio Only Telehealth Visit     The patient location is: state [facility]  The chief complaint leading to consultation is: dizziness  Visit type: Virtual visit with audio only (telephone)  Total time spent on visit: 25 min     The reason for the audio only service rather than synchronous audio and video virtual visit was related to technical difficulties or patient preference/necessity.     Each patient to whom I provide medical services by telemedicine is:  (1) informed of the relationship between the physician and patient and the respective role of any other health care provider with respect to management of the patient; and (2) notified that they may decline to receive medical services by telemedicine and may withdraw from such care at any time. Patient verbally consented to receive this service via voice-only telephone call.       HPI: 01/06/2023: 62yoF referred for dizziness. She reports this began acutely in November. It occurs multiple times daily and lasts 2 min or less. She describes this as room spinning and feeling as if she is on a merry go round. Denies prescyncope. She has to hold on to the wall to make it to the bathroom. States this occurs when she changes position such as rinsing as well as turning over in the bed. She has been to [facility] and received meclizine which helps but does not completely resolve symptoms. She also c/o rhinitis, nasal congestion, aural pressure, & ear popping. States "I feel like a little kid with a snotty nose all the time." Denies sneezing or itchy/watery eyes. Denies HL or tinnitus. Denies any previous otologic hx, procedures, or surgeries. Denies medication changes apart from meclizine. She uses zyrtec daily and astelin BID with minimal effective. Denies head trauma.      Assessment and plan: 62yoF referred for vertiginous dizziness, chronic rhinitis.   - Safety precautions  - Audio + Cheri Hallpike  - Continue zyrtec  - Atrovent nasal spray 3-4x/day prn  - RTC 4-6 weeks (after " audio)    Mable Alcocer NP    This service was not originating from a related E/M service provided within the previous 7 days nor will  to an E/M service or procedure within the next 24 hours or my soonest available appointment.  Prevailing standard of care was able to be met in this audio-only visit.

## 2023-01-11 ENCOUNTER — HOSPITAL ENCOUNTER (OUTPATIENT)
Dept: RADIOLOGY | Facility: HOSPITAL | Age: 63
Discharge: HOME | End: 2023-01-11
Attending: NURSE PRACTITIONER
Payer: MEDICAID

## 2023-01-11 DIAGNOSIS — Z12.39 ENCOUNTER FOR SCREENING FOR MALIGNANT NEOPLASM OF BREAST, UNSPECIFIED SCREENING MODALITY: ICD-10-CM

## 2023-01-11 LAB — HBA1C MFR BLD: 8.4 %

## 2023-01-11 PROCEDURE — 77067 SCR MAMMO BI INCL CAD: CPT | Mod: 26,,, | Performed by: RADIOLOGY

## 2023-01-11 PROCEDURE — 77063 BREAST TOMOSYNTHESIS BI: CPT | Mod: 26,,, | Performed by: RADIOLOGY

## 2023-01-11 PROCEDURE — 77063 MAMMO DIGITAL SCREENING BILAT WITH TOMO: ICD-10-PCS | Mod: 26,,, | Performed by: RADIOLOGY

## 2023-01-11 PROCEDURE — 77067 MAMMO DIGITAL SCREENING BILAT WITH TOMO: ICD-10-PCS | Mod: 26,,, | Performed by: RADIOLOGY

## 2023-01-11 PROCEDURE — 77067 SCR MAMMO BI INCL CAD: CPT | Mod: TC

## 2023-02-02 ENCOUNTER — CLINICAL SUPPORT (OUTPATIENT)
Dept: AUDIOLOGY | Facility: HOSPITAL | Age: 63
End: 2023-02-02
Payer: MEDICAID

## 2023-02-02 DIAGNOSIS — H91.90 HEARING LOSS, UNSPECIFIED HEARING LOSS TYPE, UNSPECIFIED LATERALITY: ICD-10-CM

## 2023-02-02 DIAGNOSIS — H90.3 SENSORINEURAL HEARING LOSS, BILATERAL: Primary | ICD-10-CM

## 2023-02-02 DIAGNOSIS — H81.11 BENIGN PAROXYSMAL POSITIONAL VERTIGO OF RIGHT EAR: ICD-10-CM

## 2023-02-02 DIAGNOSIS — R42 DIZZINESS: ICD-10-CM

## 2023-02-02 DIAGNOSIS — H93.8X3 EAR PRESSURE, BILATERAL: ICD-10-CM

## 2023-02-02 PROCEDURE — 95992 CANALITH REPOSITIONING PROC: CPT | Performed by: AUDIOLOGIST

## 2023-02-02 PROCEDURE — 92557 COMPREHENSIVE HEARING TEST: CPT | Performed by: AUDIOLOGIST

## 2023-02-02 PROCEDURE — 92567 TYMPANOMETRY: CPT | Performed by: AUDIOLOGIST

## 2023-02-02 NOTE — PROGRESS NOTES
Hearing Evaluation        Patient History: Ms. Vergara is in for a hearing evaluation and hallpike screening. She denies difficulty hearing, tinnitus, or middle ear issues. Main complaint is epidsodic vertigo, mainly occurring when turning to the right side in bed.  Symptoms started in November and seem to be increasing in frequency. She also reports associated nausea with symptoms but vomiting is denied.  All additional history is unremarkable.        Test Results:                    Pure Tone Testing:      Right ear:       Mild sensorineural hearing loss from at 8kHz. Speech reception threshold is in agreement with puretone findings.  Discrimination score of 100% is considered excellent.        Left ear:          Moderate sensorineural hearing loss from 6k-8kHz. Speech reception threshold is in agreement with puretone findings.  Discrimination score of 100% is considered excellent.                                                                            Tympanometry:                                           Right ear:       Type 'A' tymp, normal middle ear pressure/function     Left ear:          Type 'A' tymp, normal middle ear pressure/function        Hallpike screening:      Right ear: Positive for BPPV of the right posterior SCC.        Left ear: Negative for BPPV                            Interpretations:      Behavioral test findings indicate a high frequency SNHL, (Left>Right). Speech reception thresholds obtained at 15dB, AU, and are in agreement with puretone findings. Speech discrimination scores of 100%, AU, are considered excellent.  Immittance measures indicate normal middle ear pressure/function, bilaterally. Hallpike screening to the right side is a positive indicator for BPPV of the right posterior SCC.  Epley maneuver performed for treatment.  Note, crisis of tumarkin noted upon rising from treatment which made patient extremely nauseated.  Repeat hallpike after treatment indicated Epley  maneuver was successful as no nystagmus was observed with repetition.             Recommendations:   Ms. Vergara has a follow up with ENT for medical management. Annual hearing evaluations are recommended to monitor hearing loss. Post-maneuver instructions given and explained thoroughly with patient to ensure treatment is successful.  Will follow up with her in one week to determine if follow up epley is needed.

## 2023-02-09 ENCOUNTER — OFFICE VISIT (OUTPATIENT)
Dept: INTERNAL MEDICINE | Facility: CLINIC | Age: 63
End: 2023-02-09
Payer: MEDICAID

## 2023-02-09 DIAGNOSIS — E66.09 CLASS 1 OBESITY DUE TO EXCESS CALORIES WITH SERIOUS COMORBIDITY AND BODY MASS INDEX (BMI) OF 32.0 TO 32.9 IN ADULT: Chronic | ICD-10-CM

## 2023-02-09 DIAGNOSIS — M25.561 CHRONIC PAIN OF RIGHT KNEE: Primary | ICD-10-CM

## 2023-02-09 DIAGNOSIS — J30.9 ALLERGIC RHINITIS, UNSPECIFIED SEASONALITY, UNSPECIFIED TRIGGER: ICD-10-CM

## 2023-02-09 DIAGNOSIS — Z79.4 TYPE 2 DIABETES MELLITUS WITHOUT COMPLICATION, WITH LONG-TERM CURRENT USE OF INSULIN: Chronic | ICD-10-CM

## 2023-02-09 DIAGNOSIS — E78.2 MIXED HYPERLIPIDEMIA: Chronic | ICD-10-CM

## 2023-02-09 DIAGNOSIS — I10 PRIMARY HYPERTENSION: Chronic | ICD-10-CM

## 2023-02-09 DIAGNOSIS — G89.29 CHRONIC PAIN OF RIGHT KNEE: Primary | ICD-10-CM

## 2023-02-09 DIAGNOSIS — E11.9 TYPE 2 DIABETES MELLITUS WITHOUT COMPLICATION, WITH LONG-TERM CURRENT USE OF INSULIN: Chronic | ICD-10-CM

## 2023-02-09 DIAGNOSIS — R04.0 EPISTAXIS: ICD-10-CM

## 2023-02-09 DIAGNOSIS — M54.2 NECK PAIN: ICD-10-CM

## 2023-02-09 PROCEDURE — 99214 PR OFFICE/OUTPT VISIT, EST, LEVL IV, 30-39 MIN: ICD-10-PCS | Mod: FQ,95,, | Performed by: NURSE PRACTITIONER

## 2023-02-09 PROCEDURE — 1160F RVW MEDS BY RX/DR IN RCRD: CPT | Mod: CPTII,95,, | Performed by: NURSE PRACTITIONER

## 2023-02-09 PROCEDURE — 99214 OFFICE O/P EST MOD 30 MIN: CPT | Mod: FQ,95,, | Performed by: NURSE PRACTITIONER

## 2023-02-09 PROCEDURE — 4010F PR ACE/ARB THEARPY RXD/TAKEN: ICD-10-PCS | Mod: CPTII,95,, | Performed by: NURSE PRACTITIONER

## 2023-02-09 PROCEDURE — 1159F MED LIST DOCD IN RCRD: CPT | Mod: CPTII,95,, | Performed by: NURSE PRACTITIONER

## 2023-02-09 PROCEDURE — 4010F ACE/ARB THERAPY RXD/TAKEN: CPT | Mod: CPTII,95,, | Performed by: NURSE PRACTITIONER

## 2023-02-09 PROCEDURE — 1159F PR MEDICATION LIST DOCUMENTED IN MEDICAL RECORD: ICD-10-PCS | Mod: CPTII,95,, | Performed by: NURSE PRACTITIONER

## 2023-02-09 PROCEDURE — 1160F PR REVIEW ALL MEDS BY PRESCRIBER/CLIN PHARMACIST DOCUMENTED: ICD-10-PCS | Mod: CPTII,95,, | Performed by: NURSE PRACTITIONER

## 2023-02-09 RX ORDER — EMPAGLIFLOZIN 25 MG/1
25 TABLET, FILM COATED ORAL DAILY
COMMUNITY
Start: 2023-02-06 | End: 2024-03-17

## 2023-02-09 RX ORDER — PREGABALIN 50 MG/1
1 CAPSULE ORAL 2 TIMES DAILY
COMMUNITY
Start: 2023-01-08 | End: 2023-06-12

## 2023-02-09 RX ORDER — FAMOTIDINE 40 MG/1
40 TABLET, FILM COATED ORAL DAILY
Qty: 30 TABLET | Refills: 3 | Status: SHIPPED | OUTPATIENT
Start: 2023-02-09 | End: 2023-10-12

## 2023-02-09 RX ORDER — CYCLOBENZAPRINE HCL 10 MG
10 TABLET ORAL NIGHTLY PRN
Qty: 30 TABLET | Refills: 3 | Status: SHIPPED | OUTPATIENT
Start: 2023-02-09 | End: 2023-06-12 | Stop reason: SDUPTHER

## 2023-02-09 RX ORDER — MUPIROCIN 20 MG/G
OINTMENT TOPICAL 3 TIMES DAILY
Qty: 22 G | Refills: 1 | Status: SHIPPED | OUTPATIENT
Start: 2023-02-09 | End: 2024-02-15 | Stop reason: SDUPTHER

## 2023-02-09 NOTE — PROGRESS NOTES
Patient ID: [id number]     Chief Complaint: Follow-up (Had vertigo- states better. States stomach feels it's in knots since Thursday last week.)    HPI:     Established Patient - Audio Only Telehealth Visit     The patient location is: home  The chief complaint leading to consultation is: follow up neck/knee pain  Visit type: Virtual visit with audio only (telephone)  Total time spent with patient: 14 minutes    The reason for the audio only service rather than synchronous audio and video virtual visit was related to technical difficulties or patient preference/necessity.     Each patient to whom I provide medical services by telemedicine is:  (1) informed of the relationship between the physician and patient and the respective role of any other health care provider with respect to management of the patient; and (2) notified that they may decline to receive medical services by telemedicine and may withdraw from such care at any time. Patient verbally consented to receive this service via voice-only telephone call.    This service was not originating from a related E/M service provided within the previous 7 days nor will  to an E/M service or procedure within the next 24 hours or my soonest available appointment.  Prevailing standard of care was able to be met in this audio-only visit.      Pedro Vergara is a 62 y.o. female with diagnosis of HTN, HLD, DM2, CKD, Obesity. Patient seen per audio visit today for follow up on neck/knee pain. Patient last seen in clinic on 12/20/2022.   At previous appt; patient stated neck and right knee pain. Cervical X-ray completed on 12/30/2022; indicated no acute abnormality identified, multilevel degenerative changes of the cervical spine. Patient started on Flexeril Qhs prn pain; states pain improved. Right knee X-ray completed on 12/30/2022; indicated narrowing of the medial compartment of the knee joint with presence of marginal osteophytes articular spaces are otherwise  "preserved with smooth articular surfaces. Patient states Flexeril and Voltaren gel not helping with pain.   Patient denies any other acute complaints.     Patient seen by ENT clinic. Last appointment on 01/06/2023. 62yoF referred for dizziness. She reports this began acutely in November. It occurs multiple times daily and lasts 2 min or less. She describes this as room spinning and feeling as if she is on a merry go round. Denies prescyncope. She has to hold on to the wall to make it to the bathroom. States this occurs when she changes position such as rinsing as well as turning over in the bed. She has been [place] [facility] [place] meclizine which helps but does not completely resolve symptoms. She also c/o rhinitis, nasal congestion, aural pressure, & ear popping. States "I feel like a little kid with a snotty nose all the time." Denies sneezing or itchy/watery eyes. Denies HL or tinnitus. Denies any previous otologic hx, procedures, or surgeries. Denies medication changes apart from meclizine. She uses zyrtec daily and astelin BID with minimal effective. Denies head trauma. 62yoF referred for vertiginous dizziness, chronic rhinitis. Safety precautions. Audio + [place]. Continue zyrtec. Atrovent nasal spray 3-4x/day prn. RTC 4-6 weeks (after audio). Patient has follow up appointment scheduled for 02/22/2023.      Patient states followed Dr. Reymundo Avery, Podiatrist in [place].      Patient states followed by Medical Clinic in [place] eye [facility].     Patient followed by Endocrinology Clinic. Last appointment on 12/20/2022. Female scheduled today as endocrine clinic follow-up.  History of type 2 diabetes, CKD stage 3 with nephropathy/neuropathy, hypertension, hyperlipidemia vitamin-D deficiency.  Type 2 diabetes current A1c 8.4 previous A1C 7.5, 7.6 and 7.2.  On patient's previous visit patient was having hypoglycemia on basal insulin was decreased from 22 units to 18 units.  Patient continued to have " hypoglycemia Lantus was decreased to 12 units.  Patient had increasing A1c in the last month and a half patient has had severe vertigo and has not been active in his closely staying in bed due to severe vertigo.  Patient is in walk-in clinic and states she was giving doses of meclizine which helped but she ran out.  Patient also reports popping to bilateral ears patient has a prescription and Zyrtec but has not been taking.  Instructed patient to start Zyrtec daily use meclizine as needed and will refer her to ENT clinic.  Patient checks CBGS 3-4 times per week ranges 76 to 170. Nephropathy patient is currently on an Ace urine micro on 08/04/2022 normal.  Patient has CKD stage 3 and is currently seen in renal Clinic encouraged patient to keep all appointments.  Patient has had recent improvement in kidney function.  Neuropathy foot exam done 08/14/2022. Type 2 diabetes mellitus without complication, with long-term current use of insulin: Current A1C 8.4 Previous A1C 7.5,  7.6, 7.2, 7.9, 7.5, 8.8, 11.0; Urine Micro Creatine/ratio: 08/04/2022 normal; Medications: Glimepiride 2 mg Qday, Jardiance 25mg Qday Lantus 18 units (now 12 units) , Trulicity 1.5mg Changes: Change Lantus to am; Eye Exam: 11/14/2022 Fundus; Foot Exam: 08/04/2022; Home Glucometer Use: Once a day; Last Hypoglycemic episode: Every 4 days in the morning; Follow ADA diet, avoid soda, simple sweets, and limit rice, breads and starches; Maintain healthy weight, exercise 4-5 times a week for 30 minutes; Diet and medication compliance discussed on visit; RTC 4 months w/ BMP, A1C, urine micro. Patient has follow up appointment on 06/01/2023.     Patient followed by Nephrology Clinic. Last appointment on 09/07/2022. Pedro Vergara is a 61 y.o. Black or  female with past medical history of chronic kidney disease, diabetes mellitus, hypertension, hyperlipidemia, glaucoma, and obesity. Presents for scheduled follow-up appointment in  nephrology clinic. Dx: CKD stage G3a/A1, GFR 45-59 and albumin creatinine ratio <30 mg/g: Diabetic kidney disease.  Continue SGLT 2 inhibitor and ACE-inhibitor.  Continue renal sparing activities.  Return to clinic with routine labs in 6 months. Patient has follow up appointment scheduled for 09/06/2023.     Patient seen by Cardiology Clinic for abnormal stress test. Last appointment on 07/09/2021. She completed an echocardiogram on 6.3.21 that revealed an intact ejection fraction approximately 55% (see full report below). The patient had a Lexiscan stress test on 4.5.21 that revealed possible apical ischemia.  She had a subsequent LHC on 6.7.21 that revealed normal coronaries arteries. Continue ASA, Lipitor, lisinopril, and SL nitro prn. Follow Patient was to follow up in Cardiology clinic in 5 months, no follow up noted. Follow up with PCP as directed      Review of patient's allergies indicates:   Allergen Reactions    Adhesive tape-silicones        Breast Cancer Screening: MMG negative on 01/11/2023  Cervical Cancer Screening: Hysterectomy  Colorectal Cancer Screening: Colonoscopy on 08/14/2014 with no repeat recommendation  Diabetic Eye Exam: 11/14/2022  Diabetic Foot Exam: 08/04/2022  Lung Cancer Screening: N/A  Prostate Cancer Screening: N/A  AAA Screening: N/A  Osteoporosis Screening: deferred due to age  Medicare Wellness: N/A  Immunizations:   Immunization History   Administered Date(s) Administered    COVID-19, MRNA, LN-S, PF (Pfizer) (Gray Cap) 07/06/2022    COVID-19, MRNA, LN-S, PF (Pfizer) (Purple Cap) 02/10/2021, 03/03/2021, 10/11/2021    COVID-19, mRNA, LNP-S, bivalent booster, PF (PFIZER OMICRON) 11/01/2022    Hepatitis A / Hepatitis B 07/06/2022, 11/14/2022    Influenza 01/23/2013, 02/26/2014, 10/22/2014    Influenza (FLUBLOK) - Quadrivalent - Recombinant - PF *Preferred* (egg allergy) 10/02/2022    Influenza - Quadrivalent - MDCK - PF 10/18/2018, 10/06/2020    Influenza - Quadrivalent - PF  "*Preferred* (6 months and older) 09/13/2016, 09/21/2017, 11/10/2019, 09/25/2021    Influenza - Trivalent - PF (ADULT) 11/21/2015, 09/13/2016, 09/21/2017, 10/18/2018, 11/10/2019, 10/06/2020, 09/25/2021    Pneumococcal 02/25/2014    Pneumococcal Conjugate - 20 Valent 11/01/2022    Pneumococcal Polysaccharide - 23 Valent 09/10/2012    Tdap 06/20/2016    Zoster Recombinant 09/23/2021, 03/24/2022       Past Surgical History:   Procedure Laterality Date    CARPAL TUNNEL RELEASE      CARPAL TUNNEL RELEASE Right 07/06/2016    COLONOSCOPY  08/22/2014    HYSTERECTOMY      tendon sheath incision      TONSILLECTOMY         family history includes Cancer in her father and mother; Diabetes in her paternal grandmother; Hypertension in her father.    Social History     Socioeconomic History    Marital status: Single   Tobacco Use    Smoking status: Never    Smokeless tobacco: Never   Substance and Sexual Activity    Alcohol use: Not Currently    Drug use: Not Currently    Sexual activity: Not Currently     Social Determinants of Health     Physical Activity: Inactive    Days of Exercise per Week: 0 days    Minutes of Exercise per Session: 0 min       Current Outpatient Medications   Medication Instructions    ACCU-CHEK GUIDE TEST STRIPS Strp CHECK GLUCOSE TWICE A DAY    acyclovir 5% (ZOVIRAX) 5 % ointment APPLY TO AFFECTED AREA EVERY 4 HOURS    atorvastatin (LIPITOR) 10 mg, Oral, Daily    BD NORMA 2ND GEN PEN NEEDLE 32 gauge x 5/32" Ndle USE DAILY TO INJECT INSULIN    BD ULTRA-FINE SHORT PEN NEEDLE 31 gauge x 5/16" Ndle USE DAILY AS DIRECTED    blood sugar diagnostic (ACCU-CHEK GUIDE TEST STRIPS MISC)   ACCU-CHEK GUIDE TEST STRIP, See Instructions, USE ONE STRIP TO TEST BLOOD GLUCOSE TWICE DAILY, # 50 unknown unit, 3 Refill(s), Pharmacy: [id number], 158, cm, Height/Length Dosing, 11/18/21 13:08:00 CST, 85.9, kg, Weight Dosing, 11/18/21 13:08:00 CST    cetirizine (ZYRTEC) 10 mg, Oral, Daily    cyclobenzaprine (FLEXERIL) 10 mg, " "Oral, Nightly PRN    diclofenac sodium (VOLTAREN) 1 % Gel APPLY 2 GRAMS TO AFFECTED AREA 3 TIMES A DAY    dulaglutide (TRULICITY) 1.5 mg/0.5 mL pen injector INJECT 1.5 MG SUBCUTANEOUSLY EVERY WEEK    DULoxetine (CYMBALTA) 60 mg, Oral, Nightly    famotidine (PEPCID) 40 mg, Oral, Daily    glimepiride (AMARYL) 2 MG tablet TAKE 1 TABLET BY MOUTH EVERY DAY WITH THE FIRST MEAL OF THE DAY    insulin (LANTUS SOLOSTAR U-100 INSULIN) 18 Units, Subcutaneous, Daily    ipratropium (ATROVENT) 21 mcg (0.03 %) nasal spray 1 spray, Each Nostril, 4 times daily, As needed for runny nose    JARDIANCE 25 mg, Oral, Daily    lisinopriL (PRINIVIL,ZESTRIL) 5 mg, Oral, Daily    medical supply, miscellaneous (0.2 MICRON FILTER ATTACHMENT MISC)   BD UF short Pen Needle 8MMx 31g, See Instructions, Use daily to inject insulin' Dx Code E11.9 SAMMIE 99 mon, # 100 EA, 11 Refill(s), Pharmacy: [facility] #[id number], 157, cm, Height/Length Dosing, 06/10/21 13:27:00 CDT, 85.4, kg, Weight Dosing, 06/10/21 1...    metoprolol succinate (TOPROL-XL) 25 mg, Oral, Daily    mupirocin (BACTROBAN) 2 % ointment Topical (Top), 3 times daily    nitroGLYCERIN (NITROSTAT) 0.4 mg, Sublingual    pen needle, diabetic 32 gauge x 5/32" Ndle BD yanelis insulin pen needle for once a day injections    pregabalin (LYRICA) 50 MG capsule 1 capsule, Oral, 2 times daily       Subjective:     Review of Systems   Constitutional: Negative.    HENT: Negative.     Eyes: Negative.    Respiratory: Negative.     Cardiovascular: Negative.    Gastrointestinal: Negative.    Endocrine: Negative.    Genitourinary: Negative.    Musculoskeletal:  Positive for arthralgias.   Skin:  Positive for wound.   Allergic/Immunologic: Negative.    Neurological: Negative.    Hematological: Negative.    Psychiatric/Behavioral: Negative.       Objective:     There were no vitals taken for this visit.    Physical Exam  Neurological:      Mental Status: She is alert and oriented to person, place, and time. "   Psychiatric:         Mood and Affect: Mood normal.       Labs Reviewed:     Hematology:  Lab Results   Component Value Date    WBC 8.6 12/30/2022    HGB 13.1 12/30/2022    HCT 41.5 12/30/2022     12/30/2022     Chemistry:  Lab Results   Component Value Date     12/30/2022    K 4.0 12/30/2022    CHLORIDE 106 12/30/2022    BUN 16.5 12/30/2022    CREATININE 1.28 (H) 12/30/2022    EGFRNORACEVR 47 12/30/2022    GLUCOSE 292 (H) 12/30/2022    CALCIUM 10.0 12/30/2022    ALKPHOS 72 12/30/2022    LABPROT 7.2 12/30/2022    ALBUMIN 3.9 12/30/2022    BILIDIR 0.2 12/30/2021    IBILI 0.30 12/30/2021    AST 17 12/30/2022    ALT 13 12/30/2022    MG 1.80 10/16/2022    PHOS 3.3 10/27/2020    [id number] 18.9 (L) 10/27/2020     Lab Results   Component Value Date    HGBA1C 8.6 (H) 12/30/2022     Lipid Panel:  Lab Results   Component Value Date    CHOL 172 12/30/2022    CHOL 159 06/16/2022    HDL 47 12/30/2022    HDL 55 06/16/2022    LDL 83.00 12/30/2022    TRIG 211 (H) 12/30/2022    TRIG 169 (A) 06/16/2022    TOTALCHOLEST 4 12/30/2022     Thyroid:  Lab Results   Component Value Date    TSH 1.416 12/30/2022     Urine:  Lab Results   Component Value Date    COLORUA Colorless (A) 12/30/2022    APPEARANCEUA Clear 12/30/2022    SGUA 1.029 12/30/2022    PHUA 5.5 12/30/2022    PROTEINUA Negative 12/30/2022    GLUCOSEUA 4+ (A) 12/30/2022    KETONESUA Negative 12/30/2022    BLOODUA Negative 12/30/2022    NITRITESUA Negative 12/30/2022    LEUKOCYTESUR Negative 12/30/2022    RBCUA 0-5 12/30/2022    WBCUA 0-5 12/30/2022    BACTERIA None Seen 12/30/2022    SQEPUA Trace (A) 12/30/2022    HYALINECASTS None Seen 12/30/2022    CREATRANDUR 34.8 (L) 12/30/2022    PROTEINURINE 7.8 12/30/2021        Assessment:       ICD-10-CM ICD-9-CM   1. Chronic pain of right knee  M25.561 719.46    G89.29 338.29   2. Neck pain  M54.2 723.1   3. Epistaxis  R04.0 784.7   4. Type 2 diabetes mellitus without complication, with long-term current use of  insulin  E11.9 250.00    Z79.4 V58.67   5. Mixed hyperlipidemia  E78.2 272.2   6. Primary hypertension  I10 401.9   7. Allergic rhinitis, unspecified seasonality, unspecified trigger  J30.9 477.9   8. Class 1 obesity due to excess calories with serious comorbidity and body mass index (BMI) of 32.0 to 32.9 in adult  E66.09 278.00    Z68.32 V85.32        Plan:     1. Chronic pain of right knee  - Ambulatory referral/consult to Orthopedics; Future    2. Neck pain  Improved  Continue flexeril 10 mg Qhs prn     3. Epistaxis  Patient followed by ENT Clinic    4. Type 2 diabetes mellitus without complication, with long-term current use of insulin  Continue Glimepiride 2 mg daily, Jardiance 25 mg daily, Lantus 18 units daily, Trulicity 1.5 mg weekly  Encouraged home CBG monitoring.  Hypoglycemic episodes: denies  BMI: 32.8  Hemoglobin A1c   Date Value Ref Range Status   12/30/2022 8.6 (H) <=7.0 % Final     Comment:     Please send orders to [facility]     Urine Creatinine   Date Value Ref Range Status   12/30/2022 34.8 (L) 47.0 - 110.0 mg/dL Final     Comment:     Please send orders to [facility]   Urine Microalbumin: 5.6  On Atorvastatin  Weight Loss Encouraged  ADA Diet  - CBC Auto Differential; Future  - Comprehensive Metabolic Panel; Future  - Urinalysis; Future  - Microalbumin/Creatinine Ratio, Urine; Future  - Urinalysis    5. Mixed hyperlipidemia  Continue Atorvastatin 10 mg daily  Weight loss encouraged  Low fat/high fiber diet  Increase physical activity  Cholesterol Total   Date Value Ref Range Status   12/30/2022 172 <=200 mg/dL Final     Comment:     Please send orders to [facility]     HDL Cholesterol   Date Value Ref Range Status   12/30/2022 47 35 - 60 mg/dL Final     Comment:     Please send orders to [facility]     Triglyceride   Date Value Ref Range Status   12/30/2022 211 (H) 37 - 140 mg/dL Final     Comment:     Please send orders to  [facility]     LDL Cholesterol   Date Value Ref Range Status   12/30/2022 83.00 50.00 - 140.00 mg/dL Final   - Lipid Panel; Future    6. Primary hypertension  B/P: deferred due to audio visit  Urine Creatinine   Date Value Ref Range Status   12/30/2022 34.8 (L) 47.0 - 110.0 mg/dL Final     Comment:     Please send orders to [facility]   Urine Microalbumin: 5.6  Results for orders placed or performed during the hospital encounter of 10/16/22   EKG 12-lead    Collection Time: 10/16/22  4:21 PM    Narrative    Test Reason : R53.1,    Vent. Rate : 068 BPM     Atrial Rate : 068 BPM     P-R Int : 138 ms          QRS Dur : 076 ms      QT Int : 386 ms       P-R-T Axes : 033 016 021 degrees     QTc Int : 410 ms    Normal sinus rhythm with sinus arrhythmia  Minimal voltage criteria for LVH, may be normal variant  Borderline Abnormal ECG  No previous ECGs available  Confirmed by Marshall Lockett MD ([id number]) on 10/17/2022 8:27:56 AM    Referred By: [email or web address]   SELF           Confirmed By:Marshall Lockett MD   Continue Lisinopril 5 mg daily, Metoprolol Succ 25 mg daily  DASH diet  Encouraged home blood pressure monitoring    7. Allergic rhinitis, unspecified seasonality, unspecified trigger  Patient followed by ENT Clinic    8. Class 1 obesity due to excess calories with serious comorbidity and body mass index (BMI) of 32.0 to 32.9 in adult  BMI: 32.8  Thyroid Stimulating Hormone   Date Value Ref Range Status   12/30/2022 1.416 0.350 - 4.940 uIU/mL Final     Comment:     Please send orders to [facility]     Vit D 25 OH   Date Value Ref Range Status   10/27/2020 18.9 (L) 30.0 - 80.0 ng/mL Final     Hemoglobin A1c   Date Value Ref Range Status   12/30/2022 8.6 (H) <=7.0 % Final     Comment:     Please send orders to [facility]   Sleep Study: none noted  Weight Loss Encouraged  Increase Physical Activity      Follow up in about 4 months (around 6/9/2023) for Labs. In  addition to their scheduled follow up, the patient has also been instructed to follow up on as needed basis.     CHANDNI LuisP

## 2023-02-22 ENCOUNTER — OFFICE VISIT (OUTPATIENT)
Dept: OTOLARYNGOLOGY | Facility: CLINIC | Age: 63
End: 2023-02-22
Payer: MEDICAID

## 2023-02-22 VITALS — DIASTOLIC BLOOD PRESSURE: 68 MMHG | SYSTOLIC BLOOD PRESSURE: 104 MMHG | HEART RATE: 71 BPM | TEMPERATURE: 98 F

## 2023-02-22 DIAGNOSIS — H90.3 SENSORINEURAL HEARING LOSS (SNHL) OF BOTH EARS: ICD-10-CM

## 2023-02-22 DIAGNOSIS — J31.0 CHRONIC RHINITIS: ICD-10-CM

## 2023-02-22 DIAGNOSIS — R09.81 NASAL CONGESTION: ICD-10-CM

## 2023-02-22 DIAGNOSIS — R42 DIZZINESS: Primary | ICD-10-CM

## 2023-02-22 PROCEDURE — 1159F MED LIST DOCD IN RCRD: CPT | Mod: CPTII,,, | Performed by: NURSE PRACTITIONER

## 2023-02-22 PROCEDURE — 3078F DIAST BP <80 MM HG: CPT | Mod: CPTII,,, | Performed by: NURSE PRACTITIONER

## 2023-02-22 PROCEDURE — 3074F PR MOST RECENT SYSTOLIC BLOOD PRESSURE < 130 MM HG: ICD-10-PCS | Mod: CPTII,,, | Performed by: NURSE PRACTITIONER

## 2023-02-22 PROCEDURE — 4010F ACE/ARB THERAPY RXD/TAKEN: CPT | Mod: CPTII,,, | Performed by: NURSE PRACTITIONER

## 2023-02-22 PROCEDURE — 3074F SYST BP LT 130 MM HG: CPT | Mod: CPTII,,, | Performed by: NURSE PRACTITIONER

## 2023-02-22 PROCEDURE — 1159F PR MEDICATION LIST DOCUMENTED IN MEDICAL RECORD: ICD-10-PCS | Mod: CPTII,,, | Performed by: NURSE PRACTITIONER

## 2023-02-22 PROCEDURE — 99213 PR OFFICE/OUTPT VISIT, EST, LEVL III, 20-29 MIN: ICD-10-PCS | Mod: S$PBB,,, | Performed by: NURSE PRACTITIONER

## 2023-02-22 PROCEDURE — 3078F PR MOST RECENT DIASTOLIC BLOOD PRESSURE < 80 MM HG: ICD-10-PCS | Mod: CPTII,,, | Performed by: NURSE PRACTITIONER

## 2023-02-22 PROCEDURE — 4010F PR ACE/ARB THEARPY RXD/TAKEN: ICD-10-PCS | Mod: CPTII,,, | Performed by: NURSE PRACTITIONER

## 2023-02-22 PROCEDURE — 99214 OFFICE O/P EST MOD 30 MIN: CPT | Mod: PBBFAC | Performed by: NURSE PRACTITIONER

## 2023-02-22 PROCEDURE — 99213 OFFICE O/P EST LOW 20 MIN: CPT | Mod: S$PBB,,, | Performed by: NURSE PRACTITIONER

## 2023-02-22 RX ORDER — FLUTICASONE PROPIONATE 50 MCG
2 SPRAY, SUSPENSION (ML) NASAL 2 TIMES DAILY
Qty: 16 G | Refills: 11 | Status: SHIPPED | OUTPATIENT
Start: 2023-02-22 | End: 2023-03-24

## 2023-02-22 NOTE — PROGRESS NOTES
"[facility]  Otolaryngology Clinic Note    Pedro Vergara  YOB: 1960    Chief Complaint: f/u after audio    HPI: 10/27/21: 60yoF referred for epistaxis. She reports nosebleeds are always from right nostril and occur 3-4x/week that resolve spontaneously with pressure. She also occasionally has bleeds during the night and will wake up with blood on her clothes and bedding. She takes baby asa daily. She endorses occasional nasal congestion and frequent clear rhinorrhea. C/o frequent perioral fever blister outbreaks recently. [1]    11/18/21: 60yoF here for f/u of epistaxis. Also c/o recent rhinorrhea and headache. Still reporting nosebleeds but frequency has decreased to 2x/week. Still reporting bleeds during the night and will wake up with blood on her clothes and bedding. She also endorses occasional nasal congestion and frequent clear rhinorrhea with associated headache. Denies congestion today, dry quality to nasal cavity, rhinotillexomania, or using the heater accessibly. She is using saline gel spray as directed at the previous visit, but did not take Allegra. She takes baby asa daily and endorses bruising.    01/06/2023: 62yoF referred for dizziness. She reports this began acutely in November. It occurs multiple times daily and lasts 2 min or less. She describes this as room spinning and feeling as if she is on a merry go round. Denies prescyncope. She has to hold on to the wall to make it to the bathroom. States this occurs when she changes position such as rising as well as turning over in the bed. [place] [facility] [place] which helps but does not completely resolve symptoms. She also c/o rhinitis, nasal congestion, aural pressure, & ear popping. States "I feel like a little kid with a snotty nose all the time." Denies sneezing or itchy/watery eyes. Denies HL or tinnitus. Denies any previous otologic hx, procedures, or surgeries. Denies medication " changes apart from meclizine. She uses zyrtec daily and astelin BID with minimal effective. Denies head trauma.     02/22/2023: Reports dizziness has resolved following Epley. Continues to have bothersome rhinitis, though it has improved with zyrtec & atrovent. She feels that constantly wiping her nose is causing fever blister outbreaks under her nose. Occasional popping & discomfort to right ear. She is having carpet removed from her home today.     ROS:   10-point review of systems negative except per HPI      Review of patient's allergies indicates:   Allergen Reactions    Adhesive tape-silicones        Past Medical History:   Diagnosis Date    Anxiety     CKD (chronic kidney disease)     Depression     Diabetes mellitus, type 2     HLD (hyperlipidemia)     Hypertension     Obesity, unspecified     Unspecified glaucoma        Past Surgical History:   Procedure Laterality Date    CARPAL TUNNEL RELEASE      CARPAL TUNNEL RELEASE Right 07/06/2016    COLONOSCOPY  08/22/2014    HYSTERECTOMY      tendon sheath incision      TONSILLECTOMY         Social History     Socioeconomic History    Marital status: Single   Tobacco Use    Smoking status: Never    Smokeless tobacco: Never   Substance and Sexual Activity    Alcohol use: Not Currently    Drug use: Not Currently    Sexual activity: Not Currently     Social Determinants of Health     Physical Activity: Inactive    Days of Exercise per Week: 0 days    Minutes of Exercise per Session: 0 min       Family History   Problem Relation Age of Onset    Cancer Mother     Cancer Father     Hypertension Father     Diabetes Paternal Grandmother        Outpatient Encounter Medications as of 2/22/2023   Medication Sig Dispense Refill    ACCU-CHEK GUIDE TEST STRIPS Strp CHECK GLUCOSE TWICE A DAY      acyclovir 5% (ZOVIRAX) 5 % ointment APPLY TO AFFECTED AREA EVERY 4 HOURS      atorvastatin (LIPITOR) 10 MG tablet Take 1 tablet (10 mg total) by mouth once daily. 90 tablet 2    BD NORMA  "2ND GEN PEN NEEDLE 32 gauge x 5/32" Ndle USE DAILY TO INJECT INSULIN 100 each 11    BD ULTRA-FINE SHORT PEN NEEDLE 31 gauge x 5/16" Ndle USE DAILY AS DIRECTED      blood sugar diagnostic (ACCU-CHEK GUIDE TEST STRIPS MISC)   ACCU-CHEK GUIDE TEST STRIP, See Instructions, USE ONE STRIP TO TEST BLOOD GLUCOSE TWICE DAILY, # 50 unknown unit, 3 Refill(s), Pharmacy: [facility] STORE [id number], 158, cm, Height/Length Dosing, 11/18/21 13:08:00 CST, 85.9, kg, Weight Dosing, 11/18/21 13:08:00 CST      cetirizine (ZYRTEC) 10 MG tablet Take 1 tablet (10 mg total) by mouth once daily. 90 tablet 2    cyclobenzaprine (FLEXERIL) 10 MG tablet Take 1 tablet (10 mg total) by mouth nightly as needed for Muscle spasms. 30 tablet 3    diclofenac sodium (VOLTAREN) 1 % Gel APPLY 2 GRAMS TO AFFECTED AREA 3 TIMES A DAY      dulaglutide (TRULICITY) 1.5 mg/0.5 mL pen injector INJECT 1.5 MG SUBCUTANEOUSLY EVERY WEEK 4 pen 11    DULoxetine (CYMBALTA) 60 MG capsule Take 60 mg by mouth nightly.      famotidine (PEPCID) 40 MG tablet Take 1 tablet (40 mg total) by mouth once daily. 30 tablet 3    glimepiride (AMARYL) 2 MG tablet TAKE 1 TABLET BY MOUTH EVERY DAY WITH THE FIRST MEAL OF THE DAY 90 tablet 3    insulin (LANTUS SOLOSTAR U-100 INSULIN) glargine 100 units/mL SubQ pen Inject 18 Units into the skin once daily. 15 each 6    ipratropium (ATROVENT) 21 mcg (0.03 %) nasal spray 1 spray by Each Nostril route 4 (four) times daily. As needed for runny nose 30 mL 5    JARDIANCE 25 mg tablet Take 25 mg by mouth once daily.      lisinopriL (PRINIVIL,ZESTRIL) 5 MG tablet Take 1 tablet (5 mg total) by mouth once daily. 90 tablet 2    medical supply, miscellaneous (0.2 MICRON FILTER ATTACHMENT MISC)   BD UF short Pen Needle 8MMx 31g, See Instructions, Use daily to inject insulin' Dx Code E11.9 SAMMIE 99 mon, # 100 EA, 11 Refill(s), Pharmacy: [facility]/pharmacy #[id number], 157, cm, Height/Length Dosing, 06/10/21 13:27:00 CDT, 85.4, kg, Weight Dosing, 06/10/21 1...      metoprolol " "succinate (TOPROL-XL) 25 MG 24 hr tablet Take 1 tablet (25 mg total) by mouth once daily. 90 tablet 2    mupirocin (BACTROBAN) 2 % ointment Apply topically 3 (three) times daily. 22 g 1    nitroGLYCERIN (NITROSTAT) 0.4 MG SL tablet Place 0.4 mg under the tongue.      pen needle, diabetic 32 gauge x 5/32" Ndle BD yanelis insulin pen needle for once a day injections 90 each 3    pregabalin (LYRICA) 50 MG capsule Take 1 capsule by mouth 2 (two) times a day.      [DISCONTINUED] cyclobenzaprine (FLEXERIL) 10 MG tablet Take 1 tablet (10 mg total) by mouth nightly as needed for Muscle spasms. 10 tablet 2    [DISCONTINUED] meclizine (ANTIVERT) 25 mg tablet Take 2 tablets (50 mg total) by mouth 2 (two) times daily as needed for Dizziness. 60 tablet 2     No facility-administered encounter medications on file as of 2/22/2023.       Physical Exam:  Vitals:    02/22/23 1049   BP: 104/68   BP Location: Right arm   Patient Position: Sitting   BP Method: Large (Automatic)   Pulse: 71   Temp: 98 °F (36.7 °C)   TempSrc: Oral       General: NAD, voice normal  Neuro: AAO, CN II - XII grossly intact  Head/ Face: NCAT, symmetric, sensations intact bilaterally. No TMJ TTP, however there is b/l crepitus.  Eyes: EOMI, PERRL  Ears: externally normal with grossly normal hearing  AD: EAC patent, TM intact, no middle ear effusion, no retractions  AS: EAC patent, TM intact, no middle ear effusion, no retractions  Nose: bilateral nares patent, midline septum, clear rhinorrhea, pale/boggy mucosa, no external deformity, +ITH  OC/OP: MMM, no intraoral lesions, FOM/BOT soft, no trismus, dentition is moderate, no uvular deviation, bilaterally symmetric soft palate elevation, palatoglossus and palatopharyngeal fold wnl; tonsils are symmetric and 1+  Indirect laryngoscopy: deferred due to patient intolerance  Neck: soft, supple, no LAD, normal ROM, no thyromegaly  Respiratory: nonlabored, no wheezing, bilateral chest rise  Cardiovascular: " RRR  Gastrointestinal: S NT ND  Skin: warm, no lesions  Musculoskeletal: 5/5 strength  Psych: Appropriate affect/mood     Pertinent Data:  ? LABS:  ? AUDIO:                 Imaging:   I personally reviewed the following images:        Assessment/Plan:  62 y.o. female referred for vertiginous dizziness, chronic rhinitis. Audio with b/l HFSNHL- reviewed.   - Annual audio  - Continue zyrtec  - Add flonase BID  - Continue Atrovent nasal spray 3-4x/day prn  - RTC 6-8 weeks. Will consider RAST if no significant improvement    Mable Alcocer NP

## 2023-03-06 ENCOUNTER — DOCUMENTATION ONLY (OUTPATIENT)
Dept: INTERNAL MEDICINE | Facility: CLINIC | Age: 63
End: 2023-03-06
Payer: MEDICAID

## 2023-03-27 ENCOUNTER — HOSPITAL ENCOUNTER (OUTPATIENT)
Dept: RADIOLOGY | Facility: HOSPITAL | Age: 63
Discharge: HOME | End: 2023-03-27
Attending: STUDENT IN AN ORGANIZED HEALTH CARE EDUCATION/TRAINING PROGRAM
Payer: MEDICAID

## 2023-03-27 ENCOUNTER — OFFICE VISIT (OUTPATIENT)
Dept: ORTHOPEDICS | Facility: CLINIC | Age: 63
End: 2023-03-27
Payer: MEDICAID

## 2023-03-27 VITALS
WEIGHT: 180 LBS | RESPIRATION RATE: 19 BRPM | HEIGHT: 62 IN | OXYGEN SATURATION: 98 % | DIASTOLIC BLOOD PRESSURE: 74 MMHG | HEART RATE: 75 BPM | BODY MASS INDEX: 33.13 KG/M2 | SYSTOLIC BLOOD PRESSURE: 114 MMHG

## 2023-03-27 DIAGNOSIS — N18.30 STAGE 3 CHRONIC KIDNEY DISEASE, UNSPECIFIED WHETHER STAGE 3A OR 3B CKD: Chronic | ICD-10-CM

## 2023-03-27 DIAGNOSIS — Z79.4 TYPE 2 DIABETES MELLITUS WITHOUT COMPLICATION, WITH LONG-TERM CURRENT USE OF INSULIN: Chronic | ICD-10-CM

## 2023-03-27 DIAGNOSIS — M25.561 CHRONIC PAIN OF RIGHT KNEE: ICD-10-CM

## 2023-03-27 DIAGNOSIS — G89.29 CHRONIC PAIN OF RIGHT KNEE: ICD-10-CM

## 2023-03-27 DIAGNOSIS — E11.9 TYPE 2 DIABETES MELLITUS WITHOUT COMPLICATION, WITH LONG-TERM CURRENT USE OF INSULIN: Chronic | ICD-10-CM

## 2023-03-27 DIAGNOSIS — M17.11 PRIMARY OSTEOARTHRITIS OF RIGHT KNEE: Primary | ICD-10-CM

## 2023-03-27 DIAGNOSIS — E66.9 OBESITY (BMI 30-39.9): ICD-10-CM

## 2023-03-27 PROCEDURE — 73564 X-RAY EXAM KNEE 4 OR MORE: CPT | Mod: TC,RT

## 2023-03-27 PROCEDURE — 99215 OFFICE O/P EST HI 40 MIN: CPT | Mod: PBBFAC

## 2023-03-27 NOTE — PROGRESS NOTES
"Subjective:    Patient ID: Pedro Vergara is a 62 y.o. female  who presented to [facility] for consult requested by JAROD Luis for knee pain.     Chief Complaint: Pain of the Right Knee    History of Present Illness:  Pedro Vergara who has a history of R knee AO  presented today with with knee pain involving the right knee for the past 3 months. Pain is located in the anterior knee joint. Quality of pain is described as Sharp.  Inciting event:  pt has a hx of vertigo and reports falling twice within the past 3 months and 3 days after the last fall, she started noticing pain in her R knee.  . Pain is aggravated by any weight bearing and walking.  Patient has had prior knee problems. Evaluation to date: plain films, PCP evaluation, and Ortho evaluation. Treatment to date: topical analgesics, oral analgesics, VSI, and ice/heat. Expectations for today's visit includes pain relief.  Occupation includes retire. PCP is JAROD Luis.    Knee Review of Systems:  Swelling?  yes  Instability?  no  Mechanical sx?  no  <30 min AM stiffness? no  Limited ROM? no  Fever/Chills? no    Current Choice of Exercise:  none    ROS  See HPI     Objective:      Physical Exam:    /74 (BP Location: Left arm, Patient Position: Sitting)   Pulse 75   Resp 19   Ht 5' 2" (1.575 m)   Wt 81.6 kg (180 lb)   SpO2 98%   BMI 32.92 kg/m²     Ortho/SPM Exam    Appearance:  Normal gait/station  WBAT  Alignment: Left: normal Right: normal   Soft tissue swelling: Left: no Right: yes  Effusion: Left:  Negative Right: Positive  Erythema: Left no Right: no  Ecchymosis: Left: no Right: no  Atrophy: Left: no Right: yes    Palpation:  Knee Tenderness: Left: None Right: anterior knee joint    Range of motion:  Flexion (140): Left:  135 Right: 130  Extension (0): Left: 0 Right: 0    Strength:  Extension: Left 5/5  Pain: no     Right 4/5 Pain: yes  Flexion: Left 5/5 Pain: no Right   " 4/5 Pain: yes    Special Tests:  Ballotable Effusion:Left: Negative Right: Negative   Fluid Wave: Left: Negative Right: Negative   Crepitus: Left: Negative Right: Positive   Patellar grind test: Left: Negative  Right: Negative  Apprehension test: Left: Negative Right: Negative   Varus: @ 0, Left Negative Right: Negative.  @ 30, Left Negative  Right Negative   Valgus: @ 0, Left Negative Right: Negative.  @ 30, Left Negative  Right Negative  Lachman: Left: Negative Right: Negative   Ant Drawer: Left: Negative Right: Negative   Posterior Drawer: Left: Negative Right: Negative   Dial Test: Left: Not performed Right: Not performed   Kristopher: Left: Negative Right: Negative   Apley's: Left: Not performed Right: Not performed  Thessaly's: Left: Not performed Right: Not performed   Noble Compression: Left: Not performed Right: Not performed   Tara: Left: Not performed Right: Not performed     General appearance: NAD  Peripheral pulses: normal bilaterally   Reflexes: Left: normal Right normal   Sensation: normal    Labs:  Last A1c: 8.6     Imaging:   Previous images reviewed.  X-rays ordered and performed today: yes  # of views: 4 Laterality: right  My Interpretation:  no fracture, dislocation. Medial joint space narrowing with subchondral sclerosis and osteophytes. Effusion also noted  Assessment:        Encounter Diagnoses   Code Name Primary?    M17.11 Primary osteoarthritis of right knee Yes    E66.9 Obesity (BMI 30-39.9)     E11.9, Z79.4 Type 2 diabetes mellitus without complication, with long-term current use of insulin     Comment: Chronic    N18.30 Stage 3 chronic kidney disease, unspecified whether stage 3a or 3b CKD     Comment: Chronic          Plan:         Dx: right knee osteoarthritis - New problem.   Treatment Plan: Discussed with patient diagnosis, prognosis, and treatment recommendations. Education provided.  Conservative treatment plan discussed.  Imaging: prior radiological studies independently reviewed;  discussed with patient; agree with radiologist interpretation.   Weight Management: is paramount. recommend at least 10% of total body weight loss if your bmi is 30-34.9. A BMI 24.9 or less may provide further relief..   Procedure: Discussed CSI/VSI as treatment options; She request VSI. She has tried over the counter tylenol and Voltaren so far, but  has not yet had a trial of HEP/PT.  Submit for PA for VS.   Activity: Activity as tolerated; HEP to include aerobic conditioning and strength training with non-painful activity. ROM/STG exercises. Proper footware; assistive devises to avoid limping.   Therapy: HEP/Physical Therapy  Medication: CONTINUE Voltaren gel, recommended tylenol 1000mg TID x 3 day. Avoid NSAIDs due to CKD.  RTC: PRN; call if any issues.

## 2023-04-14 NOTE — PROGRESS NOTES
"[facility]  Otolaryngology Clinic Note    Pedro Vergara  YOB: 1960    Chief Complaint: f/u after audio    HPI: 10/27/21: 60yoF referred for epistaxis. She reports nosebleeds are always from right nostril and occur 3-4x/week that resolve spontaneously with pressure. She also occasionally has bleeds during the night and will wake up with blood on her clothes and bedding. She takes baby asa daily. She endorses occasional nasal congestion and frequent clear rhinorrhea. C/o frequent perioral fever blister outbreaks recently.     11/18/21: 60yoF here for f/u of epistaxis. Also c/o recent rhinorrhea and headache. Still reporting nosebleeds but frequency has decreased to 2x/week. Still reporting bleeds during the night and will wake up with blood on her clothes and bedding. She also endorses occasional nasal congestion and frequent clear rhinorrhea with associated headache. Denies congestion today, dry quality to nasal cavity, rhinotillexomania, or using the heater accessibly. She is using saline gel spray as directed at the previous visit, but did not take Allegra. She takes baby asa daily and endorses bruising.    01/06/2023: 62yoF referred for dizziness. She reports this began acutely in November. It occurs multiple times daily and lasts 2 min or less. She describes this as room spinning and feeling as if she is on a merry go round. Denies prescyncope. She has to hold on to the wall to make it to the bathroom. States this occurs when she changes position such as rising as well as turning over in the bed. She has been to [facility] [place] and received meclizine which helps but does not completely resolve symptoms. She also c/o rhinitis, nasal congestion, aural pressure, & ear popping. States "I feel like a little kid with a snotty nose all the time." Denies sneezing or itchy/watery eyes. Denies HL or tinnitus. Denies any previous otologic hx, procedures, or surgeries. Denies medication changes " apart from meclizine. She uses zyrtec daily and astelin BID with minimal effective. Denies head trauma.     02/22/2023: Reports dizziness has resolved following Epley. Continues to have bothersome rhinitis, though it has improved with zyrtec & atrovent. She feels that constantly wiping her nose is causing fever blister outbreaks under her nose. Occasional popping & discomfort to right ear. She is having carpet removed from her home today.     4/17/23: Reports rhinitis has improved, however, it is still bothersome. Blowing her nose frequently is causing her to have fever blisters under her nose. She was unsure if she could use tissues with aloe. She is using the atrovent QID. Having carpet removed also helped. C/o some continued discomfort and popping to right ear.     ROS:   10-point review of systems negative except per HPI      Review of patient's allergies indicates:   Allergen Reactions    Adhesive tape-silicones        Past Medical History:   Diagnosis Date    Anxiety     CKD (chronic kidney disease)     Depression     Diabetes mellitus, type 2     HLD (hyperlipidemia)     Hypertension     Obesity, unspecified     Unspecified glaucoma        Past Surgical History:   Procedure Laterality Date    CARPAL TUNNEL RELEASE      CARPAL TUNNEL RELEASE Right 07/06/2016    COLONOSCOPY  08/22/2014    HYSTERECTOMY      tendon sheath incision      TONSILLECTOMY         Social History     Socioeconomic History    Marital status: Single   Tobacco Use    Smoking status: Never    Smokeless tobacco: Never   Substance and Sexual Activity    Alcohol use: Not Currently    Drug use: Not Currently    Sexual activity: Not Currently     Social Determinants of Health     Physical Activity: Inactive    Days of Exercise per Week: 0 days    Minutes of Exercise per Session: 0 min       Family History   Problem Relation Age of Onset    Cancer Mother     Cancer Father     Hypertension Father     Diabetes Paternal Grandmother        Outpatient  "Encounter Medications as of 2/22/2023   Medication Sig Dispense Refill    ACCU-CHEK GUIDE TEST STRIPS Strp CHECK GLUCOSE TWICE A DAY      acyclovir 5% (ZOVIRAX) 5 % ointment APPLY TO AFFECTED AREA EVERY 4 HOURS      atorvastatin (LIPITOR) 10 MG tablet Take 1 tablet (10 mg total) by mouth once daily. 90 tablet 2    BD NORMA 2ND GEN PEN NEEDLE 32 gauge x 5/32" Ndle USE DAILY TO INJECT INSULIN 100 each 11    BD ULTRA-FINE SHORT PEN NEEDLE 31 gauge x 5/16" Ndle USE DAILY AS DIRECTED      blood sugar diagnostic (ACCU-CHEK GUIDE TEST STRIPS MISC)   ACCU-CHEK GUIDE TEST STRIP, See Instructions, USE ONE STRIP TO TEST BLOOD GLUCOSE TWICE DAILY, # 50 unknown unit, 3 Refill(s), Pharmacy: [facility] STORE [id number], 158, cm, Height/Length Dosing, 11/18/21 13:08:00 CST, 85.9, kg, Weight Dosing, 11/18/21 13:08:00 CST      cetirizine (ZYRTEC) 10 MG tablet Take 1 tablet (10 mg total) by mouth once daily. 90 tablet 2    cyclobenzaprine (FLEXERIL) 10 MG tablet Take 1 tablet (10 mg total) by mouth nightly as needed for Muscle spasms. 30 tablet 3    diclofenac sodium (VOLTAREN) 1 % Gel APPLY 2 GRAMS TO AFFECTED AREA 3 TIMES A DAY      dulaglutide (TRULICITY) 1.5 mg/0.5 mL pen injector INJECT 1.5 MG SUBCUTANEOUSLY EVERY WEEK 4 pen 11    DULoxetine (CYMBALTA) 60 MG capsule Take 60 mg by mouth nightly.      famotidine (PEPCID) 40 MG tablet Take 1 tablet (40 mg total) by mouth once daily. 30 tablet 3    glimepiride (AMARYL) 2 MG tablet TAKE 1 TABLET BY MOUTH EVERY DAY WITH THE FIRST MEAL OF THE DAY 90 tablet 3    insulin (LANTUS SOLOSTAR U-100 INSULIN) glargine 100 units/mL SubQ pen Inject 18 Units into the skin once daily. 15 each 6    ipratropium (ATROVENT) 21 mcg (0.03 %) nasal spray 1 spray by Each Nostril route 4 (four) times daily. As needed for runny nose 30 mL 5    JARDIANCE 25 mg tablet Take 25 mg by mouth once daily.      lisinopriL (PRINIVIL,ZESTRIL) 5 MG tablet Take 1 tablet (5 mg total) by mouth once daily. 90 tablet 2    medical supply, " "miscellaneous (0.2 MICRON FILTER ATTACHMENT MISC)   BD UF short Pen Needle 8MMx 31g, See Instructions, Use daily to inject insulin' Dx Code E11.9 SAMMIE 99 mon, # 100 EA, 11 Refill(s), Pharmacy: [facility]/pharmacy #[id number], 157, cm, Height/Length Dosing, 06/10/21 13:27:00 CDT, 85.4, kg, Weight Dosing, 06/10/21 1...      metoprolol succinate (TOPROL-XL) 25 MG 24 hr tablet Take 1 tablet (25 mg total) by mouth once daily. 90 tablet 2    mupirocin (BACTROBAN) 2 % ointment Apply topically 3 (three) times daily. 22 g 1    nitroGLYCERIN (NITROSTAT) 0.4 MG SL tablet Place 0.4 mg under the tongue.      pen needle, diabetic 32 gauge x 5/32" Ndle BD yanelis insulin pen needle for once a day injections 90 each 3    pregabalin (LYRICA) 50 MG capsule Take 1 capsule by mouth 2 (two) times a day.      [DISCONTINUED] cyclobenzaprine (FLEXERIL) 10 MG tablet Take 1 tablet (10 mg total) by mouth nightly as needed for Muscle spasms. 10 tablet 2    [DISCONTINUED] meclizine (ANTIVERT) 25 mg tablet Take 2 tablets (50 mg total) by mouth 2 (two) times daily as needed for Dizziness. 60 tablet 2     No facility-administered encounter medications on file as of 2/22/2023.       Physical Exam:  Vitals:    02/22/23 1049   BP: 104/68   BP Location: Right arm   Patient Position: Sitting   BP Method: Large (Automatic)   Pulse: 71   Temp: 98 °F (36.7 °C)   TempSrc: Oral       General: NAD, voice normal  Neuro: AAO, CN II - XII grossly intact  Head/ Face: NCAT, symmetric, sensations intact bilaterally. No TMJ TTP, however there is b/l crepitus.  Eyes: EOMI, PERRL  Ears: externally normal with grossly normal hearing  AD: EAC patent, TM intact, no middle ear effusion, no retractions  AS: EAC patent, TM intact, no middle ear effusion, no retractions  Nose: bilateral nares patent, midline septum, clear rhinorrhea, pale/boggy mucosa, no external deformity, +ITH  OC/OP: MMM, no intraoral lesions, FOM/BOT soft, no trismus, dentition is moderate, no uvular deviation, " bilaterally symmetric soft palate elevation, palatoglossus and palatopharyngeal fold wnl; tonsils are symmetric and 1+  Indirect laryngoscopy: deferred due to patient intolerance  Neck: soft, supple, no LAD, normal ROM, no thyromegaly  Respiratory: nonlabored, no wheezing, bilateral chest rise  Cardiovascular: RRR  Gastrointestinal: S NT ND  Skin: warm, no lesions  Musculoskeletal: 5/5 strength  Psych: Appropriate affect/mood     Pertinent Data:  ? LABS:  ? AUDIO:                 Imaging:   I personally reviewed the following images:        Assessment/Plan:  62 y.o. female referred for vertiginous dizziness, chronic rhinitis. Audio with b/l HFSNHL- reviewed.   - Annual audio  - Continue zyrtec Qam  - Add claritin Qpm  - Continue flonase BID  - Continue Atrovent nasal spray 3-4x/day prn  - RAST  - Telemed 2-3 weeks to review ADONIS Alcocer NP

## 2023-04-17 ENCOUNTER — OFFICE VISIT (OUTPATIENT)
Dept: OTOLARYNGOLOGY | Facility: CLINIC | Age: 63
End: 2023-04-17
Payer: MEDICAID

## 2023-04-17 VITALS — TEMPERATURE: 99 F | HEART RATE: 63 BPM | DIASTOLIC BLOOD PRESSURE: 61 MMHG | SYSTOLIC BLOOD PRESSURE: 102 MMHG

## 2023-04-17 DIAGNOSIS — H90.3 SENSORINEURAL HEARING LOSS (SNHL) OF BOTH EARS: ICD-10-CM

## 2023-04-17 DIAGNOSIS — J31.0 CHRONIC RHINITIS: ICD-10-CM

## 2023-04-17 DIAGNOSIS — J30.9 ALLERGIC RHINITIS, UNSPECIFIED SEASONALITY, UNSPECIFIED TRIGGER: Primary | ICD-10-CM

## 2023-04-17 PROCEDURE — 3078F PR MOST RECENT DIASTOLIC BLOOD PRESSURE < 80 MM HG: ICD-10-PCS | Mod: CPTII,,, | Performed by: NURSE PRACTITIONER

## 2023-04-17 PROCEDURE — 1159F MED LIST DOCD IN RCRD: CPT | Mod: CPTII,,, | Performed by: NURSE PRACTITIONER

## 2023-04-17 PROCEDURE — 99213 OFFICE O/P EST LOW 20 MIN: CPT | Mod: S$PBB,,, | Performed by: NURSE PRACTITIONER

## 2023-04-17 PROCEDURE — 3074F SYST BP LT 130 MM HG: CPT | Mod: CPTII,,, | Performed by: NURSE PRACTITIONER

## 2023-04-17 PROCEDURE — 3074F PR MOST RECENT SYSTOLIC BLOOD PRESSURE < 130 MM HG: ICD-10-PCS | Mod: CPTII,,, | Performed by: NURSE PRACTITIONER

## 2023-04-17 PROCEDURE — 3078F DIAST BP <80 MM HG: CPT | Mod: CPTII,,, | Performed by: NURSE PRACTITIONER

## 2023-04-17 PROCEDURE — 99214 OFFICE O/P EST MOD 30 MIN: CPT | Mod: PBBFAC | Performed by: NURSE PRACTITIONER

## 2023-04-17 PROCEDURE — 99213 PR OFFICE/OUTPT VISIT, EST, LEVL III, 20-29 MIN: ICD-10-PCS | Mod: S$PBB,,, | Performed by: NURSE PRACTITIONER

## 2023-04-17 PROCEDURE — 4010F ACE/ARB THERAPY RXD/TAKEN: CPT | Mod: CPTII,,, | Performed by: NURSE PRACTITIONER

## 2023-04-17 PROCEDURE — 1159F PR MEDICATION LIST DOCUMENTED IN MEDICAL RECORD: ICD-10-PCS | Mod: CPTII,,, | Performed by: NURSE PRACTITIONER

## 2023-04-17 PROCEDURE — 4010F PR ACE/ARB THEARPY RXD/TAKEN: ICD-10-PCS | Mod: CPTII,,, | Performed by: NURSE PRACTITIONER

## 2023-04-17 RX ORDER — LORATADINE 10 MG/1
10 TABLET ORAL DAILY
Qty: 30 TABLET | Refills: 11 | Status: SHIPPED | OUTPATIENT
Start: 2023-04-17 | End: 2024-04-16

## 2023-05-08 ENCOUNTER — OFFICE VISIT (OUTPATIENT)
Dept: OTOLARYNGOLOGY | Facility: CLINIC | Age: 63
End: 2023-05-08
Payer: MEDICAID

## 2023-05-08 DIAGNOSIS — M26.629 ARTHRALGIA OF TEMPOROMANDIBULAR JOINT, UNSPECIFIED LATERALITY: ICD-10-CM

## 2023-05-08 DIAGNOSIS — J30.9 ALLERGIC RHINITIS, UNSPECIFIED SEASONALITY, UNSPECIFIED TRIGGER: Primary | ICD-10-CM

## 2023-05-08 DIAGNOSIS — H90.3 SENSORINEURAL HEARING LOSS (SNHL) OF BOTH EARS: ICD-10-CM

## 2023-05-08 DIAGNOSIS — M79.18 MYOFASCIAL PAIN: ICD-10-CM

## 2023-05-08 DIAGNOSIS — R42 DIZZINESS: ICD-10-CM

## 2023-05-08 DIAGNOSIS — J31.0 CHRONIC RHINITIS: ICD-10-CM

## 2023-05-08 PROCEDURE — 99213 OFFICE O/P EST LOW 20 MIN: CPT | Mod: 95,,, | Performed by: NURSE PRACTITIONER

## 2023-05-08 PROCEDURE — 99213 PR OFFICE/OUTPT VISIT, EST, LEVL III, 20-29 MIN: ICD-10-PCS | Mod: 95,,, | Performed by: NURSE PRACTITIONER

## 2023-05-08 PROCEDURE — 1159F MED LIST DOCD IN RCRD: CPT | Mod: CPTII,95,, | Performed by: NURSE PRACTITIONER

## 2023-05-08 PROCEDURE — 1159F PR MEDICATION LIST DOCUMENTED IN MEDICAL RECORD: ICD-10-PCS | Mod: CPTII,95,, | Performed by: NURSE PRACTITIONER

## 2023-05-08 PROCEDURE — 4010F ACE/ARB THERAPY RXD/TAKEN: CPT | Mod: CPTII,95,, | Performed by: NURSE PRACTITIONER

## 2023-05-08 PROCEDURE — 4010F PR ACE/ARB THEARPY RXD/TAKEN: ICD-10-PCS | Mod: CPTII,95,, | Performed by: NURSE PRACTITIONER

## 2023-05-08 NOTE — PROGRESS NOTES
Audiovisual  Telehealth Visit     The patient location is: [place]  The chief complaint leading to consultation is: f/u  Visit type: Virtual visit   Total time spent on visit: 22min        Each patient to whom I provide medical services by telemedicine is:  (1) informed of the relationship between the physician and patient and the respective role of any other health care provider with respect to management of the patient; and (2) notified that they may decline to receive medical services by telemedicine and may withdraw from such care at any time. Patient verbally consented to receive this service via voice-only telephone call.         HPI: 10/27/21: 60yoF referred for epistaxis. She reports nosebleeds are always from right nostril and occur 3-4x/week that resolve spontaneously with pressure. She also occasionally has bleeds during the night and will wake up with blood on her clothes and bedding. She takes baby asa daily. She endorses occasional nasal congestion and frequent clear rhinorrhea. C/o frequent perioral fever blister outbreaks recently.     11/18/21: 60yoF here for f/u of epistaxis. Also c/o recent rhinorrhea and headache. Still reporting nosebleeds but frequency has decreased to 2x/week. Still reporting bleeds during the night and will wake up with blood on her clothes and bedding. She also endorses occasional nasal congestion and frequent clear rhinorrhea with associated headache. Denies congestion today, dry quality to nasal cavity, rhinotillexomania, or using the heater accessibly. She is using saline gel spray as directed at the previous visit, but did not take Allegra. She takes baby asa daily and endorses bruising.     01/06/2023: 62yoF referred for dizziness. She reports this began acutely in November. It occurs multiple times daily and lasts 2 min or less. She describes this as room spinning and feeling as if she is on a merry go round. Denies prescyncope. She has to hold on to the wall to make it to  "the bathroom. States this occurs when she changes position such as rising as well as turning over in the bed. She has been to [facility] and received meclizine which helps but does not completely resolve symptoms. She also c/o rhinitis, nasal congestion, aural pressure, & ear popping. States "I feel like a little kid with a snotty nose all the time." Denies sneezing or itchy/watery eyes. Denies HL or tinnitus. Denies any previous otologic hx, procedures, or surgeries. Denies medication changes apart from meclizine. She uses zyrtec daily and astelin BID with minimal effective. Denies head trauma.      02/22/2023: Reports dizziness has resolved following [place]. Continues to have bothersome rhinitis, though it has improved with zyrtec & atrovent. She feels that constantly wiping her nose is causing fever blister outbreaks under her nose. Occasional popping & discomfort to right ear. She is having carpet removed from her home today.      4/17/23: Reports rhinitis has improved, however, it is still bothersome. Blowing her nose frequently is causing her to have fever blisters under her nose. She was unsure if she could use tissues with aloe. She is using the atrovent QID. Having carpet removed also helped. C/o some continued discomfort and popping to right ear.     5/8/23: Continues to be bothered by rhinitis & intermittent sharp right otalgia. She is using flonase and atrovent BID as well as claritin and zyrtec daily. Denies hearing change. Admits to hx of TMJ and having her jaw lock up a few times, but she thought that had been better for the past several years. She is no longer wearing  and admits to eating ice frequently. Has not had any recent dizziness.     PE:   General: AAO, NAD, voice normal  Head/ Face: NCAT, symmetric  Ears: externally normal with grossly normal hearing  Nose: no external deformity  Neck: no appreciable swelling or abnormality  Respiratory: nonlabored  Psych: Appropriate affect/mood "     Labs:       Assessment and plan:  62 y.o. female with right BPPV (resolved), AR, chronic rhinitis. Audio with b/l HFSNHL. Hx of TMJ. Reviewed RAST, positives to dust and juniper.   - Annual audio  - NSI 1-2x/day  - Continue zyrtec Qam  - Consider benadryl Qpm in lieu of claritin  - Continue flonase BID  - Continue Atrovent nasal spray, increase to 3-4x/day prn  - Humidifier  - Reviewed home dust control measures  - Soft diet, no eating ice  - Warm/cool compresses to right TMJ  - Voltaren gel QID prn to right TMJ  - Resume   - RTC 4-6mo. Will consider allergy referral if unimproved     Mable Alcocer NP              This service was not originating from a related E/M service provided within the previous 7 days nor will  to an E/M service or procedure within the next 24 hours or my soonest available appointment.  Prevailing standard of care was able to be met in this audiovisual visit.

## 2023-05-09 NOTE — TELEPHONE ENCOUNTER
Patient called requesting a refill. She also is requesting a refill on trentinoin ointment. She says it was previously prescribed to her but not by you, she says she is breaking out. Please advise.   LOV: 02/09/23  RTC: 06/12/23

## 2023-05-11 ENCOUNTER — TELEPHONE (OUTPATIENT)
Dept: INTERNAL MEDICINE | Facility: CLINIC | Age: 63
End: 2023-05-11
Payer: MEDICAID

## 2023-05-16 RX ORDER — LISINOPRIL 5 MG/1
5 TABLET ORAL DAILY
Qty: 90 TABLET | Refills: 2 | Status: SHIPPED | OUTPATIENT
Start: 2023-05-16 | End: 2023-10-12 | Stop reason: SDUPTHER

## 2023-05-22 RX ORDER — IPRATROPIUM BROMIDE 21 UG/1
SPRAY, METERED NASAL
Qty: 30 ML | Refills: 5 | Status: SHIPPED | OUTPATIENT
Start: 2023-05-22 | End: 2023-11-10

## 2023-05-25 RX ORDER — AZELASTINE 1 MG/ML
2 SPRAY, METERED NASAL DAILY
Qty: 30 ML | Refills: 3 | Status: SHIPPED | OUTPATIENT
Start: 2023-05-25 | End: 2023-08-31

## 2023-06-01 ENCOUNTER — OFFICE VISIT (OUTPATIENT)
Dept: ENDOCRINOLOGY | Facility: CLINIC | Age: 63
End: 2023-06-01
Payer: MEDICAID

## 2023-06-01 VITALS
SYSTOLIC BLOOD PRESSURE: 107 MMHG | HEART RATE: 61 BPM | RESPIRATION RATE: 20 BRPM | HEIGHT: 62 IN | WEIGHT: 185.19 LBS | BODY MASS INDEX: 34.08 KG/M2 | DIASTOLIC BLOOD PRESSURE: 70 MMHG | TEMPERATURE: 98 F

## 2023-06-01 DIAGNOSIS — E11.65 UNCONTROLLED TYPE 2 DIABETES MELLITUS WITH HYPERGLYCEMIA: Primary | ICD-10-CM

## 2023-06-01 DIAGNOSIS — L90.9: ICD-10-CM

## 2023-06-01 PROCEDURE — 4010F ACE/ARB THERAPY RXD/TAKEN: CPT | Mod: CPTII,,, | Performed by: NURSE PRACTITIONER

## 2023-06-01 PROCEDURE — 3008F PR BODY MASS INDEX (BMI) DOCUMENTED: ICD-10-PCS | Mod: CPTII,,, | Performed by: NURSE PRACTITIONER

## 2023-06-01 PROCEDURE — 83036 HEMOGLOBIN GLYCOSYLATED A1C: CPT | Mod: PBBFAC | Performed by: NURSE PRACTITIONER

## 2023-06-01 PROCEDURE — 1159F PR MEDICATION LIST DOCUMENTED IN MEDICAL RECORD: ICD-10-PCS | Mod: CPTII,,, | Performed by: NURSE PRACTITIONER

## 2023-06-01 PROCEDURE — 3008F BODY MASS INDEX DOCD: CPT | Mod: CPTII,,, | Performed by: NURSE PRACTITIONER

## 2023-06-01 PROCEDURE — 99214 OFFICE O/P EST MOD 30 MIN: CPT | Mod: S$PBB,,, | Performed by: NURSE PRACTITIONER

## 2023-06-01 PROCEDURE — 3074F PR MOST RECENT SYSTOLIC BLOOD PRESSURE < 130 MM HG: ICD-10-PCS | Mod: CPTII,,, | Performed by: NURSE PRACTITIONER

## 2023-06-01 PROCEDURE — 3078F PR MOST RECENT DIASTOLIC BLOOD PRESSURE < 80 MM HG: ICD-10-PCS | Mod: CPTII,,, | Performed by: NURSE PRACTITIONER

## 2023-06-01 PROCEDURE — 1160F PR REVIEW ALL MEDS BY PRESCRIBER/CLIN PHARMACIST DOCUMENTED: ICD-10-PCS | Mod: CPTII,,, | Performed by: NURSE PRACTITIONER

## 2023-06-01 PROCEDURE — 99214 PR OFFICE/OUTPT VISIT, EST, LEVL IV, 30-39 MIN: ICD-10-PCS | Mod: S$PBB,,, | Performed by: NURSE PRACTITIONER

## 2023-06-01 PROCEDURE — 99215 OFFICE O/P EST HI 40 MIN: CPT | Mod: PBBFAC | Performed by: NURSE PRACTITIONER

## 2023-06-01 PROCEDURE — 1160F RVW MEDS BY RX/DR IN RCRD: CPT | Mod: CPTII,,, | Performed by: NURSE PRACTITIONER

## 2023-06-01 PROCEDURE — 3074F SYST BP LT 130 MM HG: CPT | Mod: CPTII,,, | Performed by: NURSE PRACTITIONER

## 2023-06-01 PROCEDURE — 3078F DIAST BP <80 MM HG: CPT | Mod: CPTII,,, | Performed by: NURSE PRACTITIONER

## 2023-06-01 PROCEDURE — 1159F MED LIST DOCD IN RCRD: CPT | Mod: CPTII,,, | Performed by: NURSE PRACTITIONER

## 2023-06-01 PROCEDURE — 4010F PR ACE/ARB THEARPY RXD/TAKEN: ICD-10-PCS | Mod: CPTII,,, | Performed by: NURSE PRACTITIONER

## 2023-06-01 RX ORDER — TIRZEPATIDE 5 MG/.5ML
5 INJECTION, SOLUTION SUBCUTANEOUS
Qty: 4 PEN | Refills: 0 | Status: SHIPPED | OUTPATIENT
Start: 2023-06-01 | End: 2023-11-27

## 2023-06-01 RX ORDER — FLUTICASONE PROPIONATE 50 MCG
2 SPRAY, SUSPENSION (ML) NASAL 2 TIMES DAILY
COMMUNITY
Start: 2023-05-22 | End: 2023-10-12

## 2023-06-01 RX ORDER — TRETINOIN 0.5 MG/G
CREAM TOPICAL NIGHTLY
Qty: 45 G | Refills: 3 | Status: SHIPPED | OUTPATIENT
Start: 2023-06-01

## 2023-06-01 NOTE — PROGRESS NOTES
Subjective     Patient ID: Pedro eVrgara is a 62 y.o. female.    Chief Complaint: Diabetes (Follow up )    Previous endocrine clinic notes      03/05/2021 Endocrine Clinic Note: Ms. Pedro Vergara is a 60 yo F with PMHx of T2DM, vitamin D deficiency, CKD stage III, and hypertension. She presents today for follow-up regarding diabetes management. She is currently taking Victoza 1.8, glimepiride 4, Lantus 25U, and Jardiance 10 in the am. She states that she checks blood glucose at once in the morning and once at night and endorses that she often has hypoglycemia in the mornings reaching blood glucoses in the 60s. Other times she states that her blood glucose is higher reaching around the 140s-150s. She is compliant with her diabetes medication regimen and expresses that her diet is much improved from her previous visit, eating more vegetables and healthy meats. She admits that she does not always eat breakfast, which might be contributing to her hypoglycemic episodes. Today, she says she is feeling fatigued, which she attributes to her recent COVID vaccine and hypoglycemia. (1) -Last A1C on 3/3/21 7.5% (down 8.8% in 12/2020), trending in proper direction, A1C goal <7%  -Continue Lantus 25U, Victoza 1.8 mg  -Decrease glimepiride to 2mg at breakfast with none at night given hypoglycemic episodes; if pt does not eat breakfast, instructed to take glimepiride with largest meal  -Increase Jardiance to 25 mg in am given still having hyperglycemic episodes  -Maintain fasting glucose between  and post-prandial glucose <180  -Counseled patient on importance of bringing glucose log to next visit, patient agreeable  -Encouraged patient to continue with healthy diet and to ensure she eats to prevent hypoglycemic episodes (2)     06/10/2021 Endocrine Clinic Note: 60-year-old female scheduled today as endocrine clinic follow-up.  History of uncontrolled type 2 diabetes, CKD stage III, hypertension, hyperlipidemia,  vitamin D deficiency.  Patient was previously referred to endocrine clinic is here today for follow-up visit.  Current A1c 7.9 increased from Previous A1c 7.5,  previous 8.8 and 11.0.  Patient has only checks CBG's about once a week range of 110-157.  Patient denies any symptoms of hypoglycemia.  On patient's previous visit glimepiride was changed from twice a day to once in the morning due to a.m. hypoglycemia which has resolved.  Patient was started on Victoza and was to titrate up as stated currently she is on 1.2 mg.  Hypertension at goal today.  CKD/nephropathy Patient is seen by renal clinic previous renal functions GFR 55, creatinine 1.26, BUN 16 improved from previous renal functions on 8/11/2020 GFR 42, creatinine 42, BUN 1.6.  Neuropathy patient was put on gabapentin and sees a podiatrist but was recently referred to neurologist due to sciatica. (1)     10/11/2021 Endocrine Clinic Note: 60-year-old female scheduled today for endocrine clinic follow-up.  History of uncontrolled type 2 diabetes, CKD stage III, hypertension, hyperlipidemia and vitamin D deficiency.  Uncontrolled type 2 diabetes current A1c 7.2 improved from previous 7.9, 7.5, 8.8 and 11.0. Patient checks CBGs daily fasting ranges .  Patient has frequent hypoglycemia in the a.m.  When discussing with patient patient needs at 6 PM and at times does not eat until the next day at noon.  Discussed with patient today eating breakfast in the morning.  Also DM education will discussed with patient today eating 3 meals per day. Neuropathy patient is currently on gabapentin.  Nephropathy urine micro elevated 10/27/2020 on lisinopril 5 mg repeat urine micro today.  Hypertension at goal. CKD patient is followed by renal clinic.  Patient has fever blisters breaking out around her mouth previously renal clinic is giving her Valtrex 1 g x 2 doses longer dosing not recommended due to renal functions.  Nephropathy urine micro mild elevation 12/30/2021  patient is currently on low-dose ACE.  Neuropathy discussed with patient controlling diabetes.     Endocrine clinic note 08/04/2022: 61-year-old female scheduled today for endocrine clinic follow-up.  History of uncontrolled type 2 diabetes with hypoglycemia, CKD stage 3, hypertension, hyperlipidemia vitamin-D deficiency.  Uncontrolled type 2 diabetes current A1c 7.5 previous 7.2 and 7.9.  Patient checks CBG is 1-2 times per day CBG's range fasting  in the am.  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  Will need patient's CGM alert patient hypoglycemia.  Patient reports current A1c slightly increased due to her being on a cruise recently and states she being large meals and lots of meals overnight on ADA diet.  She returned from the cruise a few days ago.     Endocrine clinic note 12/20/2022: 61 Female scheduled today as endocrine clinic follow-up.  History of type 2 diabetes, CKD stage 3 with nephropathy/neuropathy, hypertension, hyperlipidemia vitamin-D deficiency.  Type 2 diabetes current A1c 8.4 previous A1C 7.5, 7.6 and 7.2.  On patient's previous visit patient was having hypoglycemia on basal insulin was decreased from 22 units to 18 units.  Patient continued to have hypoglycemia Lantus was decreased to 12 units.  Patient had increasing A1c in the last month and a half patient has had severe vertigo and has not been active in his closely staying in bed due to severe vertigo.  Patient is in walk-in clinic and states she was giving doses of meclizine which helped but she ran out.  Patient also reports popping to bilateral ears patient has a prescription and Zyrtec but has not been taking.  Instructed patient to start Zyrtec daily use meclizine as needed and will refer her to  ENT clinic.  Patient checks CBGS 3-4 times per week ranges 76 to 170. Nephropathy patient is currently on an Ace urine micro on 08/04/2022 normal.  Patient has CKD stage 3 and is currently seen in renal Clinic encouraged patient to keep all appointments.  Patient has had recent improvement in kidney function.  Neuropathy foot exam done 08/14/2022.     Current endocrine clinic note 06/01/2023:  62-year-old female scheduled today for endocrine clinic follow-up.  History of uncontrolled type 2 diabetes with CKD stage 3 with nephropathy, neuropathy, hypertension, hyperlipidemia vitamin-D deficiency.  Type 2 diabetes current A1c increased to 10.9 from previous 8.4 and 7.5.  Patient states she initially had appetite control on Trulicity was eating better but no longer his appetite control and has started eating more foods outside of ADA diet.  She states compliance with medication.  She denies any hypoglycemia.  Previously Trulicity working uncontrolled appetite and also patient's diabetes.  Patient also reports darkness in the her eyes with small pimples states previously she was on based cream that helped improved patient had a picture tretinoin cream.        Review of Systems   Constitutional:  Negative for activity change, appetite change and fatigue.   HENT:  Negative for dental problem, hearing loss, tinnitus, trouble swallowing and goiter.    Eyes:  Negative for photophobia, pain and visual disturbance.   Respiratory:  Negative for cough, chest tightness and wheezing.    Cardiovascular:  Negative for chest pain, palpitations and leg swelling.   Gastrointestinal:  Negative for abdominal pain, constipation, diarrhea, nausea and reflux.   Endocrine: Negative for cold intolerance, heat intolerance, polydipsia and polyphagia.   Genitourinary:  Negative for difficulty urinating, flank pain, hematuria, hot flashes, menstrual irregularity, menstrual problem, nocturia and urgency.   Musculoskeletal:  Negative for back pain,  gait problem, joint swelling, leg pain and joint deformity.   Integumentary:  Negative for color change, pallor, rash and breast discharge.   Allergic/Immunologic: Negative for environmental allergies, food allergies and immunocompromised state.   Neurological:  Negative for tremors, seizures, headaches, coordination difficulties, memory loss and coordination difficulties.   Psychiatric/Behavioral:  Negative for agitation, behavioral problems and sleep disturbance. The patient is not nervous/anxious.         Objective     Physical Exam  Constitutional:       General: She is not in acute distress.     Appearance: Normal appearance. She is not ill-appearing.   HENT:      Head: Normocephalic and atraumatic.      Right Ear: External ear normal.      Left Ear: External ear normal.      Nose: Nose normal. No congestion or rhinorrhea.      Mouth/Throat:      Mouth: Mucous membranes are moist.      Pharynx: Oropharynx is clear. No oropharyngeal exudate.   Eyes:      General:         Right eye: No discharge.         Left eye: No discharge.      Conjunctiva/sclera: Conjunctivae normal.      Pupils: Pupils are equal, round, and reactive to light.   Neck:      Thyroid: No thyroid mass, thyromegaly or thyroid tenderness.   Cardiovascular:      Rate and Rhythm: Normal rate and regular rhythm.      Pulses: Normal pulses.      Heart sounds: Normal heart sounds. No murmur heard.  Pulmonary:      Effort: Pulmonary effort is normal. No respiratory distress.      Breath sounds: Normal breath sounds.   Abdominal:      General: Abdomen is flat. Bowel sounds are normal. There is no distension.      Palpations: Abdomen is soft.      Tenderness: There is no abdominal tenderness.   Musculoskeletal:         General: No swelling or tenderness. Normal range of motion.      Cervical back: Normal range of motion and neck supple. No tenderness.      Right lower leg: No edema.      Left lower leg: No edema.   Feet:      Right foot:      Skin  integrity: Skin integrity normal.      Left foot:      Skin integrity: Skin integrity normal.   Lymphadenopathy:      Cervical: No cervical adenopathy.   Skin:     General: Skin is warm and dry.      Coloration: Skin is not jaundiced or pale.   Neurological:      General: No focal deficit present.      Mental Status: She is alert and oriented to person, place, and time. Mental status is at baseline.      Coordination: Coordination normal.      Gait: Gait normal.   Psychiatric:         Mood and Affect: Mood normal.         Behavior: Behavior normal.         Thought Content: Thought content normal.          Assessment and Plan     1. Uncontrolled type 2 diabetes mellitus with hyperglycemia  Current A1C 10.9 previous  8.4 Previous A1C 7.5,  7.6, 7.2, 7.9, 7.5, 8.8, 11.0  Urine Micro Creatine/ratio: 08/04/2022 normal   Medications: Glimepiride 2 mg Qday, Jardiance 25mg Qday Lantus 12 unit Trulicity 1.5mg Changes: Increase Lantus 18 units, Change Trulciity to Monjaro 5 mg x 4 weeks , then increase to 7.5 mg x 4 week then 10 mg q week   Eye Exam: 11/14/2022 Fundus  Foot Exam: 08/04/2022  Home Glucometer Use: Once a day   Last Hypoglycemic episode: None   Follow ADA diet, avoid soda, simple sweets, and limit rice, breads and starches  Maintain healthy weight, exercise 4-5 times a week for 30 minutes  Diet and medication compliance discussed on visit  RTC 4 months   -     Hemoglobin A1C, POCT  -     tirzepatide (MOUNJARO) 5 mg/0.5 mL PnIj; Inject 5 mg into the skin every 7 days.  Dispense: 4 pen; Refill: 0  -     tirzepatide 7.5 mg/0.5 mL PnIj; Inject 7.5 mg into the skin every 7 days.  Dispense: 4 pen; Refill: 0  -     tirzepatide 10 mg/0.5 mL PnIj; Inject 10 mg into the skin every 7 days.  Dispense: 4 pen; Refill: 5    2. Facial skin atrophy  -     tretinoin (RETIN-A) 0.05 % cream; Apply topically every evening.  Dispense: 45 g; Refill: 3      I spent a total of 30 minutes on the day of the visit.  This includes face to  face time and non-face to face time preparing to see the patient (eg, review of tests), obtaining and/or reviewing separately obtained history, documenting clinical information in the electronic or other health record, independently interpreting results and communicating results to the patient/family/caregiver, or care coordinator.

## 2023-06-05 ENCOUNTER — LAB VISIT (OUTPATIENT)
Dept: LAB | Facility: HOSPITAL | Age: 63
End: 2023-06-05
Attending: NURSE PRACTITIONER
Payer: MEDICAID

## 2023-06-05 DIAGNOSIS — E11.9 TYPE 2 DIABETES MELLITUS WITHOUT COMPLICATION, WITH LONG-TERM CURRENT USE OF INSULIN: Chronic | ICD-10-CM

## 2023-06-05 DIAGNOSIS — Z79.4 TYPE 2 DIABETES MELLITUS WITHOUT COMPLICATION, WITH LONG-TERM CURRENT USE OF INSULIN: Chronic | ICD-10-CM

## 2023-06-05 LAB
CREAT UR-MCNC: 91.8 MG/DL (ref 47–110)
MICROALBUMIN UR-MCNC: <5 UG/ML
MICROALBUMIN/CREAT RATIO PNL UR: <5.4 MG/GM CR (ref 0–30)

## 2023-06-05 PROCEDURE — 82043 UR ALBUMIN QUANTITATIVE: CPT

## 2023-06-06 LAB — HBA1C MFR BLD: 10.9 %

## 2023-06-12 ENCOUNTER — OFFICE VISIT (OUTPATIENT)
Dept: INTERNAL MEDICINE | Facility: CLINIC | Age: 63
End: 2023-06-12
Payer: MEDICAID

## 2023-06-12 ENCOUNTER — DOCUMENTATION ONLY (OUTPATIENT)
Dept: INTERNAL MEDICINE | Facility: CLINIC | Age: 63
End: 2023-06-12
Payer: MEDICAID

## 2023-06-12 VITALS
BODY MASS INDEX: 34.41 KG/M2 | TEMPERATURE: 98 F | HEART RATE: 62 BPM | DIASTOLIC BLOOD PRESSURE: 69 MMHG | HEIGHT: 62 IN | WEIGHT: 187 LBS | SYSTOLIC BLOOD PRESSURE: 103 MMHG | RESPIRATION RATE: 18 BRPM

## 2023-06-12 DIAGNOSIS — Z12.12 ENCOUNTER FOR COLORECTAL CANCER SCREENING: ICD-10-CM

## 2023-06-12 DIAGNOSIS — E78.2 MIXED HYPERLIPIDEMIA: Primary | Chronic | ICD-10-CM

## 2023-06-12 DIAGNOSIS — E11.9 TYPE 2 DIABETES MELLITUS WITHOUT COMPLICATION, WITH LONG-TERM CURRENT USE OF INSULIN: Chronic | ICD-10-CM

## 2023-06-12 DIAGNOSIS — I10 PRIMARY HYPERTENSION: Chronic | ICD-10-CM

## 2023-06-12 DIAGNOSIS — G47.00 INSOMNIA, UNSPECIFIED TYPE: ICD-10-CM

## 2023-06-12 DIAGNOSIS — Z79.4 TYPE 2 DIABETES MELLITUS WITHOUT COMPLICATION, WITH LONG-TERM CURRENT USE OF INSULIN: Chronic | ICD-10-CM

## 2023-06-12 DIAGNOSIS — E66.09 CLASS 1 OBESITY DUE TO EXCESS CALORIES WITH SERIOUS COMORBIDITY AND BODY MASS INDEX (BMI) OF 34.0 TO 34.9 IN ADULT: Chronic | ICD-10-CM

## 2023-06-12 DIAGNOSIS — Z12.11 ENCOUNTER FOR COLORECTAL CANCER SCREENING: ICD-10-CM

## 2023-06-12 DIAGNOSIS — M54.50 ACUTE LEFT-SIDED LOW BACK PAIN WITHOUT SCIATICA: ICD-10-CM

## 2023-06-12 PROCEDURE — 99214 PR OFFICE/OUTPT VISIT, EST, LEVL IV, 30-39 MIN: ICD-10-PCS | Mod: S$PBB,,, | Performed by: NURSE PRACTITIONER

## 2023-06-12 PROCEDURE — 3074F SYST BP LT 130 MM HG: CPT | Mod: CPTII,,, | Performed by: NURSE PRACTITIONER

## 2023-06-12 PROCEDURE — 99215 OFFICE O/P EST HI 40 MIN: CPT | Mod: PBBFAC | Performed by: NURSE PRACTITIONER

## 2023-06-12 PROCEDURE — 3046F PR MOST RECENT HEMOGLOBIN A1C LEVEL > 9.0%: ICD-10-PCS | Mod: CPTII,,, | Performed by: NURSE PRACTITIONER

## 2023-06-12 PROCEDURE — 3061F PR NEG MICROALBUMINURIA RESULT DOCUMENTED/REVIEW: ICD-10-PCS | Mod: CPTII,,, | Performed by: NURSE PRACTITIONER

## 2023-06-12 PROCEDURE — 1159F MED LIST DOCD IN RCRD: CPT | Mod: CPTII,,, | Performed by: NURSE PRACTITIONER

## 2023-06-12 PROCEDURE — 99214 OFFICE O/P EST MOD 30 MIN: CPT | Mod: S$PBB,,, | Performed by: NURSE PRACTITIONER

## 2023-06-12 PROCEDURE — 3074F PR MOST RECENT SYSTOLIC BLOOD PRESSURE < 130 MM HG: ICD-10-PCS | Mod: CPTII,,, | Performed by: NURSE PRACTITIONER

## 2023-06-12 PROCEDURE — 4010F PR ACE/ARB THEARPY RXD/TAKEN: ICD-10-PCS | Mod: CPTII,,, | Performed by: NURSE PRACTITIONER

## 2023-06-12 PROCEDURE — 3066F NEPHROPATHY DOC TX: CPT | Mod: CPTII,,, | Performed by: NURSE PRACTITIONER

## 2023-06-12 PROCEDURE — 3078F DIAST BP <80 MM HG: CPT | Mod: CPTII,,, | Performed by: NURSE PRACTITIONER

## 2023-06-12 PROCEDURE — 1159F PR MEDICATION LIST DOCUMENTED IN MEDICAL RECORD: ICD-10-PCS | Mod: CPTII,,, | Performed by: NURSE PRACTITIONER

## 2023-06-12 PROCEDURE — 3061F NEG MICROALBUMINURIA REV: CPT | Mod: CPTII,,, | Performed by: NURSE PRACTITIONER

## 2023-06-12 PROCEDURE — 3008F BODY MASS INDEX DOCD: CPT | Mod: CPTII,,, | Performed by: NURSE PRACTITIONER

## 2023-06-12 PROCEDURE — 1160F PR REVIEW ALL MEDS BY PRESCRIBER/CLIN PHARMACIST DOCUMENTED: ICD-10-PCS | Mod: CPTII,,, | Performed by: NURSE PRACTITIONER

## 2023-06-12 PROCEDURE — 3008F PR BODY MASS INDEX (BMI) DOCUMENTED: ICD-10-PCS | Mod: CPTII,,, | Performed by: NURSE PRACTITIONER

## 2023-06-12 PROCEDURE — 3066F PR DOCUMENTATION OF TREATMENT FOR NEPHROPATHY: ICD-10-PCS | Mod: CPTII,,, | Performed by: NURSE PRACTITIONER

## 2023-06-12 PROCEDURE — 4010F ACE/ARB THERAPY RXD/TAKEN: CPT | Mod: CPTII,,, | Performed by: NURSE PRACTITIONER

## 2023-06-12 PROCEDURE — 1160F RVW MEDS BY RX/DR IN RCRD: CPT | Mod: CPTII,,, | Performed by: NURSE PRACTITIONER

## 2023-06-12 PROCEDURE — 3078F PR MOST RECENT DIASTOLIC BLOOD PRESSURE < 80 MM HG: ICD-10-PCS | Mod: CPTII,,, | Performed by: NURSE PRACTITIONER

## 2023-06-12 PROCEDURE — 3046F HEMOGLOBIN A1C LEVEL >9.0%: CPT | Mod: CPTII,,, | Performed by: NURSE PRACTITIONER

## 2023-06-12 RX ORDER — METOPROLOL SUCCINATE 25 MG/1
25 TABLET, EXTENDED RELEASE ORAL DAILY
Qty: 90 TABLET | Refills: 2 | Status: SHIPPED | OUTPATIENT
Start: 2023-06-12 | End: 2023-10-12 | Stop reason: SDUPTHER

## 2023-06-12 RX ORDER — TRAZODONE HYDROCHLORIDE 50 MG/1
25 TABLET ORAL NIGHTLY PRN
Qty: 30 TABLET | Refills: 3 | Status: SHIPPED | OUTPATIENT
Start: 2023-06-12 | End: 2023-10-12

## 2023-06-12 RX ORDER — CYCLOBENZAPRINE HCL 10 MG
10 TABLET ORAL NIGHTLY PRN
Qty: 15 TABLET | Refills: 1 | Status: SHIPPED | OUTPATIENT
Start: 2023-06-12 | End: 2024-02-15 | Stop reason: SDUPTHER

## 2023-06-12 RX ORDER — ATORVASTATIN CALCIUM 20 MG/1
20 TABLET, FILM COATED ORAL DAILY
Qty: 90 TABLET | Refills: 2 | Status: SHIPPED | OUTPATIENT
Start: 2023-06-12 | End: 2023-10-12 | Stop reason: SDUPTHER

## 2023-06-12 RX ORDER — PREGABALIN 75 MG/1
75 CAPSULE ORAL 3 TIMES DAILY
COMMUNITY
Start: 2023-06-09 | End: 2024-01-31

## 2023-06-12 NOTE — PROGRESS NOTES
Patient ID: [id number]     Chief Complaint: Follow-up (C/o right side pain unsure if pulled a muscle. Pain for 4 days.)    HPI:     Pedro Vergara is a 62 y.o. female with diagnosis of HTN, HLD, DM2, CKD, Obesity. Patient seen per audio visit today for follow up on neck/knee pain. Patient last seen in clinic on 12/20/2022.   Today, patient states left sided back pain. Patient states she was helping her father out of the care when he went to fall and she braced him with her hip. States she has been having left sided back pain since. Patient denies sciatica pain, saddle anesthesia, bowel/bowel incontinence. Patient previously prescribed Flexeril for neck pain, states she is out of that medication.   Patient also states trouble sleeping. States her brother-in-law passed away about a month ago and she has been having trouble sleeping since. Patient denies depression, anxiety, thoughts of harming herself/others. Patient currently on Cymbalta for bilateral foot pain, prescribed by Podiatrist.   Patient denies any other acute complaints.   Cervical X-ray completed on 12/30/2022; indicated no acute abnormality identified, multilevel degenerative changes of the cervical spine.   Right knee X-ray completed on 12/30/2022; indicated narrowing of the medial compartment of the knee joint with presence of marginal osteophytes articular spaces are otherwise preserved with smooth articular surfaces.     Patient followed by Endocrinology Clinic. Last appointment on 06/01/2023. Uncontrolled type 2 diabetes mellitus with hyperglycemia: Current A1C 10.9, previous  8.4. Urine Micro Creatine/ratio: 08/04/2022 normal. Medications: Glimepiride 2 mg Qday, Jardiance 25mg Qday, Lantus 12 units daily, Trulicity 1.5mg. Changes: Increase Lantus 18 units, Change Trulciity to Monjaro 5 mg x 4 weeks , then increase to 7.5 mg x 4 week then 10 mg q week. Eye Exam: 11/14/2022. Foot Exam: 08/04/2022. Home Glucometer Use: Once a day. Last Hypoglycemic  episode: None. Follow ADA diet, avoid soda, simple sweets, and limit rice, breads and starches. Maintain healthy weight, exercise 4-5 times a week for 30 minutes. Diet and medication compliance discussed on visit. Patient has follow up appointment scheduled for 11/02/2023.     Patient followed by ENT clinic. Last appointment on 05/08/2023. 62 y.o. female with right BPPV (resolved), AR, Chronic rhinitis. Audio with b/l HFSNHL. Hx of TMJ. Reviewed RAST, positives to dust and juniper. Annual audio. NSI 1-2x/day. Continue zyrtec Qam, flonase bid. Consider benadryl Qpm in lieu of claritin. Increase Atrovent nasal spray to 3-4x/day prn. Humidifier. Reviewed home dust control measures. Soft diet, no eating ice. Warm/cool compresses to right TMJ. Voltaren gel QID prn to right TMJ. Resume . RTC 4-6mo. Will consider allergy referral if unimproved. Patient has follow up appointment scheduled for 10/10/2023.     Patient followed by Orthopedic Clinic. Last appointment on 03/27/2023. Primary osteoarthritis of right knee: Dx: right knee osteoarthritis - New problem. Treatment Plan: Discussed with patient diagnosis, prognosis, and treatment recommendations. Education provided. Conservative treatment plan discussed. Imaging: prior radiological studies independently reviewed; discussed with patient; agree with radiologist interpretation. Weight Management: is paramount. recommend at least 10% of total body weight loss if your bmi is 30-34.9. A BMI 24.9 or less may provide further relief. Procedure: Discussed CSI/VSI as treatment options; She request VSI. She has tried over the counter tylenol and Voltaren so far, but  has not yet had a trial of HEP/PT. Submit for PA for VS. Activity: Activity as tolerated; HEP to include aerobic conditioning and strength training with non-painful activity. ROM/STG exercises. Proper footware; assistive devises to avoid limping. Therapy: HEP/Physical Therapy. Medication: CONTINUE Voltaren  gel, recommended tylenol 1000mg TID x 3 day. Avoid NSAIDs due to CKD. RTC: PRN; call if any issues.      Patient states followed Dr. Reymundo Avery, Podiatrist in [place].      Patient states followed by Medical Clinic in [place] eye [facility].      Patient followed by Nephrology Clinic. Last appointment on 09/07/2022. Pedro Vergara is a 61 y.o. Black or  female with past medical history of chronic kidney disease, diabetes mellitus, hypertension, hyperlipidemia, glaucoma, and obesity. Presents for scheduled follow-up appointment in nephrology clinic. Dx: CKD stage G3a/A1, GFR 45-59 and albumin creatinine ratio <30 mg/g: Diabetic kidney disease.  Continue SGLT 2 inhibitor and ACE-inhibitor.  Continue renal sparing activities.  Return to clinic with routine labs in 6 months. Patient has follow up appointment scheduled for 09/06/2023.     Patient seen by Cardiology Clinic for abnormal stress test. Last appointment on 07/09/2021. She completed an echocardiogram on 6.3.21 that revealed an intact ejection fraction approximately 55% (see full report below). The patient had a Lexiscan stress test on 4.5.21 that revealed possible apical ischemia.  She had a subsequent LHC on 6.7.21 that revealed normal coronaries arteries. Continue ASA, Lipitor, lisinopril, and SL nitro prn. Follow Patient was to follow up in Cardiology clinic in 5 months, no follow up noted. Follow up with PCP as directed    Review of patient's allergies indicates:   Allergen Reactions    Adhesive tape-silicones      Breast Cancer Screening: MMG negative on 01/11/2023  Cervical Cancer Screening: Hysterectomy  Colorectal Cancer Screening: Colonoscopy on 08/22/2014 with recommended repeat in 5 years.   Diabetic Eye Exam: 11/14/2022  Diabetic Foot Exam: 08/04/2022  Lung Cancer Screening: N/A  Prostate Cancer Screening: N/A  AAA Screening: N/A  Osteoporosis Screening: deferred due to age  Medicare Wellness: N/A  Immunizations:    Immunization History   Administered Date(s) Administered    COVID-19, MRNA, LN-S, PF (Pfizer) (Gray Cap) 07/06/2022    COVID-19, MRNA, LN-S, PF (Pfizer) (Purple Cap) 02/10/2021, 03/03/2021, 10/11/2021    COVID-19, mRNA, LNP-S, bivalent booster, PF (PFIZER OMICRON) 11/01/2022    Hepatitis A / Hepatitis B 07/06/2022, 11/14/2022    Influenza 01/23/2013, 02/26/2014, 10/22/2014    Influenza (FLUBLOK) - Quadrivalent - Recombinant - PF *Preferred* (egg allergy) 10/02/2022    Influenza - Quadrivalent - MDCK - PF 10/18/2018, 10/06/2020    Influenza - Quadrivalent - PF *Preferred* (6 months and older) 09/13/2016, 09/21/2017, 11/10/2019, 09/25/2021    Influenza - Trivalent - PF (ADULT) 11/21/2015, 09/13/2016, 09/21/2017, 10/18/2018, 11/10/2019, 10/06/2020, 09/25/2021    Pneumococcal 02/25/2014    Pneumococcal Conjugate - 20 Valent 11/01/2022    Pneumococcal Polysaccharide - 23 Valent 09/10/2012    Tdap 06/20/2016    Zoster Recombinant 09/23/2021, 03/24/2022     Past Surgical History:   Procedure Laterality Date    CARPAL TUNNEL RELEASE      CARPAL TUNNEL RELEASE Right 07/06/2016    COLONOSCOPY  08/22/2014    HYSTERECTOMY      tendon sheath incision      TONSILLECTOMY      TUBAL LIGATION       family history includes Cancer in her father and mother; Diabetes in her maternal grandmother and paternal grandmother; Hypertension in her father.    Social History     Socioeconomic History    Marital status: Single   Tobacco Use    Smoking status: Never    Smokeless tobacco: Never    Tobacco comments:     None   Substance and Sexual Activity    Alcohol use: Not Currently     Comment: None    Drug use: Never    Sexual activity: Not Currently     Birth control/protection: None     Social Determinants of Health     Physical Activity: Inactive    Days of Exercise per Week: 0 days    Minutes of Exercise per Session: 0 min     Current Outpatient Medications   Medication Instructions    ACCU-CHEK GUIDE TEST STRIPS Strp CHECK GLUCOSE TWICE A  "DAY    acyclovir 5% (ZOVIRAX) 5 % ointment APPLY TO AFFECTED AREA EVERY 4 HOURS    atorvastatin (LIPITOR) 20 mg, Oral, Daily    azelastine (ASTELIN) 274 mcg, Nasal, Daily    BD NORMA 2ND GEN PEN NEEDLE 32 gauge x 5/32" Ndle USE DAILY TO INJECT INSULIN    BD ULTRA-FINE SHORT PEN NEEDLE 31 gauge x 5/16" Ndle USE DAILY AS DIRECTED    blood sugar diagnostic (ACCU-CHEK GUIDE TEST STRIPS MISC)   ACCU-CHEK GUIDE TEST STRIP, See Instructions, USE ONE STRIP TO TEST BLOOD GLUCOSE TWICE DAILY, # 50 unknown unit, 3 Refill(s), Pharmacy: [facility] STORE [id number], 158, cm, Height/Length Dosing, 11/18/21 13:08:00 CST, 85.9, kg, Weight Dosing, 11/18/21 13:08:00 CST    cyclobenzaprine (FLEXERIL) 10 mg, Oral, Nightly PRN    diclofenac sodium (VOLTAREN) 1 % Gel APPLY 2 GRAMS TO AFFECTED AREA 3 TIMES A DAY    DULoxetine (CYMBALTA) 60 mg, Oral, Nightly    famotidine (PEPCID) 40 mg, Oral, Daily    fluticasone propionate (FLONASE) 50 mcg/actuation nasal spray 2 sprays, Each Nostril, 2 times daily    glimepiride (AMARYL) 2 MG tablet TAKE 1 TABLET BY MOUTH EVERY DAY WITH THE FIRST MEAL OF THE DAY    insulin (LANTUS SOLOSTAR U-100 INSULIN) 18 Units, Subcutaneous, Daily    ipratropium (ATROVENT) 21 mcg (0.03 %) nasal spray SPRAY 1 SPRAY IN EACH NOSTRIL 4 (FOUR) TIMES DAILY AS NEEDED FOR RUNNY NOSE    JARDIANCE 25 mg, Oral, Daily    lisinopriL (PRINIVIL,ZESTRIL) 5 mg, Oral, Daily    loratadine (CLARITIN) 10 mg, Oral, Daily    medical supply, miscellaneous (0.2 MICRON FILTER ATTACHMENT MISC)   BD UF short Pen Needle 8MMx 31g, See Instructions, Use daily to inject insulin' Dx Code E11.9 LON 99 mon, # 100 EA, 11 Refill(s), Pharmacy: [facility]/pharmacy #[id number], 157, cm, Height/Length Dosing, 06/10/21 13:27:00 CDT, 85.4, kg, Weight Dosing, 06/10/21 1...    metoprolol succinate (TOPROL-XL) 25 mg, Oral, Daily    MOUNJARO 5 mg, Subcutaneous, Every 7 days    mupirocin (BACTROBAN) 2 % ointment Topical (Top), 3 times daily    nitroGLYCERIN (NITROSTAT) 0.4 mg, Sublingual    " "pen needle, diabetic 32 gauge x 5/32" Ndle BD yanelis insulin pen needle for once a day injections    pregabalin (LYRICA) 75 mg, Oral, 3 times daily    [START ON 7/1/2023] tirzepatide 7.5 mg, Subcutaneous, Every 7 days    [START ON 8/1/2023] tirzepatide 10 mg, Subcutaneous, Every 7 days    traZODone (DESYREL) 25 mg, Oral, Nightly PRN    tretinoin (RETIN-A) 0.05 % cream Topical (Top), Nightly       Subjective:     Review of Systems   Constitutional: Negative.    HENT: Negative.     Eyes: Negative.    Respiratory: Negative.     Cardiovascular: Negative.    Gastrointestinal: Negative.    Endocrine: Negative.    Genitourinary: Negative.    Musculoskeletal:  Positive for back pain.   Skin: Negative.    Allergic/Immunologic: Negative.    Neurological: Negative.    Hematological: Negative.    Psychiatric/Behavioral: Negative.       Objective:     Visit Vitals  /69 (BP Location: Left arm, Patient Position: Sitting, BP Method: Large (Automatic))   Pulse 62   Temp 98.4 °F (36.9 °C) (Oral)   Resp 18   Ht 5' 2.01" (1.575 m)   Wt 84.8 kg (187 lb)   BMI 34.19 kg/m²       Physical Exam  Vitals reviewed.   Constitutional:       Appearance: Normal appearance. She is obese.   HENT:      Head: Normocephalic and atraumatic.      Mouth/Throat:      Mouth: Mucous membranes are moist.      Pharynx: Oropharynx is clear.   Eyes:      Extraocular Movements: Extraocular movements intact.      Conjunctiva/sclera: Conjunctivae normal.      Pupils: Pupils are equal, round, and reactive to light.   Cardiovascular:      Rate and Rhythm: Normal rate and regular rhythm.      Heart sounds: Normal heart sounds.   Pulmonary:      Effort: Pulmonary effort is normal.      Breath sounds: Normal breath sounds.   Abdominal:      General: Bowel sounds are normal.   Musculoskeletal:         General: Normal range of motion.      Cervical back: Normal range of motion.   Skin:     General: Skin is warm and dry.   Neurological:      Mental Status: She is alert " and oriented to person, place, and time.   Psychiatric:         Mood and Affect: Mood normal.         Behavior: Behavior normal.       Labs Reviewed:     Hematology:  Lab Results   Component Value Date    WBC 8.07 06/05/2023    HGB 13.4 06/05/2023    HCT 41.4 06/05/2023     06/05/2023     Chemistry:  Lab Results   Component Value Date     06/05/2023    K 4.6 06/05/2023    CHLORIDE 107 06/05/2023    BUN 14.0 06/05/2023    CREATININE 1.01 06/05/2023    EGFRNORACEVR 62 06/05/2023    GLUCOSE 204 (H) 06/05/2023    CALCIUM 9.2 06/05/2023    ALKPHOS 75 06/05/2023    LABPROT 7.1 06/05/2023    ALBUMIN 4.3 06/05/2023    BILIDIR 0.2 12/30/2021    IBILI 0.30 12/30/2021    AST 23 06/05/2023    ALT 15 06/05/2023    MG 1.80 10/16/2022    PHOS 3.3 10/27/2020    [id number] 18.9 (L) 10/27/2020     Lab Results   Component Value Date    HGBA1C 8.6 (H) 12/30/2022     Lipid Panel:  Lab Results   Component Value Date    CHOL 176 06/05/2023    CHOL 159 06/16/2022    HDL 52 06/05/2023    HDL 55 06/16/2022    LDL 83.00 12/30/2022    TRIG 143 06/05/2023    TRIG 169 (A) 06/16/2022    TOTALCHOLEST 4 12/30/2022     Thyroid:  Lab Results   Component Value Date    TSH 1.416 12/30/2022     Urine:  Lab Results   Component Value Date    COLORUA Colorless (A) 12/30/2022    APPEARANCEUA Clear 12/30/2022    SGUA 1.029 12/30/2022    PHUA 5.5 12/30/2022    PROTEINUA Negative 12/30/2022    GLUCOSEUA 4+ (A) 12/30/2022    KETONESUA Negative 12/30/2022    BLOODUA Negative 12/30/2022    NITRITESUA Negative 12/30/2022    LEUKOCYTESUR Negative 12/30/2022    RBCUA 0-5 12/30/2022    WBCUA 0-5 12/30/2022    BACTERIA None Seen 12/30/2022    SQEPUA Trace (A) 12/30/2022    HYALINECASTS None Seen 12/30/2022    CREATRANDUR 91.8 06/05/2023    PROTEINURINE 7.8 12/30/2021        Assessment:       ICD-10-CM ICD-9-CM   1. Mixed hyperlipidemia  E78.2 272.2   2. Primary hypertension  I10 401.9   3. Type 2 diabetes mellitus without complication, with long-term  current use of insulin  E11.9 250.00    Z79.4 V58.67   4. Class 1 obesity due to excess calories with serious comorbidity and body mass index (BMI) of 34.0 to 34.9 in adult  E66.09 278.00    Z68.34 V85.34   5. Encounter for colorectal cancer screening  Z12.11 V76.51    Z12.12 V76.41   6. Insomnia, unspecified type  G47.00 780.52        Plan:     1. Mixed hyperlipidemia  Increase Atorvastatin to 20 mg daily  Weight loss encouraged  Low fat/high fiber diet  Increase physical activity  Cholesterol Total   Date Value Ref Range Status   06/05/2023 176 0 - 200 mg/dL Final     HDL Cholesterol   Date Value Ref Range Status   06/05/2023 52 40 - 60 mg/dL Final     Triglyceride   Date Value Ref Range Status   06/05/2023 143 30 - 200 mg/dL Final     LDL Cholesterol   Date Value Ref Range Status   12/30/2022 83.00 50.00 - 140.00 mg/dL Final   - Comprehensive Metabolic Panel; Future  - Lipid Panel; Future    2. Primary hypertension  Vitals:    06/12/23 1225   BP: 103/69   Pulse: 62   Resp: 18   Temp: 98.4 °F (36.9 °C)      Results for orders placed or performed in visit on 06/05/23   Microalbumin/Creatinine Ratio, Urine   Result Value Ref Range    Urine Microalbumin <5.0 <=30.0 ug/ml    Urine Creatinine 91.8 47.0 - 110.0 mg/dL    Microalbumin Creatinine Ratio <5.4 0.0 - 30.0 mg/gm Cr     Results for orders placed or performed during the hospital encounter of 10/16/22   EKG 12-lead    Collection Time: 10/16/22  4:21 PM    Narrative    Test Reason : R53.1,    Vent. Rate : 068 BPM     Atrial Rate : 068 BPM     P-R Int : 138 ms          QRS Dur : 076 ms      QT Int : 386 ms       P-R-T Axes : 033 016 021 degrees     QTc Int : 410 ms    Normal sinus rhythm with sinus arrhythmia  Minimal voltage criteria for LVH, may be normal variant  Borderline Abnormal ECG  No previous ECGs available  Confirmed by Marshall Lockett MD ([id number]) on 10/17/2022 8:27:56 AM    Referred By: [email or web address]   SELF           Confirmed By:Marshall Lockett MD   Continue  Lisinopril 5 mg daily, Metoprolol Succ 25 mg daily  DASH diet  Encouraged home blood pressure monitoring    3. Type 2 diabetes mellitus without complication, with long-term current use of insulin  Continue Glimepiride 2 mg daily, Lantus 18 units daily, Jardiance 25 mg daily, Mounjaro 10 mg weekly  Encouraged home CBG monitoring.  Hypoglycemic episodes: denies  Body mass index is 34.19 kg/m².  Hemoglobin A1c   Date Value Ref Range Status   12/30/2022 8.6 (H) <=7.0 % Final     Comment:     Please send orders to [facility]     Results for orders placed or performed in visit on 06/05/23   Microalbumin/Creatinine Ratio, Urine   Result Value Ref Range    Urine Microalbumin <5.0 <=30.0 ug/ml    Urine Creatinine 91.8 47.0 - 110.0 mg/dL    Microalbumin Creatinine Ratio <5.4 0.0 - 30.0 mg/gm Cr   On Atorvastatin  On Lisinopril  Weight Loss Encouraged  ADA Diet    4. Acute left-sided low back pain without sciatica  Restart Flexeril 10 mg Qhs prn    5. Class 1 obesity due to excess calories with serious comorbidity and body mass index (BMI) of 34.0 to 34.9 in adult  Body mass index is 34.19 kg/m².  Thyroid Stimulating Hormone   Date Value Ref Range Status   12/30/2022 1.416 0.350 - 4.940 uIU/mL Final     Comment:     Please send orders to [facility]     Vit D 25 OH   Date Value Ref Range Status   10/27/2020 18.9 (L) 30.0 - 80.0 ng/mL Final     Hemoglobin A1c   Date Value Ref Range Status   12/30/2022 8.6 (H) <=7.0 % Final     Comment:     Please send orders to [facility]   Sleep Study: none noted  Weight Loss Encouraged  Increase Physical Activity    6. Encounter for colorectal cancer screening  - Ambulatory referral/consult to Endo Procedure ; Future    7. Insomnia  Start Trazodone 25 mg Qhs prn       Follow up in about 4 months (around 10/12/2023) for Labs. In addition to their scheduled follow up, the patient has also been instructed to follow up on as needed  basis.     Cayla Bruno, CHANDNIP

## 2023-06-15 ENCOUNTER — TELEPHONE (OUTPATIENT)
Dept: GASTROENTEROLOGY | Facility: CLINIC | Age: 63
End: 2023-06-15
Payer: MEDICAID

## 2023-08-11 ENCOUNTER — TELEPHONE (OUTPATIENT)
Dept: ENDOCRINOLOGY | Facility: CLINIC | Age: 63
End: 2023-08-11
Payer: MEDICAID

## 2023-08-11 DIAGNOSIS — B37.31 CANDIDIASIS OF VAGINA: Primary | ICD-10-CM

## 2023-08-11 RX ORDER — FLUCONAZOLE 100 MG/1
100 TABLET ORAL
Qty: 3 TABLET | Refills: 1 | Status: SHIPPED | OUTPATIENT
Start: 2023-08-11 | End: 2024-02-15 | Stop reason: ALTCHOICE

## 2023-08-11 RX ORDER — FLUCONAZOLE 100 MG/1
100 TABLET ORAL
Qty: 1 TABLET | Refills: 1 | Status: SHIPPED | OUTPATIENT
Start: 2023-08-11 | End: 2023-08-11 | Stop reason: SDUPTHER

## 2023-08-11 NOTE — TELEPHONE ENCOUNTER
Patient complaining of yeast infection thinks may be the Jardiance causing.  Requesting erx be sent to [facility] in [place].  Please advice.

## 2023-08-11 NOTE — TELEPHONE ENCOUNTER
----- Message from Kristyn Monge sent at 8/11/2023  9:41 AM CDT -----  Regarding: Pt call  Mary    Pt asking for a call back regarding a possible yeast infection; wondering if it's coming from her insulin. [phone]

## 2023-08-29 ENCOUNTER — TELEPHONE (OUTPATIENT)
Dept: ENDOCRINOLOGY | Facility: CLINIC | Age: 63
End: 2023-08-29
Payer: MEDICAID

## 2023-08-29 ENCOUNTER — LAB VISIT (OUTPATIENT)
Dept: LAB | Facility: HOSPITAL | Age: 63
End: 2023-08-29
Attending: NURSE PRACTITIONER
Payer: MEDICAID

## 2023-08-29 DIAGNOSIS — N18.31 CKD STAGE G3A/A1, GFR 45-59 AND ALBUMIN CREATININE RATIO <30 MG/G: ICD-10-CM

## 2023-08-29 LAB
ALBUMIN SERPL-MCNC: 4.2 G/DL (ref 3.4–5)
ALBUMIN/GLOB SERPL: 1.3 RATIO
ALP SERPL-CCNC: 74 UNIT/L (ref 50–144)
ALT SERPL-CCNC: 14 UNIT/L (ref 1–45)
ANION GAP SERPL CALC-SCNC: 7 MEQ/L (ref 2–13)
APPEARANCE UR: CLEAR
AST SERPL-CCNC: 24 UNIT/L (ref 14–36)
BILIRUB SERPL-MCNC: 0.7 MG/DL (ref 0–1)
BILIRUB UR QL STRIP.AUTO: NEGATIVE
BUN SERPL-MCNC: 14 MG/DL (ref 7–20)
CALCIUM SERPL-MCNC: 9.4 MG/DL (ref 8.4–10.2)
CHLORIDE SERPL-SCNC: 107 MMOL/L (ref 98–110)
CO2 SERPL-SCNC: 24 MMOL/L (ref 21–32)
COLOR UR: YELLOW
CREAT SERPL-MCNC: 1.02 MG/DL (ref 0.66–1.25)
CREAT UR-MCNC: 110.7 MG/DL
CREAT/UREA NIT SERPL: 14 (ref 12–20)
GFR SERPLBLD CREATININE-BSD FMLA CKD-EPI: 62 MLS/MIN/1.73/M2
GLOBULIN SER-MCNC: 3.2 GM/DL (ref 2–3.9)
GLUCOSE SERPL-MCNC: 147 MG/DL (ref 70–115)
GLUCOSE UR QL STRIP.AUTO: >=1000
KETONES UR QL STRIP.AUTO: ABNORMAL
LEUKOCYTE ESTERASE UR QL STRIP.AUTO: NEGATIVE
NITRITE UR QL STRIP.AUTO: NEGATIVE
PH UR STRIP.AUTO: 6 [PH]
POTASSIUM SERPL-SCNC: 3.8 MMOL/L (ref 3.5–5.1)
PROT SERPL-MCNC: 7.4 GM/DL (ref 6.3–8.2)
PROT UR QL STRIP.AUTO: NEGATIVE
PROT UR STRIP-MCNC: 9 MG/DL
RBC UR QL AUTO: NEGATIVE
SODIUM SERPL-SCNC: 138 MMOL/L (ref 135–145)
SP GR UR STRIP.AUTO: 1.02 (ref 1–1.03)
URINE PROTEIN/CREATININE RATIO (OHS): 0.1
UROBILINOGEN UR STRIP-ACNC: 0.2

## 2023-08-29 PROCEDURE — 81003 URINALYSIS AUTO W/O SCOPE: CPT

## 2023-08-29 PROCEDURE — 36415 COLL VENOUS BLD VENIPUNCTURE: CPT

## 2023-08-29 PROCEDURE — 82570 ASSAY OF URINE CREATININE: CPT

## 2023-08-29 PROCEDURE — 80053 COMPREHEN METABOLIC PANEL: CPT

## 2023-08-29 NOTE — TELEPHONE ENCOUNTER
----- Message from Dipti Stubbs sent at 8/28/2023 12:40 PM CDT -----  Regarding: Herbie JIMENEZ PT         Pt is requesting a refill on motion patches and fever blister ointment.   [facility] in [place]  Pt # [phone]

## 2023-08-30 ENCOUNTER — TELEPHONE (OUTPATIENT)
Dept: INTERNAL MEDICINE | Facility: CLINIC | Age: 63
End: 2023-08-30

## 2023-08-30 NOTE — TELEPHONE ENCOUNTER
Pt called in stating that her insurance will not cover the acyclovir 5% ointment, however her insurance will cover the cream, pt is also requesting motion patches for a trip she is going on. Please advise. LD

## 2023-08-31 RX ORDER — AZELASTINE 1 MG/ML
2 SPRAY, METERED NASAL
Qty: 30 ML | Refills: 5 | Status: SHIPPED | OUTPATIENT
Start: 2023-08-31

## 2023-09-06 ENCOUNTER — OFFICE VISIT (OUTPATIENT)
Dept: NEPHROLOGY | Facility: CLINIC | Age: 63
End: 2023-09-06
Payer: MEDICAID

## 2023-09-06 VITALS
TEMPERATURE: 98 F | HEART RATE: 81 BPM | RESPIRATION RATE: 18 BRPM | HEIGHT: 62 IN | SYSTOLIC BLOOD PRESSURE: 107 MMHG | DIASTOLIC BLOOD PRESSURE: 61 MMHG | WEIGHT: 177 LBS | OXYGEN SATURATION: 98 % | BODY MASS INDEX: 32.57 KG/M2

## 2023-09-06 DIAGNOSIS — I10 PRIMARY HYPERTENSION: Chronic | ICD-10-CM

## 2023-09-06 DIAGNOSIS — Z23 FLU VACCINE NEED: ICD-10-CM

## 2023-09-06 DIAGNOSIS — N18.2 CKD STAGE G2/A1, GFR 60-89 AND ALBUMIN CREATININE RATIO <30 MG/G: Primary | ICD-10-CM

## 2023-09-06 PROCEDURE — 3066F NEPHROPATHY DOC TX: CPT | Mod: CPTII,,, | Performed by: NURSE PRACTITIONER

## 2023-09-06 PROCEDURE — 3046F PR MOST RECENT HEMOGLOBIN A1C LEVEL > 9.0%: ICD-10-PCS | Mod: CPTII,,, | Performed by: NURSE PRACTITIONER

## 2023-09-06 PROCEDURE — 3078F DIAST BP <80 MM HG: CPT | Mod: CPTII,,, | Performed by: NURSE PRACTITIONER

## 2023-09-06 PROCEDURE — 99215 OFFICE O/P EST HI 40 MIN: CPT | Mod: PBBFAC | Performed by: NURSE PRACTITIONER

## 2023-09-06 PROCEDURE — 99213 PR OFFICE/OUTPT VISIT, EST, LEVL III, 20-29 MIN: ICD-10-PCS | Mod: S$PBB,,, | Performed by: NURSE PRACTITIONER

## 2023-09-06 PROCEDURE — 4010F PR ACE/ARB THEARPY RXD/TAKEN: ICD-10-PCS | Mod: CPTII,,, | Performed by: NURSE PRACTITIONER

## 2023-09-06 PROCEDURE — 3078F PR MOST RECENT DIASTOLIC BLOOD PRESSURE < 80 MM HG: ICD-10-PCS | Mod: CPTII,,, | Performed by: NURSE PRACTITIONER

## 2023-09-06 PROCEDURE — 3074F SYST BP LT 130 MM HG: CPT | Mod: CPTII,,, | Performed by: NURSE PRACTITIONER

## 2023-09-06 PROCEDURE — 1159F PR MEDICATION LIST DOCUMENTED IN MEDICAL RECORD: ICD-10-PCS | Mod: CPTII,,, | Performed by: NURSE PRACTITIONER

## 2023-09-06 PROCEDURE — 3061F NEG MICROALBUMINURIA REV: CPT | Mod: CPTII,,, | Performed by: NURSE PRACTITIONER

## 2023-09-06 PROCEDURE — 90686 IIV4 VACC NO PRSV 0.5 ML IM: CPT | Mod: PBBFAC

## 2023-09-06 PROCEDURE — 3066F PR DOCUMENTATION OF TREATMENT FOR NEPHROPATHY: ICD-10-PCS | Mod: CPTII,,, | Performed by: NURSE PRACTITIONER

## 2023-09-06 PROCEDURE — 3074F PR MOST RECENT SYSTOLIC BLOOD PRESSURE < 130 MM HG: ICD-10-PCS | Mod: CPTII,,, | Performed by: NURSE PRACTITIONER

## 2023-09-06 PROCEDURE — 3008F PR BODY MASS INDEX (BMI) DOCUMENTED: ICD-10-PCS | Mod: CPTII,,, | Performed by: NURSE PRACTITIONER

## 2023-09-06 PROCEDURE — 4010F ACE/ARB THERAPY RXD/TAKEN: CPT | Mod: CPTII,,, | Performed by: NURSE PRACTITIONER

## 2023-09-06 PROCEDURE — 1159F MED LIST DOCD IN RCRD: CPT | Mod: CPTII,,, | Performed by: NURSE PRACTITIONER

## 2023-09-06 PROCEDURE — 3046F HEMOGLOBIN A1C LEVEL >9.0%: CPT | Mod: CPTII,,, | Performed by: NURSE PRACTITIONER

## 2023-09-06 PROCEDURE — 3061F PR NEG MICROALBUMINURIA RESULT DOCUMENTED/REVIEW: ICD-10-PCS | Mod: CPTII,,, | Performed by: NURSE PRACTITIONER

## 2023-09-06 PROCEDURE — 3008F BODY MASS INDEX DOCD: CPT | Mod: CPTII,,, | Performed by: NURSE PRACTITIONER

## 2023-09-06 PROCEDURE — 99213 OFFICE O/P EST LOW 20 MIN: CPT | Mod: S$PBB,,, | Performed by: NURSE PRACTITIONER

## 2023-09-06 RX ORDER — SCOLOPAMINE TRANSDERMAL SYSTEM 1 MG/1
1 PATCH, EXTENDED RELEASE TRANSDERMAL
Qty: 2 PATCH | Refills: 0 | Status: SHIPPED | OUTPATIENT
Start: 2023-09-06 | End: 2023-09-12

## 2023-09-06 RX ORDER — ASPIRIN 81 MG/1
81 TABLET ORAL DAILY
COMMUNITY

## 2023-09-06 NOTE — PROGRESS NOTES
VSS, patient denies allergy to vaccine and being sick.  Influenza Vaccine administered, deltoid; patient tolerated well; advised of possible pain and to call clinic with any issues.  Patient waited the required 15 mins post administration

## 2023-09-06 NOTE — PROGRESS NOTES
"[facility]  Nephrology Clinic Note    Chief complaint: Chronic Kidney Disease (RTC, took meds, requests influenza vaccine, w/o complaints)    History of present illness:   Pedro Vergara is a 62 y.o. Black or  female with past medical history of chronic kidney disease, diabetes mellitus, hypertension, hyperlipidemia, glaucoma, and obesity. Presents for scheduled follow-up appointment in nephrology clinic.    Review of Systems  12 point review of systems conducted, negative except as stated in the history of present illness.    Allergies: Patient is allergic to adhesive tape-silicones.     Past Medical History:  has a past medical history of Anxiety, CKD (chronic kidney disease), Depression, Diabetes mellitus, type 2, HLD (hyperlipidemia), Hypertension, Obesity, unspecified, and Unspecified glaucoma.    Procedure History:  has a past surgical history that includes Carpal tunnel release; Hysterectomy; Tonsillectomy; Colonoscopy (08/22/2014); tendon sheath incision; Carpal tunnel release (Right, 07/06/2016); and Tubal ligation.    Family History: family history includes Cancer in her father and mother; Diabetes in her maternal grandmother and paternal grandmother; Hypertension in her father.    Social History:  reports that she has never smoked. She has never used smokeless tobacco. She reports that she does not currently use alcohol. She reports that she does not use drugs.    Physical exam  /61 (BP Location: Right arm, Patient Position: Sitting, BP Method: Medium (Automatic))   Pulse 81   Temp 98.2 °F (36.8 °C) (Oral)   Resp 18   Ht 5' 1.81" (1.57 m)   Wt 80.3 kg (177 lb)   SpO2 98%   BMI 32.57 kg/m²   General appearance: Patient is in no acute distress.  Skin: No rashes or wounds.  HEENT: PERRLA, EOMI, no scleral icterus, no JVD. Neck is supple.  Chest: Respirations are unlabored. Lungs sounds are clear.   Heart: S1, S2.   Abdomen: Benign.  : " "Deferred.  Extremities: No edema, peripheral pulses are palpable.   Neuro: No focal deficits.     Home Medications:    Current Outpatient Medications:     ACCU-CHEK GUIDE TEST STRIPS Strp, CHECK GLUCOSE TWICE A DAY, Disp: , Rfl:     acyclovir 5% (ZOVIRAX) 5 % ointment, APPLY TO AFFECTED AREA EVERY 4 HOURS, Disp: , Rfl:     aspirin (ECOTRIN) 81 MG EC tablet, Take 81 mg by mouth once daily., Disp: , Rfl:     atorvastatin (LIPITOR) 20 MG tablet, Take 1 tablet (20 mg total) by mouth once daily., Disp: 90 tablet, Rfl: 2    azelastine (ASTELIN) 137 mcg (0.1 %) nasal spray, USE 2 SPRAYS IN EACH NOSTRIL ONCE DAILY, Disp: 30 mL, Rfl: 5    BD NORMA 2ND GEN PEN NEEDLE 32 gauge x 5/32" Ndle, USE DAILY TO INJECT INSULIN, Disp: 100 each, Rfl: 11    BD ULTRA-FINE SHORT PEN NEEDLE 31 gauge x 5/16" Ndle, USE DAILY AS DIRECTED, Disp: , Rfl:     blood sugar diagnostic (ACCU-CHEK GUIDE TEST STRIPS [facility]),  ACCU-CHEK GUIDE TEST STRIP, See Instructions, USE ONE STRIP TO TEST BLOOD GLUCOSE TWICE DAILY, # 50 unknown unit, 3 Refill(s), Pharmacy: [facility] STORE [id number], 158, cm, Height/Length Dosing, 11/18/21 13:08:00 CST, 85.9, kg, Weight Dosing, 11/18/21 13:08:00 CST, Disp: , Rfl:     cyclobenzaprine (FLEXERIL) 10 MG tablet, Take 1 tablet (10 mg total) by mouth nightly as needed for Muscle spasms., Disp: 15 tablet, Rfl: 1    diclofenac sodium (VOLTAREN) 1 % Gel, APPLY 2 GRAMS TO AFFECTED AREA 3 TIMES A DAY, Disp: , Rfl:     DULoxetine (CYMBALTA) 60 MG capsule, Take 60 mg by mouth nightly., Disp: , Rfl:     famotidine (PEPCID) 40 MG tablet, Take 1 tablet (40 mg total) by mouth once daily., Disp: 30 tablet, Rfl: 3    fluticasone propionate (FLONASE) 50 mcg/actuation nasal spray, 2 sprays by Each Nostril route 2 (two) times daily., Disp: , Rfl:     glimepiride (AMARYL) 2 MG tablet, TAKE 1 TABLET BY MOUTH EVERY DAY WITH THE FIRST MEAL OF THE DAY, Disp: 90 tablet, Rfl: 3    insulin (LANTUS SOLOSTAR U-100 INSULIN) glargine 100 units/mL SubQ pen, Inject " "18 Units into the skin once daily., Disp: 15 each, Rfl: 6    ipratropium (ATROVENT) 21 mcg (0.03 %) nasal spray, SPRAY 1 SPRAY IN EACH NOSTRIL 4 (FOUR) TIMES DAILY AS NEEDED FOR RUNNY NOSE, Disp: 30 mL, Rfl: 5    JARDIANCE 25 mg tablet, Take 25 mg by mouth once daily., Disp: , Rfl:     lisinopriL (PRINIVIL,ZESTRIL) 5 MG tablet, Take 1 tablet (5 mg total) by mouth once daily., Disp: 90 tablet, Rfl: 2    loratadine (CLARITIN) 10 mg tablet, Take 1 tablet (10 mg total) by mouth once daily., Disp: 30 tablet, Rfl: 11    medical supply, miscellaneous (0.2 MICRON FILTER ATTACHMENT [facility]),  BD UF short Pen Needle 8MMx 31g, See Instructions, Use daily to inject insulin' Dx Code E11.9 SAMMIE 99 mon, # 100 EA, 11 Refill(s), Pharmacy: [facility]/pharmacy #[id number], 157, cm, Height/Length Dosing, 06/10/21 13:27:00 CDT, 85.4, kg, Weight Dosing, 06/10/21 1..., Disp: , Rfl:     metoprolol succinate (TOPROL-XL) 25 MG 24 hr tablet, Take 1 tablet (25 mg total) by mouth once daily., Disp: 90 tablet, Rfl: 2    mupirocin (BACTROBAN) 2 % ointment, Apply topically 3 (three) times daily., Disp: 22 g, Rfl: 1    nitroGLYCERIN (NITROSTAT) 0.4 MG SL tablet, Place 0.4 mg under the tongue., Disp: , Rfl:     pen needle, diabetic 32 gauge x 5/32" Ndle, BD yanelis insulin pen needle for once a day injections, Disp: 90 each, Rfl: 3    pregabalin (LYRICA) 75 MG capsule, Take 75 mg by mouth 3 (three) times daily., Disp: , Rfl:     tirzepatide (MOUNJARO) 5 mg/0.5 mL PnIj, Inject 5 mg into the skin every 7 days., Disp: 4 pen, Rfl: 0    tirzepatide 10 mg/0.5 mL PnIj, Inject 10 mg into the skin every 7 days., Disp: 4 pen, Rfl: 5    traZODone (DESYREL) 50 MG tablet, Take 0.5 tablets (25 mg total) by mouth nightly as needed for Insomnia., Disp: 30 tablet, Rfl: 3    tretinoin (RETIN-A) 0.05 % cream, Apply topically every evening., Disp: 45 g, Rfl: 3    fluconazole (DIFLUCAN) 100 MG tablet, Take 1 tablet (100 mg total) by mouth Every 3 (three) days., Disp: 3 tablet, Rfl: 1    " scopolamine (TRANSDERM-SCOP) 1.3-1.5 mg (1 mg over 3 days), Place 1 patch onto the skin every 72 hours. for 6 days, Disp: 2 patch, Rfl: 0    tirzepatide 7.5 mg/0.5 mL PnIj, Inject 7.5 mg into the skin every 7 days., Disp: 4 pen, Rfl: 0    Laboratory data    Serum  Lab Results   Component Value Date    WBC 8.07 06/05/2023    HGB 13.4 06/05/2023    HCT 41.4 06/05/2023     06/05/2023    IRON 98 10/27/2020    TIBC 241 (L) 10/27/2020    TRANS 210 10/27/2020    LABIRON 41 10/27/2020    FERRITIN 316.52 (H) 10/27/2020     08/29/2023    K 3.8 08/29/2023    CHLORIDE 107 08/29/2023    CO2 24 08/29/2023    BUN 14.0 08/29/2023    CREATININE 1.02 08/29/2023    EGFRNORACEVR 62 08/29/2023    GLUCOSE 147 (H) 08/29/2023    CALCIUM 9.4 08/29/2023    ALKPHOS 74 08/29/2023    LABPROT 7.4 08/29/2023    ALBUMIN 4.2 08/29/2023    BILIDIR 0.2 12/30/2021    IBILI 0.30 12/30/2021    AST 24 08/29/2023    ALT 14 08/29/2023    MG 1.80 10/16/2022    PHOS 3.3 10/27/2020      Lab Results   Component Value Date    HGBA1C 8.6 (H) 12/30/2022    PTH 35.4 10/27/2020    OYPJODBF81ZR 18.9 (L) 10/27/2020    HIV Nonreactive 06/26/2017     Urine:  Lab Results   Component Value Date    COLORUA Yellow 08/29/2023    APPEARANCEUA Clear 08/29/2023    SGUA 1.020 08/29/2023    PHUA 6.0 08/29/2023    PROTEINUA Negative 08/29/2023    GLUCOSEUA >=1000 (A) 08/29/2023    KETONESUA Trace (A) 08/29/2023    BLOODUA Negative 08/29/2023    NITRITESUA Negative 08/29/2023    LEUKOCYTESUR Negative 08/29/2023    RBCUA 0-5 12/30/2022    WBCUA 0-5 12/30/2022    BACTERIA None Seen 12/30/2022    SQEPUA Trace (A) 12/30/2022    HYALINECASTS None Seen 12/30/2022    CREATRANDUR 110.7 08/29/2023    PROTEINURINE 9.0 08/29/2023    UPROTCREA 0.1 08/29/2023         Imaging  US Retroperitoneal Limited 01/05/2021    Narrative  US Retroperitoneum Limited    REASON FOR STUDY: Chronic kidney disease    TECHNIQUE: Ultrasound evaluation of the kidneys.    COMPARISON: No relevant  comparison studies available at the time of  dictation.    FINDINGS: The right kidney measures 9.1 cm. The left kidney measures  8.6 cm. No hydronephrosis. Normal renal echogenicity.    IMPRESSION: Normal kidneys.    Electronically Signed By: Jacob Davis MD  Date/Time Signed: 01/05/2021 14:45      Impression and plan     CKD stage G2/A1, GFR 60-89 and albumin creatinine ratio <30 mg/g  -     Comprehensive Metabolic Panel; Future; Expected date: 08/20/2024  -     Protein/Creatinine Ratio, Urine; Future; Expected date: 08/20/2024  -     Urinalysis, Reflex to Urine Culture; Future; Expected date: 08/20/2024  Diabetic kidney disease. Renal indices are stable.  Continue SGLT 2 inhibitor and ACE-inhibitor.  Continue renal sparing activities.   Follow up in about 1 year (around 9/6/2024).    Primary hypertension  Blood pressure reading is at goal, continue current antihypertensive regimen and 2 g a day dietary sodium restriction.      BMI 34.0-34.9,adult  Lifestyle and dietary interventions discussed, patient encouraged to maintain non-sedentary lifestyle and well-balanced diet.     Flu vaccine need  -     Influenza - Quadrivalent *Preferred* (6 months+) (PF)    Other orders  -     scopolamine (TRANSDERM-SCOP) 1.3-1.5 mg (1 mg over 3 days); Place 1 patch onto the skin every 72 hours. for 6 days  Dispense: 2 patch; Refill: 0         9/6/2023  Karen Osborn NP  [facility] Nephrology

## 2023-09-20 DIAGNOSIS — B00.1 FEVER BLISTER: Primary | ICD-10-CM

## 2023-09-20 RX ORDER — ACYCLOVIR 50 MG/G
CREAM TOPICAL
Qty: 5 G | Refills: 1 | Status: SHIPPED | OUTPATIENT
Start: 2023-09-20 | End: 2023-10-12 | Stop reason: SDUPTHER

## 2023-09-29 ENCOUNTER — HOSPITAL ENCOUNTER (EMERGENCY)
Facility: HOSPITAL | Age: 63
Discharge: HOME | End: 2023-09-29
Payer: MEDICARE

## 2023-09-29 VITALS
HEART RATE: 74 BPM | WEIGHT: 173 LBS | TEMPERATURE: 98 F | BODY MASS INDEX: 31.83 KG/M2 | DIASTOLIC BLOOD PRESSURE: 75 MMHG | OXYGEN SATURATION: 98 % | SYSTOLIC BLOOD PRESSURE: 107 MMHG | HEIGHT: 62 IN | RESPIRATION RATE: 19 BRPM

## 2023-09-29 DIAGNOSIS — R05.9 COUGH: ICD-10-CM

## 2023-09-29 PROCEDURE — 99284 EMERGENCY DEPT VISIT MOD MDM: CPT | Mod: 25

## 2023-09-29 PROCEDURE — 63600175 PHARM REV CODE 636 W HCPCS: Performed by: NURSE PRACTITIONER

## 2023-09-29 RX ORDER — CODEINE PHOSPHATE AND GUAIFENESIN 10; 100 MG/5ML; MG/5ML
5 SOLUTION ORAL 3 TIMES DAILY PRN
Qty: 120 ML | Refills: 0 | Status: SHIPPED | OUTPATIENT
Start: 2023-09-29 | End: 2023-10-12 | Stop reason: SDUPTHER

## 2023-09-29 RX ORDER — ONDANSETRON 4 MG/1
4 TABLET, FILM COATED ORAL EVERY 6 HOURS
Qty: 12 TABLET | Refills: 0 | Status: SHIPPED | OUTPATIENT
Start: 2023-09-29 | End: 2023-10-12 | Stop reason: SDUPTHER

## 2023-09-29 RX ORDER — DEXAMETHASONE 4 MG/1
8 TABLET ORAL
Status: COMPLETED | OUTPATIENT
Start: 2023-09-29 | End: 2023-09-29

## 2023-09-29 RX ADMIN — DEXAMETHASONE 8 MG: 4 TABLET ORAL at 05:09

## 2023-09-29 NOTE — ED PROVIDER NOTES
Encounter Date: 9/29/2023       History     Chief Complaint   Patient presents with    Cough     Pt states persistent productive cough x 2 weeks. States being seen at  x 2 weeks ago and dx w/ bronchitis and given steriod shot, z pack, and tessalon pearls. Denies relief and went back to  and was placed on abx and inhalers. States no relief and has increase in amount coming up from cough.      C/o cough x 2 weeks and sob x 2 months, she has completed 2 rounds of antibiotics, last pill this morning      Review of patient's allergies indicates:   Allergen Reactions    Adhesive tape-silicones      Past Medical History:   Diagnosis Date    Anxiety     CKD (chronic kidney disease)     Depression     Diabetes mellitus, type 2     HLD (hyperlipidemia)     Hypertension     Obesity, unspecified     Unspecified glaucoma      Past Surgical History:   Procedure Laterality Date    CARPAL TUNNEL RELEASE      CARPAL TUNNEL RELEASE Right 07/06/2016    COLONOSCOPY  08/22/2014    HYSTERECTOMY      tendon sheath incision      TONSILLECTOMY      TUBAL LIGATION       Family History   Problem Relation Age of Onset    Cancer Mother     Cancer Father         Throte caner    Hypertension Father     Diabetes Paternal Grandmother     Diabetes Maternal Grandmother      Social History     Tobacco Use    Smoking status: Never    Smokeless tobacco: Never    Tobacco comments:     None   Substance Use Topics    Alcohol use: Not Currently     Comment: None    Drug use: Never     Review of Systems   Respiratory:  Positive for cough.    All other systems reviewed and are negative.      Physical Exam     Initial Vitals [09/29/23 1701]   BP Pulse Resp Temp SpO2   114/77 79 20 98.3 °F (36.8 °C) 98 %      MAP       --         Physical Exam    Nursing note and vitals reviewed.  Constitutional: She appears well-developed and well-nourished.   HENT:   Head: Normocephalic and atraumatic.   Eyes: Pupils are equal, round, and reactive to light.   Neck: Neck  supple.   Normal range of motion.  Cardiovascular:  Normal rate, regular rhythm and normal heart sounds.           Pulmonary/Chest: Breath sounds normal. No respiratory distress.   Musculoskeletal:         General: Normal range of motion.      Cervical back: Normal range of motion and neck supple.     Neurological: She is alert and oriented to person, place, and time.   Skin: Skin is warm and dry. Capillary refill takes less than 2 seconds.   Psychiatric: She has a normal mood and affect. Her behavior is normal. Judgment and thought content normal.         ED Course   Procedures  Labs Reviewed - No data to display       Imaging Results              X-Ray Chest PA And Lateral (In process)                      Medications   dexAMETHasone tablet 8 mg (8 mg Oral Given 9/29/23 8712)     Medical Decision Making  Problems Addressed:  Cough: chronic illness or injury     Details: Follow up with Pulmonary for further testing    Amount and/or Complexity of Data Reviewed  Radiology: ordered.     Details: NO ACUTE FINDINGS ON CXR    Risk  OTC drugs.  Prescription drug management.                               Clinical Impression:   Final diagnoses:  [R05.9] Cough        ED Disposition Condition    Discharge Stable          ED Prescriptions       Medication Sig Dispense Start Date End Date Auth. Provider    guaiFENesin-codeine 100-10 mg/5 ml (TUSSI-ORGANIDIN NR)  mg/5 mL syrup Take 5 mLs by mouth 3 (three) times daily as needed for Cough. 120 mL 9/29/2023 10/9/2023 Paty Romero FNP          Follow-up Information       Follow up With Specialties Details Why Contact Info    Cayla Bruno FNP Family Medicine Call  As needed [address] [facility] [id number] [phone]               Paty Romero FNP  09/29/23 1826       Paty Romero FNP  09/29/23 6617

## 2023-10-09 ENCOUNTER — LAB VISIT (OUTPATIENT)
Dept: LAB | Facility: HOSPITAL | Age: 63
End: 2023-10-09
Attending: NURSE PRACTITIONER
Payer: MEDICAID

## 2023-10-09 DIAGNOSIS — E78.2 MIXED HYPERLIPIDEMIA: Chronic | ICD-10-CM

## 2023-10-09 DIAGNOSIS — N18.2 CKD STAGE G2/A1, GFR 60-89 AND ALBUMIN CREATININE RATIO <30 MG/G: ICD-10-CM

## 2023-10-09 LAB
ALBUMIN SERPL-MCNC: 4.5 G/DL (ref 3.4–5)
ALBUMIN/GLOB SERPL: 1.5 RATIO
ALP SERPL-CCNC: 69 UNIT/L (ref 50–144)
ALT SERPL-CCNC: 19 UNIT/L (ref 1–45)
ANION GAP SERPL CALC-SCNC: 7 MEQ/L (ref 2–13)
APPEARANCE UR: CLEAR
AST SERPL-CCNC: 25 UNIT/L (ref 14–36)
BILIRUB SERPL-MCNC: 0.6 MG/DL (ref 0–1)
BILIRUB UR QL STRIP.AUTO: NEGATIVE
BUN SERPL-MCNC: 8 MG/DL (ref 7–20)
CALCIUM SERPL-MCNC: 9.5 MG/DL (ref 8.4–10.2)
CHLORIDE SERPL-SCNC: 106 MMOL/L (ref 98–110)
CHOLEST SERPL-MCNC: 165 MG/DL (ref 0–200)
CO2 SERPL-SCNC: 29 MMOL/L (ref 21–32)
COLOR UR AUTO: YELLOW
CREAT SERPL-MCNC: 0.95 MG/DL (ref 0.66–1.25)
CREAT UR-MCNC: 104.1 MG/DL
CREAT/UREA NIT SERPL: 8 (ref 12–20)
GFR SERPLBLD CREATININE-BSD FMLA CKD-EPI: 67 MLS/MIN/1.73/M2
GLOBULIN SER-MCNC: 3.1 GM/DL (ref 2–3.9)
GLUCOSE SERPL-MCNC: 146 MG/DL (ref 70–115)
GLUCOSE UR QL STRIP.AUTO: >=1000
HDLC SERPL-MCNC: 63 MG/DL (ref 40–60)
KETONES UR QL STRIP.AUTO: NEGATIVE
LDLC SERPL DIRECT ASSAY-SCNC: 77 MG/DL (ref 30–100)
LEUKOCYTE ESTERASE UR QL STRIP.AUTO: NEGATIVE
NITRITE UR QL STRIP.AUTO: NEGATIVE
PH UR STRIP.AUTO: 5.5 [PH]
POTASSIUM SERPL-SCNC: 3.8 MMOL/L (ref 3.5–5.1)
PROT SERPL-MCNC: 7.6 GM/DL (ref 6.3–8.2)
PROT UR QL STRIP.AUTO: NEGATIVE
PROT UR STRIP-MCNC: 6 MG/DL
RBC UR QL AUTO: NEGATIVE
SODIUM SERPL-SCNC: 142 MMOL/L (ref 135–145)
SP GR UR STRIP.AUTO: 1.01 (ref 1–1.03)
TRIGL SERPL-MCNC: 116 MG/DL (ref 30–200)
URINE PROTEIN/CREATININE RATIO (OHS): 0.1
UROBILINOGEN UR STRIP-ACNC: 0.2

## 2023-10-09 PROCEDURE — 36415 COLL VENOUS BLD VENIPUNCTURE: CPT

## 2023-10-09 PROCEDURE — 80053 COMPREHEN METABOLIC PANEL: CPT

## 2023-10-09 PROCEDURE — 80061 LIPID PANEL: CPT

## 2023-10-09 PROCEDURE — 82570 ASSAY OF URINE CREATININE: CPT

## 2023-10-09 PROCEDURE — 81003 URINALYSIS AUTO W/O SCOPE: CPT

## 2023-10-10 ENCOUNTER — OFFICE VISIT (OUTPATIENT)
Dept: OTOLARYNGOLOGY | Facility: CLINIC | Age: 63
End: 2023-10-10
Payer: MEDICAID

## 2023-10-10 VITALS — DIASTOLIC BLOOD PRESSURE: 74 MMHG | SYSTOLIC BLOOD PRESSURE: 110 MMHG | HEART RATE: 61 BPM | TEMPERATURE: 99 F

## 2023-10-10 DIAGNOSIS — H90.3 SENSORINEURAL HEARING LOSS (SNHL) OF BOTH EARS: ICD-10-CM

## 2023-10-10 DIAGNOSIS — J31.0 CHRONIC RHINITIS: ICD-10-CM

## 2023-10-10 DIAGNOSIS — J30.9 ALLERGIC RHINITIS, UNSPECIFIED SEASONALITY, UNSPECIFIED TRIGGER: Primary | ICD-10-CM

## 2023-10-10 PROCEDURE — 1159F MED LIST DOCD IN RCRD: CPT | Mod: CPTII,,, | Performed by: NURSE PRACTITIONER

## 2023-10-10 PROCEDURE — 3078F DIAST BP <80 MM HG: CPT | Mod: CPTII,,, | Performed by: NURSE PRACTITIONER

## 2023-10-10 PROCEDURE — 3061F NEG MICROALBUMINURIA REV: CPT | Mod: CPTII,,, | Performed by: NURSE PRACTITIONER

## 2023-10-10 PROCEDURE — 3046F HEMOGLOBIN A1C LEVEL >9.0%: CPT | Mod: CPTII,,, | Performed by: NURSE PRACTITIONER

## 2023-10-10 PROCEDURE — 3066F NEPHROPATHY DOC TX: CPT | Mod: CPTII,,, | Performed by: NURSE PRACTITIONER

## 2023-10-10 PROCEDURE — 3046F PR MOST RECENT HEMOGLOBIN A1C LEVEL > 9.0%: ICD-10-PCS | Mod: CPTII,,, | Performed by: NURSE PRACTITIONER

## 2023-10-10 PROCEDURE — 99213 PR OFFICE/OUTPT VISIT, EST, LEVL III, 20-29 MIN: ICD-10-PCS | Mod: S$PBB,,, | Performed by: NURSE PRACTITIONER

## 2023-10-10 PROCEDURE — 99215 OFFICE O/P EST HI 40 MIN: CPT | Mod: PBBFAC | Performed by: NURSE PRACTITIONER

## 2023-10-10 PROCEDURE — 3074F SYST BP LT 130 MM HG: CPT | Mod: CPTII,,, | Performed by: NURSE PRACTITIONER

## 2023-10-10 PROCEDURE — 4010F PR ACE/ARB THEARPY RXD/TAKEN: ICD-10-PCS | Mod: CPTII,,, | Performed by: NURSE PRACTITIONER

## 2023-10-10 PROCEDURE — 3074F PR MOST RECENT SYSTOLIC BLOOD PRESSURE < 130 MM HG: ICD-10-PCS | Mod: CPTII,,, | Performed by: NURSE PRACTITIONER

## 2023-10-10 PROCEDURE — 1159F PR MEDICATION LIST DOCUMENTED IN MEDICAL RECORD: ICD-10-PCS | Mod: CPTII,,, | Performed by: NURSE PRACTITIONER

## 2023-10-10 PROCEDURE — 3078F PR MOST RECENT DIASTOLIC BLOOD PRESSURE < 80 MM HG: ICD-10-PCS | Mod: CPTII,,, | Performed by: NURSE PRACTITIONER

## 2023-10-10 PROCEDURE — 4010F ACE/ARB THERAPY RXD/TAKEN: CPT | Mod: CPTII,,, | Performed by: NURSE PRACTITIONER

## 2023-10-10 PROCEDURE — 99213 OFFICE O/P EST LOW 20 MIN: CPT | Mod: S$PBB,,, | Performed by: NURSE PRACTITIONER

## 2023-10-10 PROCEDURE — 3061F PR NEG MICROALBUMINURIA RESULT DOCUMENTED/REVIEW: ICD-10-PCS | Mod: CPTII,,, | Performed by: NURSE PRACTITIONER

## 2023-10-10 PROCEDURE — 3066F PR DOCUMENTATION OF TREATMENT FOR NEPHROPATHY: ICD-10-PCS | Mod: CPTII,,, | Performed by: NURSE PRACTITIONER

## 2023-10-10 NOTE — PROGRESS NOTES
"[facility]  Otolaryngology Clinic Note    Pedro Vergara  YOB: 1960    Chief Complaint: f/u    HPI: 10/27/21: 60yoF referred for epistaxis. She reports nosebleeds are always from right nostril and occur 3-4x/week that resolve spontaneously with pressure. She also occasionally has bleeds during the night and will wake up with blood on her clothes and bedding. She takes baby asa daily. She endorses occasional nasal congestion and frequent clear rhinorrhea. C/o frequent perioral fever blister outbreaks recently.     11/18/21: 60yoF here for f/u of epistaxis. Also c/o recent rhinorrhea and headache. Still reporting nosebleeds but frequency has decreased to 2x/week. Still reporting bleeds during the night and will wake up with blood on her clothes and bedding. She also endorses occasional nasal congestion and frequent clear rhinorrhea with associated headache. Denies congestion today, dry quality to nasal cavity, rhinotillexomania, or using the heater accessibly. She is using saline gel spray as directed at the previous visit, but did not take Allegra. She takes baby asa daily and endorses bruising.     01/06/2023: 62yoF referred for dizziness. She reports this began acutely in November. It occurs multiple times daily and lasts 2 min or less. She describes this as room spinning and feeling as if she is on a merry go round. Denies prescyncope. She has to hold on to the wall to make it to the bathroom. States this occurs when she changes position such as rising as well as turning over in the bed. She has [facility] and received meclizine which helps but does not completely resolve symptoms. She also c/o rhinitis, nasal congestion, aural pressure, & ear popping. States "I feel like a little kid with a snotty nose all the time." Denies sneezing or itchy/watery eyes. Denies HL or tinnitus. Denies any previous otologic hx, procedures, or surgeries. Denies medication changes apart from " meclizine. She uses zyrtec daily and astelin BID with minimal effective. Denies head trauma.      02/22/2023: Reports dizziness has resolved following Epley. Continues to have bothersome rhinitis, though it has improved with zyrtec & atrovent. She feels that constantly wiping her nose is causing fever blister outbreaks under her nose. Occasional popping & discomfort to right ear. She is having carpet removed from her home today.      4/17/23: Reports rhinitis has improved, however, it is still bothersome. Blowing her nose frequently is causing her to have fever blisters under her nose. She was unsure if she could use tissues with aloe. She is using the atrovent QID. Having carpet removed also helped. C/o some continued discomfort and popping to right ear.     5/8/23: Continues to be bothered by rhinitis & intermittent sharp right otalgia. She is using flonase and atrovent BID as well as claritin and zyrtec daily. Denies hearing change. Admits to hx of TMJ and having her jaw lock up a few times, but she thought that had been better for the past several years. She is no longer wearing  and admits to eating ice frequently. Has not had any recent dizziness.     10/10/2023: States she is trying to get over bronchitis. Her ears have felt achy for the past few days. Felt increasing atrovent helped but that her nose still runs. Has MQT testing scheduled with allergist on Monday.    ROS:   10-point review of systems negative except per HPI      Review of patient's allergies indicates:   Allergen Reactions    Adhesive tape-silicones        Past Medical History:   Diagnosis Date    Anxiety     CKD (chronic kidney disease)     Depression     Diabetes mellitus, type 2     HLD (hyperlipidemia)     Hypertension     Obesity, unspecified     Unspecified glaucoma        Past Surgical History:   Procedure Laterality Date    CARPAL TUNNEL RELEASE      CARPAL TUNNEL RELEASE Right 07/06/2016    COLONOSCOPY  08/22/2014     "HYSTERECTOMY      tendon sheath incision      TONSILLECTOMY      TUBAL LIGATION         Social History     Socioeconomic History    Marital status: Single   Tobacco Use    Smoking status: Never    Smokeless tobacco: Never    Tobacco comments:     None   Substance and Sexual Activity    Alcohol use: Not Currently     Comment: None    Drug use: Never    Sexual activity: Not Currently     Birth control/protection: None     Social Determinants of Health     Physical Activity: Inactive (8/4/2022)    Exercise Vital Sign     Days of Exercise per Week: 0 days     Minutes of Exercise per Session: 0 min       Family History   Problem Relation Age of Onset    Cancer Mother     Cancer Father         Throte caner    Hypertension Father     Diabetes Paternal Grandmother     Diabetes Maternal Grandmother        Outpatient Encounter Medications as of 10/10/2023   Medication Sig Dispense Refill    ACCU-CHEK GUIDE TEST STRIPS Strp CHECK GLUCOSE TWICE A DAY      acyclovir 5% (ZOVIRAX) 5 % Crea Apply topically 5 (five) times daily. 5 g 1    aspirin (ECOTRIN) 81 MG EC tablet Take 81 mg by mouth once daily.      atorvastatin (LIPITOR) 20 MG tablet Take 1 tablet (20 mg total) by mouth once daily. 90 tablet 2    azelastine (ASTELIN) 137 mcg (0.1 %) nasal spray USE 2 SPRAYS IN EACH NOSTRIL ONCE DAILY 30 mL 5    BD NORMA 2ND GEN PEN NEEDLE 32 gauge x 5/32" Ndle USE DAILY TO INJECT INSULIN 100 each 11    BD ULTRA-FINE SHORT PEN NEEDLE 31 gauge x 5/16" Ndle USE DAILY AS DIRECTED      blood sugar diagnostic (ACCU-CHEK GUIDE TEST STRIPS MISC)   ACCU-CHEK GUIDE TEST STRIP, See Instructions, USE ONE STRIP TO TEST BLOOD GLUCOSE TWICE DAILY, # 50 unknown unit, 3 Refill(s), Pharmacy: [facility] STORE [id number], 158, cm, Height/Length Dosing, 11/18/21 13:08:00 CST, 85.9, kg, Weight Dosing, 11/18/21 13:08:00 CST      cyclobenzaprine (FLEXERIL) 10 MG tablet Take 1 tablet (10 mg total) by mouth nightly as needed for Muscle spasms. 15 tablet 1    diclofenac sodium " (VOLTAREN) 1 % Gel APPLY 2 GRAMS TO AFFECTED AREA 3 TIMES A DAY      DULoxetine (CYMBALTA) 60 MG capsule Take 60 mg by mouth nightly.      famotidine (PEPCID) 40 MG tablet Take 1 tablet (40 mg total) by mouth once daily. 30 tablet 3    fluconazole (DIFLUCAN) 100 MG tablet Take 1 tablet (100 mg total) by mouth Every 3 (three) days. 3 tablet 1    fluticasone propionate (FLONASE) 50 mcg/actuation nasal spray 2 sprays by Each Nostril route 2 (two) times daily.      glimepiride (AMARYL) 2 MG tablet TAKE 1 TABLET BY MOUTH EVERY DAY WITH THE FIRST MEAL OF THE DAY 90 tablet 3    [] guaiFENesin-codeine 100-10 mg/5 ml (TUSSI-ORGANIDIN NR)  mg/5 mL syrup Take 5 mLs by mouth 3 (three) times daily as needed for Cough. 120 mL 0    insulin (LANTUS SOLOSTAR U-100 INSULIN) glargine 100 units/mL SubQ pen Inject 18 Units into the skin once daily. 15 each 6    ipratropium (ATROVENT) 21 mcg (0.03 %) nasal spray SPRAY 1 SPRAY IN EACH NOSTRIL 4 (FOUR) TIMES DAILY AS NEEDED FOR RUNNY NOSE 30 mL 5    JARDIANCE 25 mg tablet Take 25 mg by mouth once daily.      lisinopriL (PRINIVIL,ZESTRIL) 5 MG tablet Take 1 tablet (5 mg total) by mouth once daily. 90 tablet 2    loratadine (CLARITIN) 10 mg tablet Take 1 tablet (10 mg total) by mouth once daily. 30 tablet 11    medical supply, miscellaneous (0.2 MICRON FILTER ATTACHMENT MISC)   BD UF short Pen Needle 8MMx 31g, See Instructions, Use daily to inject insulin' Dx Code E11.9 SAMMIE 99 mon, # 100 EA, 11 Refill(s), Pharmacy: [facility]/pharmacy #[id number], 157, cm, Height/Length Dosing, 06/10/21 13:27:00 CDT, 85.4, kg, Weight Dosing, 06/10/21 1...      metoprolol succinate (TOPROL-XL) 25 MG 24 hr tablet Take 1 tablet (25 mg total) by mouth once daily. 90 tablet 2    mupirocin (BACTROBAN) 2 % ointment Apply topically 3 (three) times daily. 22 g 1    nitroGLYCERIN (NITROSTAT) 0.4 MG SL tablet Place 0.4 mg under the tongue.      ondansetron (ZOFRAN) 4 MG tablet Take 1 tablet (4 mg total) by mouth every  "6 (six) hours. 12 tablet 0    pen needle, diabetic 32 gauge x 5/32" Ndle BD yanelis insulin pen needle for once a day injections 90 each 3    pregabalin (LYRICA) 75 MG capsule Take 75 mg by mouth 3 (three) times daily.      [] scopolamine (TRANSDERM-SCOP) 1.3-1.5 mg (1 mg over 3 days) Place 1 patch onto the skin every 72 hours. for 6 days 2 patch 0    tirzepatide (MOUNJARO) 5 mg/0.5 mL PnIj Inject 5 mg into the skin every 7 days. 4 pen 0    tirzepatide 10 mg/0.5 mL PnIj Inject 10 mg into the skin every 7 days. 4 pen 5    tirzepatide 7.5 mg/0.5 mL PnIj Inject 7.5 mg into the skin every 7 days. 4 pen 0    traZODone (DESYREL) 50 MG tablet Take 0.5 tablets (25 mg total) by mouth nightly as needed for Insomnia. 30 tablet 3    tretinoin (RETIN-A) 0.05 % cream Apply topically every evening. 45 g 3    [DISCONTINUED] acyclovir 5% (ZOVIRAX) 5 % ointment APPLY TO AFFECTED AREA EVERY 4 HOURS       No facility-administered encounter medications on file as of 10/10/2023.       Physical Exam:  There were no vitals filed for this visit.    General: NAD, voice normal  Neuro: AAO, CN II - XII grossly intact  Head/ Face: NCAT, symmetric, sensations intact bilaterally.   Eyes: EOMI, PERRL  Ears: externally normal with grossly normal hearing  AD: EAC patent, TM intact, no middle ear effusion, no retractions  AS: EAC patent, TM intact, no middle ear effusion, no retractions  Nose: bilateral nares patent, midline septum, clear rhinorrhea, pale/boggy mucosa, no external deformity, +ITH  OC/OP: MMM, no intraoral lesions, no trismus, dentition is moderate, no uvular deviation, bilaterally symmetric soft palate elevation, palatoglossus and palatopharyngeal fold wnl; tonsils are symmetric and 1+  Indirect laryngoscopy: deferred due to patient intolerance  Neck: soft, supple, no LAD, normal ROM, no thyromegaly  Respiratory: nonlabored, no wheezing, bilateral chest rise  Cardiovascular: RRR  Gastrointestinal: S NT ND  Skin: warm, no " lesions  Musculoskeletal: 5/5 strength  Psych: Appropriate affect/mood      Pertinent Data:  ? LABS:  ? AUDIO:           ? PATH:      Imaging:   I personally reviewed the following images:        Assessment/Plan:  62 y.o. female with right BPPV (resolved), AR, chronic rhinitis. Audio with b/l HFSNHL. Hx of TMJ. RAST with positives to dust and juniper. Reassurance provided that there are no signs of otologic infection or MOHINI. Ok to modify meds per allergist recs.   - Annual audio  - NSI 1-2x/day  - Continue zyrtec Qam  - Benadryl Qpm   - Continue flonase BID  - Continue Atrovent 3-4x/day prn  - Humidifier  - Continue conservative mgmt of TMJ  - RTC 4-6mo     Mable Alcocer NP

## 2023-10-12 ENCOUNTER — OFFICE VISIT (OUTPATIENT)
Dept: INTERNAL MEDICINE | Facility: CLINIC | Age: 63
End: 2023-10-12
Payer: MEDICAID

## 2023-10-12 VITALS
SYSTOLIC BLOOD PRESSURE: 106 MMHG | WEIGHT: 174 LBS | HEART RATE: 68 BPM | RESPIRATION RATE: 20 BRPM | DIASTOLIC BLOOD PRESSURE: 75 MMHG | TEMPERATURE: 98 F | HEIGHT: 62 IN | OXYGEN SATURATION: 100 % | BODY MASS INDEX: 32.02 KG/M2

## 2023-10-12 DIAGNOSIS — B00.1 FEVER BLISTER: ICD-10-CM

## 2023-10-12 DIAGNOSIS — E78.2 MIXED HYPERLIPIDEMIA: Chronic | ICD-10-CM

## 2023-10-12 DIAGNOSIS — E66.09 CLASS 1 OBESITY DUE TO EXCESS CALORIES WITH SERIOUS COMORBIDITY AND BODY MASS INDEX (BMI) OF 31.0 TO 31.9 IN ADULT: Chronic | ICD-10-CM

## 2023-10-12 DIAGNOSIS — M79.621 PAIN IN RIGHT AXILLA: Primary | ICD-10-CM

## 2023-10-12 DIAGNOSIS — I10 PRIMARY HYPERTENSION: Chronic | ICD-10-CM

## 2023-10-12 DIAGNOSIS — Z79.4 TYPE 2 DIABETES MELLITUS WITHOUT COMPLICATION, WITH LONG-TERM CURRENT USE OF INSULIN: Chronic | ICD-10-CM

## 2023-10-12 DIAGNOSIS — E11.9 TYPE 2 DIABETES MELLITUS WITHOUT COMPLICATION, WITH LONG-TERM CURRENT USE OF INSULIN: Chronic | ICD-10-CM

## 2023-10-12 PROCEDURE — 1159F MED LIST DOCD IN RCRD: CPT | Mod: CPTII,,, | Performed by: NURSE PRACTITIONER

## 2023-10-12 PROCEDURE — 3061F PR NEG MICROALBUMINURIA RESULT DOCUMENTED/REVIEW: ICD-10-PCS | Mod: CPTII,,, | Performed by: NURSE PRACTITIONER

## 2023-10-12 PROCEDURE — 1159F PR MEDICATION LIST DOCUMENTED IN MEDICAL RECORD: ICD-10-PCS | Mod: CPTII,,, | Performed by: NURSE PRACTITIONER

## 2023-10-12 PROCEDURE — 4010F ACE/ARB THERAPY RXD/TAKEN: CPT | Mod: CPTII,,, | Performed by: NURSE PRACTITIONER

## 2023-10-12 PROCEDURE — 99214 PR OFFICE/OUTPT VISIT, EST, LEVL IV, 30-39 MIN: ICD-10-PCS | Mod: S$PBB,,, | Performed by: NURSE PRACTITIONER

## 2023-10-12 PROCEDURE — 3066F NEPHROPATHY DOC TX: CPT | Mod: CPTII,,, | Performed by: NURSE PRACTITIONER

## 2023-10-12 PROCEDURE — 3078F DIAST BP <80 MM HG: CPT | Mod: CPTII,,, | Performed by: NURSE PRACTITIONER

## 2023-10-12 PROCEDURE — 3061F NEG MICROALBUMINURIA REV: CPT | Mod: CPTII,,, | Performed by: NURSE PRACTITIONER

## 2023-10-12 PROCEDURE — 4010F PR ACE/ARB THEARPY RXD/TAKEN: ICD-10-PCS | Mod: CPTII,,, | Performed by: NURSE PRACTITIONER

## 2023-10-12 PROCEDURE — 3078F PR MOST RECENT DIASTOLIC BLOOD PRESSURE < 80 MM HG: ICD-10-PCS | Mod: CPTII,,, | Performed by: NURSE PRACTITIONER

## 2023-10-12 PROCEDURE — 1160F RVW MEDS BY RX/DR IN RCRD: CPT | Mod: CPTII,,, | Performed by: NURSE PRACTITIONER

## 2023-10-12 PROCEDURE — 3066F PR DOCUMENTATION OF TREATMENT FOR NEPHROPATHY: ICD-10-PCS | Mod: CPTII,,, | Performed by: NURSE PRACTITIONER

## 2023-10-12 PROCEDURE — 3008F PR BODY MASS INDEX (BMI) DOCUMENTED: ICD-10-PCS | Mod: CPTII,,, | Performed by: NURSE PRACTITIONER

## 2023-10-12 PROCEDURE — 3046F HEMOGLOBIN A1C LEVEL >9.0%: CPT | Mod: CPTII,,, | Performed by: NURSE PRACTITIONER

## 2023-10-12 PROCEDURE — 1160F PR REVIEW ALL MEDS BY PRESCRIBER/CLIN PHARMACIST DOCUMENTED: ICD-10-PCS | Mod: CPTII,,, | Performed by: NURSE PRACTITIONER

## 2023-10-12 PROCEDURE — 3008F BODY MASS INDEX DOCD: CPT | Mod: CPTII,,, | Performed by: NURSE PRACTITIONER

## 2023-10-12 PROCEDURE — 99215 OFFICE O/P EST HI 40 MIN: CPT | Mod: PBBFAC | Performed by: NURSE PRACTITIONER

## 2023-10-12 PROCEDURE — 99214 OFFICE O/P EST MOD 30 MIN: CPT | Mod: S$PBB,,, | Performed by: NURSE PRACTITIONER

## 2023-10-12 PROCEDURE — 3046F PR MOST RECENT HEMOGLOBIN A1C LEVEL > 9.0%: ICD-10-PCS | Mod: CPTII,,, | Performed by: NURSE PRACTITIONER

## 2023-10-12 PROCEDURE — 3074F PR MOST RECENT SYSTOLIC BLOOD PRESSURE < 130 MM HG: ICD-10-PCS | Mod: CPTII,,, | Performed by: NURSE PRACTITIONER

## 2023-10-12 PROCEDURE — 3074F SYST BP LT 130 MM HG: CPT | Mod: CPTII,,, | Performed by: NURSE PRACTITIONER

## 2023-10-12 RX ORDER — CODEINE PHOSPHATE AND GUAIFENESIN 10; 100 MG/5ML; MG/5ML
5 SOLUTION ORAL 3 TIMES DAILY PRN
Qty: 120 ML | Refills: 0 | Status: SHIPPED | OUTPATIENT
Start: 2023-10-12 | End: 2023-10-22

## 2023-10-12 RX ORDER — LISINOPRIL 5 MG/1
5 TABLET ORAL DAILY
Qty: 90 TABLET | Refills: 2 | Status: SHIPPED | OUTPATIENT
Start: 2023-10-12 | End: 2024-02-15 | Stop reason: SDUPTHER

## 2023-10-12 RX ORDER — METOPROLOL SUCCINATE 25 MG/1
25 TABLET, EXTENDED RELEASE ORAL DAILY
Qty: 90 TABLET | Refills: 2 | Status: SHIPPED | OUTPATIENT
Start: 2023-10-12 | End: 2024-02-15 | Stop reason: SDUPTHER

## 2023-10-12 RX ORDER — ATORVASTATIN CALCIUM 20 MG/1
20 TABLET, FILM COATED ORAL DAILY
Qty: 90 TABLET | Refills: 2 | Status: SHIPPED | OUTPATIENT
Start: 2023-10-12 | End: 2024-02-15 | Stop reason: SDUPTHER

## 2023-10-12 RX ORDER — ACYCLOVIR 50 MG/G
CREAM TOPICAL
Qty: 5 G | Refills: 1 | Status: SHIPPED | OUTPATIENT
Start: 2023-10-12

## 2023-10-12 RX ORDER — ONDANSETRON 4 MG/1
4 TABLET, FILM COATED ORAL EVERY 6 HOURS
Qty: 12 TABLET | Refills: 0 | Status: SHIPPED | OUTPATIENT
Start: 2023-10-12 | End: 2024-03-12 | Stop reason: ALTCHOICE

## 2023-10-12 NOTE — PROGRESS NOTES
Patient ID: [id number]     Chief Complaint: Follow-up (Pain to arm pits)    HPI:     Pedro Vergara is a 62 y.o. female with diagnosis of HTN, HLD, DM2, CKD, Obesity. Patient seen today for follow up. Patient last seen in clinic on 06/12/2023.   Patient seen in ED on 09/29/2023 for cough x2 weeks. CXR indicated chronic changes, no acute findings. Patient prescribed Guaifenesin-Codeine tid prn and discharged home. States cough medication helped but still having cough. Patient denies any SOB, fever, chest pain, night sweats.   Patient also states pain to bilateral axilla. States pain intermittent, feels sharp in nature. Patient denies swelling, bumps, injury.   Patient denies any other acute complaints.     Patient followed by ENT Clinic. Last appointment on 10/10/2023. 62 y.o. female with right BPPV (resolved), AR, chronic rhinitis. Audio with b/l HFSNHL. Hx of TMJ. RAST with positives to dust and juniper. Reassurance provided that there are no signs of otologic infection or MOHINI. Ok to modify meds per allergist recs. Annual audio. NSI 1-2x/day. Continue zyrtec Qam. Benadryl Qpm. Continue flonase BID. Continue Atrovent 3-4x/day prn. Humidifier. Continue conservative mgmt of TMJ. Patient has follow up appointment scheduled for 03/12/2024.     Patient followed by Nephrology Clinic. Last appointment on 09/06/2023. CKD stage G2/A1, GFR 60-89 and albumin creatinine ratio <30 mg/g: Diabetic kidney disease. Renal indices are stable. Continue SGLT 2 inhibitor and ACE-inhibitor. Continue renal sparing activities. Follow up in about 1 year (around 9/6/2024). Patient has follow up appointment scheduled for 09/09/2024.    Patient followed by Endocrinology Clinic. Last appointment on 06/01/2023. Uncontrolled type 2 diabetes mellitus with hyperglycemia: Current A1C 10.9, previous  8.4. Urine Micro Creatine/ratio: 08/04/2022 normal. Medications: Glimepiride 2 mg Qday, Jardiance 25mg Qday, Lantus 12 units daily, Trulicity 1.5mg.  Changes: Increase Lantus 18 units, Change Trulciity to Monjaro 5 mg x 4 weeks , then increase to 7.5 mg x 4 week then 10 mg q week. Eye Exam: 11/14/2022. Foot Exam: 08/04/2022. Home Glucometer Use: Once a day. Last Hypoglycemic episode: None. Follow ADA diet, avoid soda, simple sweets, and limit rice, breads and starches. Maintain healthy weight, exercise 4-5 times a week for 30 minutes. Diet and medication compliance discussed on visit. Patient has follow up appointment scheduled for 11/02/2023.     Patient followed by Orthopedic Clinic. Last appointment on 03/27/2023. Primary osteoarthritis of right knee: Dx: right knee osteoarthritis - New problem. Treatment Plan: Discussed with patient diagnosis, prognosis, and treatment recommendations. Education provided. Conservative treatment plan discussed. Imaging: prior radiological studies independently reviewed; discussed with patient; agree with radiologist interpretation. Weight Management: is paramount. recommend at least 10% of total body weight loss if your bmi is 30-34.9. A BMI 24.9 or less may provide further relief. Procedure: Discussed CSI/VSI as treatment options; She request VSI. She has tried over the counter tylenol and Voltaren so far, but  has not yet had a trial of HEP/PT. Submit for PA for VS. Activity: Activity as tolerated; HEP to include aerobic conditioning and strength training with non-painful activity. ROM/STG exercises. Proper footware; assistive devises to avoid limping. Therapy: HEP/Physical Therapy. Medication: CONTINUE Voltaren gel, recommended tylenol 1000mg TID x 3 day. Avoid NSAIDs due to CKD. RTC: PRN; call if any issues.      Patient states followed Dr. Reymundo Avery, Podiatrist in [place].      Patient states followed by Medical Clinic in [place] eye exams.      Patient seen by Cardiology Clinic for abnormal stress test. Last appointment on 07/09/2021. She completed an echocardiogram on 6.3.21 that revealed an intact ejection  fraction approximately 55% (see full report below). The patient had a Lexiscan stress test on 4.5.21 that revealed possible apical ischemia.  She had a subsequent LHC on 6.7.21 that revealed normal coronaries arteries. Continue ASA, Lipitor, lisinopril, and SL nitro prn. Follow Patient was to follow up in Cardiology clinic in 5 months, no follow up noted. Follow up with PCP as directed    Review of patient's allergies indicates:   Allergen Reactions    Adhesive tape-silicones      Breast Cancer Screening: MMG negative on 01/11/2023  Cervical Cancer Screening: Hysterectomy  Colorectal Cancer Screening: Colonoscopy on 08/22/2014 with recommended repeat in 5 years; Colonoscopy scheduled for 01/18/2024  Diabetic Eye Exam: 11/14/2022  Diabetic Foot Exam: 08/04/2022  Lung Cancer Screening: N/A  Prostate Cancer Screening: N/A  AAA Screening: N/A  Osteoporosis Screening: deferred due to age  Medicare Wellness: N/A  Immunizations:   Immunization History   Administered Date(s) Administered    COVID-19, MRNA, LN-S, PF (Pfizer) (Gray Cap) 07/06/2022    COVID-19, MRNA, LN-S, PF (Pfizer) (Purple Cap) 02/10/2021, 03/03/2021, 10/11/2021    COVID-19, mRNA, LNP-S, bivalent booster, PF (PFIZER OMICRON) 11/01/2022    Hepatitis A / Hepatitis B 07/06/2022, 11/14/2022    Influenza 01/23/2013, 02/26/2014, 10/22/2014    Influenza (FLUBLOK) - Quadrivalent - Recombinant - PF *Preferred* (egg allergy) 10/02/2022    Influenza - Quadrivalent - MDCK - PF 10/18/2018, 10/06/2020    Influenza - Quadrivalent - PF *Preferred* (6 months and older) 09/13/2016, 09/21/2017, 11/10/2019, 09/25/2021, 09/06/2023    Influenza - Trivalent - PF (ADULT) 11/21/2015, 09/13/2016, 09/21/2017, 10/18/2018, 11/10/2019, 10/06/2020, 09/25/2021    Pneumococcal 02/25/2014    Pneumococcal Conjugate - 20 Valent 11/01/2022    Pneumococcal Polysaccharide - 23 Valent 09/10/2012    Tdap 06/20/2016    Zoster Recombinant 09/23/2021, 03/24/2022     Past Surgical History:   Procedure  Laterality Date    CARPAL TUNNEL RELEASE      CARPAL TUNNEL RELEASE Right 07/06/2016    COLONOSCOPY  08/22/2014    HYSTERECTOMY      tendon sheath incision      TONSILLECTOMY      TUBAL LIGATION       family history includes Cancer in her father and mother; Diabetes in her maternal grandmother and paternal grandmother; Hypertension in her father.    Social History     Socioeconomic History    Marital status: Single   Tobacco Use    Smoking status: Never    Smokeless tobacco: Never    Tobacco comments:     None   Substance and Sexual Activity    Alcohol use: Not Currently     Comment: None    Drug use: Never    Sexual activity: Not Currently     Birth control/protection: None     Social Determinants of Health     Financial Resource Strain: Low Risk  (10/12/2023)    Overall Financial Resource Strain (CARDIA)     Difficulty of Paying Living Expenses: Not very hard   Food Insecurity: No Food Insecurity (10/12/2023)    Hunger Vital Sign     Worried About Running Out of Food in the Last Year: Never true     Ran Out of Food in the Last Year: Never true   Transportation Needs: No Transportation Needs (10/12/2023)    PRAPARE - Transportation     Lack of Transportation (Medical): No     Lack of Transportation (Non-Medical): No   Physical Activity: Sufficiently Active (10/12/2023)    Exercise Vital Sign     Days of Exercise per Week: 7 days     Minutes of Exercise per Session: 90 min   Stress: No Stress Concern Present (10/12/2023)    [employer] [place] [employer] - Occupational Stress Questionnaire     Feeling of Stress : Not at all   Social Connections: Socially Integrated (10/12/2023)    Social Connection and Isolation Panel [NHANES]     Frequency of Communication with Friends and Family: More than three times a week     Frequency of Social Gatherings with Friends and Family: More than three times a week     Attends Restorationist Services: More than 4 times per year     Active Member of Clubs or Organizations: Yes     " Attends Club or Organization Meetings: More than 4 times per year     Marital Status:    Housing Stability: Low Risk  (10/12/2023)    Housing Stability Vital Sign     Unable to Pay for Housing in the Last Year: No     Number of Places Lived in the Last Year: 1     Unstable Housing in the Last Year: No     Current Outpatient Medications   Medication Instructions    ACCU-CHEK GUIDE TEST STRIPS Strp CHECK GLUCOSE TWICE A DAY    acyclovir 5% (ZOVIRAX) 5 % Crea Topical (Top), 5 times daily    aspirin (ECOTRIN) 81 mg, Oral, Daily    atorvastatin (LIPITOR) 20 mg, Oral, Daily    azelastine (ASTELIN) 274 mcg    BD NORMA 2ND GEN PEN NEEDLE 32 gauge x 5/32" Ndle USE DAILY TO INJECT INSULIN    BD ULTRA-FINE SHORT PEN NEEDLE 31 gauge x 5/16" Ndle USE DAILY AS DIRECTED    blood sugar diagnostic (ACCU-CHEK GUIDE TEST STRIPS MISC)   ACCU-CHEK GUIDE TEST STRIP, See Instructions, USE ONE STRIP TO TEST BLOOD GLUCOSE TWICE DAILY, # 50 unknown unit, 3 Refill(s), Pharmacy: [id number], 158, cm, Height/Length Dosing, 11/18/21 13:08:00 CST, 85.9, kg, Weight Dosing, 11/18/21 13:08:00 CST    cyclobenzaprine (FLEXERIL) 10 mg, Oral, Nightly PRN    diclofenac sodium (VOLTAREN) 1 % Gel APPLY 2 GRAMS TO AFFECTED AREA 3 TIMES A DAY    DULoxetine (CYMBALTA) 60 mg, Oral, Nightly    fluconazole (DIFLUCAN) 100 mg, Oral, Every 3 days    glimepiride (AMARYL) 2 MG tablet TAKE 1 TABLET BY MOUTH EVERY DAY WITH THE FIRST MEAL OF THE DAY    guaiFENesin-codeine 100-10 mg/5 ml (TUSSI-ORGANIDIN NR)  mg/5 mL syrup 5 mLs, Oral, 3 times daily PRN    insulin (LANTUS SOLOSTAR U-100 INSULIN) 18 Units, Subcutaneous, Daily    ipratropium (ATROVENT) 21 mcg (0.03 %) nasal spray SPRAY 1 SPRAY IN EACH NOSTRIL 4 (FOUR) TIMES DAILY AS NEEDED FOR RUNNY NOSE    JARDIANCE 25 mg, Oral, Daily    lisinopriL (PRINIVIL,ZESTRIL) 5 mg, Oral, Daily    loratadine (CLARITIN) 10 mg, Oral, Daily    medical supply, miscellaneous (0.2 MICRON FILTER ATTACHMENT MISC)   BD UF " "short Pen Needle 8MMx 31g, See Instructions, Use daily to inject insulin' Dx Code E11.9 SAMMIE 99 mon, # 100 EA, 11 Refill(s), Pharmacy: [id number], 157, cm, Height/Length Dosing, 06/10/21 13:27:00 CDT, 85.4, kg, Weight Dosing, 06/10/21 1...    metoprolol succinate (TOPROL-XL) 25 mg, Oral, Daily    MOUNJARO 5 mg, Subcutaneous, Every 7 days    mupirocin (BACTROBAN) 2 % ointment Topical (Top), 3 times daily    nitroGLYCERIN (NITROSTAT) 0.4 mg, Sublingual    ondansetron (ZOFRAN) 4 mg, Oral, Every 6 hours    pen needle, diabetic 32 gauge x 5/32" Ndle BD yanelis insulin pen needle for once a day injections    pregabalin (LYRICA) 75 mg, Oral, 3 times daily    tirzepatide 7.5 mg, Subcutaneous, Every 7 days    tirzepatide 10 mg, Subcutaneous, Every 7 days    tretinoin (RETIN-A) 0.05 % cream Topical (Top), Nightly       Subjective:     Review of Systems   Constitutional: Negative.    HENT: Negative.     Eyes: Negative.    Respiratory: Negative.     Cardiovascular: Negative.    Gastrointestinal: Negative.    Endocrine: Negative.    Genitourinary: Negative.    Musculoskeletal: Negative.    Skin: Negative.    Allergic/Immunologic: Negative.    Neurological: Negative.    Hematological: Negative.    Psychiatric/Behavioral: Negative.         Objective:     Visit Vitals  /75 (BP Location: Right arm, Patient Position: Sitting, BP Method: Large (Automatic))   Pulse 68   Temp 97.9 °F (36.6 °C) (Oral)   Resp 20   Ht 5' 2.01" (1.575 m)   Wt 78.9 kg (174 lb)   SpO2 100%   BMI 31.82 kg/m²       Physical Exam  Vitals reviewed.   Constitutional:       Appearance: Normal appearance. She is obese.   HENT:      Head: Normocephalic and atraumatic.      Mouth/Throat:      Mouth: Mucous membranes are moist.      Pharynx: Oropharynx is clear.   Eyes:      Extraocular Movements: Extraocular movements intact.      Conjunctiva/sclera: Conjunctivae normal.      Pupils: Pupils are equal, round, and reactive to light.   Cardiovascular:      Rate " and Rhythm: Normal rate and regular rhythm.      Heart sounds: Normal heart sounds.   Pulmonary:      Effort: Pulmonary effort is normal.      Breath sounds: Normal breath sounds.   Chest:       Abdominal:      General: Bowel sounds are normal.   Musculoskeletal:         General: Normal range of motion.      Cervical back: Normal range of motion.   Skin:     General: Skin is warm and dry.   Neurological:      Mental Status: She is alert and oriented to person, place, and time.   Psychiatric:         Mood and Affect: Mood normal.         Behavior: Behavior normal.       Labs Reviewed:     Hematology:  Lab Results   Component Value Date    WBC 8.07 06/05/2023    HGB 13.4 06/05/2023    HCT 41.4 06/05/2023     06/05/2023     Chemistry:  Lab Results   Component Value Date     10/09/2023    K 3.8 10/09/2023    CHLORIDE 106 10/09/2023    BUN 8.0 10/09/2023    CREATININE 0.95 10/09/2023    EGFRNORACEVR 67 10/09/2023    GLUCOSE 146 (H) 10/09/2023    CALCIUM 9.5 10/09/2023    ALKPHOS 69 10/09/2023    LABPROT 7.6 10/09/2023    ALBUMIN 4.5 10/09/2023    BILIDIR 0.2 12/30/2021    IBILI 0.30 12/30/2021    AST 25 10/09/2023    ALT 19 10/09/2023    MG 1.80 10/16/2022    PHOS 3.3 10/27/2020    [id number] 18.9 (L) 10/27/2020     Lab Results   Component Value Date    HGBA1C 8.6 (H) 12/30/2022     Lipid Panel:  Lab Results   Component Value Date    CHOL 165 10/09/2023    CHOL 159 06/16/2022    HDL 63 (H) 10/09/2023    HDL 55 06/16/2022    LDL 83.00 12/30/2022    TRIG 116 10/09/2023    TRIG 169 (A) 06/16/2022    TOTALCHOLEST 4 12/30/2022     Thyroid:  Lab Results   Component Value Date    TSH 1.416 12/30/2022     Urine:  Lab Results   Component Value Date    COLORUA Yellow 10/09/2023    APPEARANCEUA Clear 10/09/2023    SGUA 1.015 10/09/2023    PHUA 5.5 10/09/2023    PROTEINUA Negative 10/09/2023    GLUCOSEUA >=1000 (A) 10/09/2023    KETONESUA Negative 10/09/2023    BLOODUA Negative 10/09/2023    NITRITESUA Negative  10/09/2023    LEUKOCYTESUR Negative 10/09/2023    RBCUA 0-5 12/30/2022    WBCUA 0-5 12/30/2022    BACTERIA None Seen 12/30/2022    SQEPUA Trace (A) 12/30/2022    HYALINECASTS None Seen 12/30/2022    CREATRANDUR 104.1 10/09/2023    PROTEINURINE 6.0 10/09/2023    UPROTCREA 0.1 10/09/2023        Assessment:       ICD-10-CM ICD-9-CM   1. Pain in right axilla  M79.621 729.5   2. Fever blister  B00.1 054.9   3. Primary hypertension  I10 401.9   4. Mixed hyperlipidemia  E78.2 272.2   5. Type 2 diabetes mellitus without complication, with long-term current use of insulin  E11.9 250.00    Z79.4 V58.67   6. Class 1 obesity due to excess calories with serious comorbidity and body mass index (BMI) of 31.0 to 31.9 in adult  E66.09 278.00    Z68.31 V85.31        Plan:     1. Pain in right axilla  - US Soft Tissue Axilla, Right; Future    2. Fever blister  - acyclovir 5% (ZOVIRAX) 5 % Crea; Apply topically 5 (five) times daily.  Dispense: 5 g; Refill: 1    3. Primary hypertension  Vitals:    10/12/23 1310   BP: 106/75   Pulse: 68   Resp: 20   Temp: 97.9 °F (36.6 °C)      Results for orders placed or performed in visit on 06/05/23   Microalbumin/Creatinine Ratio, Urine   Result Value Ref Range    Urine Microalbumin <5.0 <=30.0 ug/ml    Urine Creatinine 91.8 47.0 - 110.0 mg/dL    Microalbumin Creatinine Ratio <5.4 0.0 - 30.0 mg/gm Cr     Results for orders placed or performed during the hospital encounter of 10/16/22   EKG 12-lead    Collection Time: 10/16/22  4:21 PM    Narrative    Test Reason : R53.1,    Vent. Rate : 068 BPM     Atrial Rate : 068 BPM     P-R Int : 138 ms          QRS Dur : 076 ms      QT Int : 386 ms       P-R-T Axes : 033 016 021 degrees     QTc Int : 410 ms    Normal sinus rhythm with sinus arrhythmia  Minimal voltage criteria for LVH, may be normal variant  Borderline Abnormal ECG  No previous ECGs available  Confirmed by Levy HARDIN, Marshall ([id number]) on 10/17/2022 8:27:56 AM    Referred By: [email or web address]   SELF            Confirmed By:Marshall Lockett MD   Continue Lisinopril 5 mg daily, Metoprolol Succ 25 mg daily  DASH diet  Encouraged home blood pressure monitoring    4. Mixed hyperlipidemia  Continue Atorvastatin to 20 mg daily  Weight loss encouraged  Low fat/high fiber diet  Increase physical activity  Cholesterol Total   Date Value Ref Range Status   10/09/2023 165 0 - 200 mg/dL Final     HDL Cholesterol   Date Value Ref Range Status   10/09/2023 63 (H) 40 - 60 mg/dL Final     Triglyceride   Date Value Ref Range Status   10/09/2023 116 30 - 200 mg/dL Final     LDL Cholesterol   Date Value Ref Range Status   12/30/2022 83.00 50.00 - 140.00 mg/dL Final     5. Type 2 diabetes mellitus without complication, with long-term current use of insulin  Continue Glimepiride 2 mg daily, Lantus 18 units daily, Jardiance 25 mg daily, Mounjaro 10 mg weekly  Encouraged home CBG monitoring.  Hypoglycemic episodes: denies  Body mass index is 31.82 kg/m².  Hemoglobin A1c   Date Value Ref Range Status   12/30/2022 8.6 (H) <=7.0 % Final     Comment:     Please send orders to [facility]     Results for orders placed or performed in visit on 06/05/23   Microalbumin/Creatinine Ratio, Urine   Result Value Ref Range    Urine Microalbumin <5.0 <=30.0 ug/ml    Urine Creatinine 91.8 47.0 - 110.0 mg/dL    Microalbumin Creatinine Ratio <5.4 0.0 - 30.0 mg/gm Cr   On Atorvastatin  On Lisinopril  Weight Loss Encouraged  ADA Diet    6. Class 1 obesity due to excess calories with serious comorbidity and body mass index (BMI) of 32.0 to 32.9 in adult  Body mass index is 31.82 kg/m².  TSH   Date Value Ref Range Status   12/30/2022 1.416 0.350 - 4.940 uIU/mL Final     Comment:     Please send orders to [facility]     Vit D 25 OH   Date Value Ref Range Status   10/27/2020 18.9 (L) 30.0 - 80.0 ng/mL Final     Hemoglobin A1c   Date Value Ref Range Status   12/30/2022 8.6 (H) <=7.0 % Final     Comment:     Please send orders to [facility]   Sleep Study: none noted  Weight Loss Encouraged  Increase Physical Activity      Follow up in about 4 months (around 2/12/2024) for Labs. In addition to their scheduled follow up, the patient has also been instructed to follow up on as needed basis.     Cayla Bruno, CHANDNIP

## 2023-11-02 ENCOUNTER — OFFICE VISIT (OUTPATIENT)
Dept: ENDOCRINOLOGY | Facility: CLINIC | Age: 63
End: 2023-11-02
Payer: MEDICAID

## 2023-11-02 VITALS
DIASTOLIC BLOOD PRESSURE: 70 MMHG | HEART RATE: 65 BPM | RESPIRATION RATE: 18 BRPM | HEIGHT: 62 IN | BODY MASS INDEX: 31.4 KG/M2 | WEIGHT: 170.63 LBS | SYSTOLIC BLOOD PRESSURE: 107 MMHG | TEMPERATURE: 98 F

## 2023-11-02 DIAGNOSIS — E11.65 UNCONTROLLED TYPE 2 DIABETES MELLITUS WITH HYPERGLYCEMIA: Primary | ICD-10-CM

## 2023-11-02 DIAGNOSIS — G62.9 NEUROPATHY: ICD-10-CM

## 2023-11-02 LAB — HBA1C MFR BLD: 7 %

## 2023-11-02 PROCEDURE — 99214 PR OFFICE/OUTPT VISIT, EST, LEVL IV, 30-39 MIN: ICD-10-PCS | Mod: S$PBB,,, | Performed by: NURSE PRACTITIONER

## 2023-11-02 PROCEDURE — 83036 HEMOGLOBIN GLYCOSYLATED A1C: CPT | Mod: PBBFAC | Performed by: NURSE PRACTITIONER

## 2023-11-02 PROCEDURE — 1160F RVW MEDS BY RX/DR IN RCRD: CPT | Mod: CPTII,,, | Performed by: NURSE PRACTITIONER

## 2023-11-02 PROCEDURE — 3051F HG A1C>EQUAL 7.0%<8.0%: CPT | Mod: CPTII,,, | Performed by: NURSE PRACTITIONER

## 2023-11-02 PROCEDURE — 4010F ACE/ARB THERAPY RXD/TAKEN: CPT | Mod: CPTII,,, | Performed by: NURSE PRACTITIONER

## 2023-11-02 PROCEDURE — 3078F PR MOST RECENT DIASTOLIC BLOOD PRESSURE < 80 MM HG: ICD-10-PCS | Mod: CPTII,,, | Performed by: NURSE PRACTITIONER

## 2023-11-02 PROCEDURE — 3066F PR DOCUMENTATION OF TREATMENT FOR NEPHROPATHY: ICD-10-PCS | Mod: CPTII,,, | Performed by: NURSE PRACTITIONER

## 2023-11-02 PROCEDURE — 3008F BODY MASS INDEX DOCD: CPT | Mod: CPTII,,, | Performed by: NURSE PRACTITIONER

## 2023-11-02 PROCEDURE — 1159F MED LIST DOCD IN RCRD: CPT | Mod: CPTII,,, | Performed by: NURSE PRACTITIONER

## 2023-11-02 PROCEDURE — 1159F PR MEDICATION LIST DOCUMENTED IN MEDICAL RECORD: ICD-10-PCS | Mod: CPTII,,, | Performed by: NURSE PRACTITIONER

## 2023-11-02 PROCEDURE — 3074F SYST BP LT 130 MM HG: CPT | Mod: CPTII,,, | Performed by: NURSE PRACTITIONER

## 2023-11-02 PROCEDURE — 4010F PR ACE/ARB THEARPY RXD/TAKEN: ICD-10-PCS | Mod: CPTII,,, | Performed by: NURSE PRACTITIONER

## 2023-11-02 PROCEDURE — 3008F PR BODY MASS INDEX (BMI) DOCUMENTED: ICD-10-PCS | Mod: CPTII,,, | Performed by: NURSE PRACTITIONER

## 2023-11-02 PROCEDURE — 3061F NEG MICROALBUMINURIA REV: CPT | Mod: CPTII,,, | Performed by: NURSE PRACTITIONER

## 2023-11-02 PROCEDURE — 1160F PR REVIEW ALL MEDS BY PRESCRIBER/CLIN PHARMACIST DOCUMENTED: ICD-10-PCS | Mod: CPTII,,, | Performed by: NURSE PRACTITIONER

## 2023-11-02 PROCEDURE — 99213 OFFICE O/P EST LOW 20 MIN: CPT | Mod: PBBFAC | Performed by: NURSE PRACTITIONER

## 2023-11-02 PROCEDURE — 3061F PR NEG MICROALBUMINURIA RESULT DOCUMENTED/REVIEW: ICD-10-PCS | Mod: CPTII,,, | Performed by: NURSE PRACTITIONER

## 2023-11-02 PROCEDURE — 3074F PR MOST RECENT SYSTOLIC BLOOD PRESSURE < 130 MM HG: ICD-10-PCS | Mod: CPTII,,, | Performed by: NURSE PRACTITIONER

## 2023-11-02 PROCEDURE — 3066F NEPHROPATHY DOC TX: CPT | Mod: CPTII,,, | Performed by: NURSE PRACTITIONER

## 2023-11-02 PROCEDURE — 99214 OFFICE O/P EST MOD 30 MIN: CPT | Mod: S$PBB,,, | Performed by: NURSE PRACTITIONER

## 2023-11-02 PROCEDURE — 3051F PR MOST RECENT HEMOGLOBIN A1C LEVEL 7.0 - < 8.0%: ICD-10-PCS | Mod: CPTII,,, | Performed by: NURSE PRACTITIONER

## 2023-11-02 PROCEDURE — 3078F DIAST BP <80 MM HG: CPT | Mod: CPTII,,, | Performed by: NURSE PRACTITIONER

## 2023-11-02 NOTE — PROGRESS NOTES
Subjective     Patient ID: Pedro Vergara is a 62 y.o. female.    Chief Complaint: Diabetes    Previous endocrine clinic notes      03/05/2021 Endocrine Clinic Note: Ms. Pedro Vergara is a 60 yo F with PMHx of T2DM, vitamin D deficiency, CKD stage III, and hypertension. She presents today for follow-up regarding diabetes management. She is currently taking Victoza 1.8, glimepiride 4, Lantus 25U, and Jardiance 10 in the am. She states that she checks blood glucose at once in the morning and once at night and endorses that she often has hypoglycemia in the mornings reaching blood glucoses in the 60s. Other times she states that her blood glucose is higher reaching around the 140s-150s. She is compliant with her diabetes medication regimen and expresses that her diet is much improved from her previous visit, eating more vegetables and healthy meats. She admits that she does not always eat breakfast, which might be contributing to her hypoglycemic episodes. Today, she says she is feeling fatigued, which she attributes to her recent COVID vaccine and hypoglycemia. (1) -Last A1C on 3/3/21 7.5% (down 8.8% in 12/2020), trending in proper direction, A1C goal <7%  -Continue Lantus 25U, Victoza 1.8 mg  -Decrease glimepiride to 2mg at breakfast with none at night given hypoglycemic episodes; if pt does not eat breakfast, instructed to take glimepiride with largest meal  -Increase Jardiance to 25 mg in am given still having hyperglycemic episodes  -Maintain fasting glucose between  and post-prandial glucose <180  -Counseled patient on importance of bringing glucose log to next visit, patient agreeable  -Encouraged patient to continue with healthy diet and to ensure she eats to prevent hypoglycemic episodes (2)     06/10/2021 Endocrine Clinic Note: 60-year-old female scheduled today as endocrine clinic follow-up.  History of uncontrolled type 2 diabetes, CKD stage III, hypertension, hyperlipidemia, vitamin D  deficiency.  Patient was previously referred to endocrine clinic is here today for follow-up visit.  Current A1c 7.9 increased from Previous A1c 7.5,  previous 8.8 and 11.0.  Patient has only checks CBG's about once a week range of 110-157.  Patient denies any symptoms of hypoglycemia.  On patient's previous visit glimepiride was changed from twice a day to once in the morning due to a.m. hypoglycemia which has resolved.  Patient was started on Victoza and was to titrate up as stated currently she is on 1.2 mg.  Hypertension at goal today.  CKD/nephropathy Patient is seen by renal clinic previous renal functions GFR 55, creatinine 1.26, BUN 16 improved from previous renal functions on 8/11/2020 GFR 42, creatinine 42, BUN 1.6.  Neuropathy patient was put on gabapentin and sees a podiatrist but was recently referred to neurologist due to sciatica. (1)     10/11/2021 Endocrine Clinic Note: 60-year-old female scheduled today for endocrine clinic follow-up.  History of uncontrolled type 2 diabetes, CKD stage III, hypertension, hyperlipidemia and vitamin D deficiency.  Uncontrolled type 2 diabetes current A1c 7.2 improved from previous 7.9, 7.5, 8.8 and 11.0. Patient checks CBGs daily fasting ranges .  Patient has frequent hypoglycemia in the a.m.  When discussing with patient patient needs at 6 PM and at times does not eat until the next day at noon.  Discussed with patient today eating breakfast in the morning.  Also DM education will discussed with patient today eating 3 meals per day. Neuropathy patient is currently on gabapentin.  Nephropathy urine micro elevated 10/27/2020 on lisinopril 5 mg repeat urine micro today.  Hypertension at goal. CKD patient is followed by renal clinic.  Patient has fever blisters breaking out around her mouth previously renal clinic is giving her Valtrex 1 g x 2 doses longer dosing not recommended due to renal functions.  Nephropathy urine micro mild elevation 12/30/2021 patient is  currently on low-dose ACE.  Neuropathy discussed with patient controlling diabetes.     Endocrine clinic note 08/04/2022: 61-year-old female scheduled today for endocrine clinic follow-up.  History of uncontrolled type 2 diabetes with hypoglycemia, CKD stage 3, hypertension, hyperlipidemia vitamin-D deficiency.  Uncontrolled type 2 diabetes current A1c 7.5 previous 7.2 and 7.9.  Patient checks CBG is 1-2 times per day CBG's range fasting  in the am.  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  Will need patient's CGM alert patient hypoglycemia.  Patient reports current A1c slightly increased due to her being on a cruise recently and states she being large meals and lots of meals overnight on ADA diet.  She returned from the cruise a few days ago.     Endocrine clinic note 12/20/2022: 61 Female scheduled today as endocrine clinic follow-up.  History of type 2 diabetes, CKD stage 3 with nephropathy/neuropathy, hypertension, hyperlipidemia vitamin-D deficiency.  Type 2 diabetes current A1c 8.4 previous A1C 7.5, 7.6 and 7.2.  On patient's previous visit patient was having hypoglycemia on basal insulin was decreased from 22 units to 18 units.  Patient continued to have hypoglycemia Lantus was decreased to 12 units.  Patient had increasing A1c in the last month and a half patient has had severe vertigo and has not been active in his closely staying in bed due to severe vertigo.  Patient is in walk-in clinic and states she was giving doses of meclizine which helped but she ran out.  Patient also reports popping to bilateral ears patient has a prescription and Zyrtec but has not been taking.  Instructed patient to start Zyrtec daily use meclizine as needed and will refer her to ENT clinic.   Patient checks CBGS 3-4 times per week ranges 76 to 170. Nephropathy patient is currently on an Ace urine micro on 08/04/2022 normal.  Patient has CKD stage 3 and is currently seen in renal Clinic encouraged patient to keep all appointments.  Patient has had recent improvement in kidney function.  Neuropathy foot exam done 08/14/2022.      Endocrine clinic note 06/01/2023:  62-year-old female scheduled today for endocrine clinic follow-up.  History of uncontrolled type 2 diabetes with CKD stage 3 with nephropathy, neuropathy, hypertension, hyperlipidemia vitamin-D deficiency.  Type 2 diabetes current A1c increased to 10.9 from previous 8.4 and 7.5.  Patient states she initially had appetite control on Trulicity was eating better but no longer his appetite control and has started eating more foods outside of ADA diet.  She states compliance with medication.  She denies any hypoglycemia.  Previously Trulicity working uncontrolled appetite and also patient's diabetes.  Patient also reports darkness in the her eyes with small pimples states previously she was on based cream that helped improved patient had a picture tretinoin cream.     Endocrine clinic note 11/02/2023:  62 year female scheduled today for endocrine clinic follow-up.  History of uncontrolled type 2 diabetes CKD stage 3 with nephropathy, neuropathy, hypertension, hyperlipidemia and vitamin-D deficiency.  Type 2 diabetes current A1C 7.0 Previous A1c 10.9, 8.4 and 7.5.  Previously patient had lost appetite control on Trulicity and was eating more carbs starches non ADA diet and A1c increased to 10.9.  Patient was changed to Mounjaro has increase to Mounjaro 10 mg. Pt has improved A1C and 15 pound weight loss.  Patient denies any side effects to her medications and states compliance.  Foot exam performed today see documentation.  Neuropathy patient states improvement with controlled diabetes and also patient has improved kidney function with controlled  diabetes.                 Review of Systems   Constitutional:  Negative for activity change, appetite change and fatigue.   HENT:  Negative for dental problem, hearing loss, tinnitus, trouble swallowing and goiter.    Eyes:  Negative for photophobia, pain and visual disturbance.   Respiratory:  Negative for cough, chest tightness and wheezing.    Cardiovascular:  Negative for chest pain, palpitations and leg swelling.   Gastrointestinal:  Negative for abdominal pain, constipation, diarrhea, nausea and reflux.   Endocrine: Negative for cold intolerance, heat intolerance, polydipsia and polyphagia.   Genitourinary:  Negative for difficulty urinating, flank pain, hematuria, hot flashes, menstrual irregularity, menstrual problem, nocturia and urgency.   Musculoskeletal:  Negative for back pain, gait problem, joint swelling, leg pain and joint deformity.   Integumentary:  Negative for color change, pallor, rash and breast discharge.   Allergic/Immunologic: Negative for environmental allergies, food allergies and immunocompromised state.   Neurological:  Negative for tremors, seizures, headaches, memory loss and coordination difficulties.   Psychiatric/Behavioral:  Negative for agitation, behavioral problems and sleep disturbance. The patient is not nervous/anxious.           Objective     Physical Exam  Constitutional:       General: She is not in acute distress.     Appearance: Normal appearance. She is not ill-appearing.   HENT:      Head: Normocephalic and atraumatic.      Right Ear: External ear normal.      Left Ear: External ear normal.      Nose: Nose normal. No congestion or rhinorrhea.      Mouth/Throat:      Mouth: Mucous membranes are moist.      Pharynx: Oropharynx is clear. No oropharyngeal exudate.   Eyes:      General:         Right eye: No discharge.         Left eye: No discharge.      Conjunctiva/sclera: Conjunctivae normal.      Pupils: Pupils are equal, round, and reactive to light.   Neck:       Thyroid: No thyroid mass, thyromegaly or thyroid tenderness.   Cardiovascular:      Rate and Rhythm: Normal rate and regular rhythm.      Pulses: Normal pulses.           Dorsalis pedis pulses are 2+ on the right side and 2+ on the left side.        Posterior tibial pulses are 2+ on the right side and 2+ on the left side.      Heart sounds: Normal heart sounds. No murmur heard.  Pulmonary:      Effort: Pulmonary effort is normal. No respiratory distress.      Breath sounds: Normal breath sounds.   Abdominal:      General: Abdomen is flat. Bowel sounds are normal. There is no distension.      Palpations: Abdomen is soft.      Tenderness: There is no abdominal tenderness.   Musculoskeletal:         General: No swelling or tenderness. Normal range of motion.      Cervical back: Normal range of motion and neck supple. No tenderness.      Right lower leg: No edema.      Left lower leg: No edema.   Feet:      Right foot:      Protective Sensation: 5 sites tested.  5 sites sensed.      Skin integrity: Skin integrity normal.      Toenail Condition: Right toenails are normal.      Left foot:      Protective Sensation: 5 sites tested.  5 sites sensed.      Skin integrity: Skin integrity normal.      Toenail Condition: Left toenails are normal.   Lymphadenopathy:      Cervical: No cervical adenopathy.   Skin:     General: Skin is warm and dry.      Coloration: Skin is not jaundiced or pale.   Neurological:      General: No focal deficit present.      Mental Status: She is alert and oriented to person, place, and time. Mental status is at baseline.      Coordination: Coordination normal.      Gait: Gait normal.   Psychiatric:         Mood and Affect: Mood normal.         Behavior: Behavior normal.         Thought Content: Thought content normal.            Assessment and Plan     1. Uncontrolled type 2 diabetes mellitus with hyperglycemia  Current A1C 7.0   Urine Micro Creatine/ratio: 10/09/2023 normal   Medications:  Glimepiride 2 mg Qday, Jardiance 25mg Qday, Mounjaro 10 mg q week  Lantus 18 units  Changes: none   Eye Exam: 11/14/2022 Fundus  Foot Exam: 11/02/2023   Home Glucometer Use: Once a day   Last Hypoglycemic episode: None   Follow ADA diet, avoid soda, simple sweets, and limit rice, breads and starches  Maintain healthy weight, exercise 4-5 times a week for 30 minutes  Diet and medication compliance discussed on visit  -     Hemoglobin A1C, POCT      2. Neuropathy  Foot exam today 11/02/2023  Education on foot care given       Follow up in about 6 months (around 5/2/2024) for Type 2 diabetes.    I spent a total of 30 minutes on the day of the visit.  This includes face to face time and non-face to face time preparing to see the patient (eg, review of tests), obtaining and/or reviewing separately obtained history, documenting clinical information in the electronic or other health record, independently interpreting results and communicating results to the patient/family/caregiver, or care coordinator.

## 2023-11-06 ENCOUNTER — TELEPHONE (OUTPATIENT)
Dept: ENDOCRINOLOGY | Facility: CLINIC | Age: 63
End: 2023-11-06
Payer: MEDICAID

## 2023-11-06 ENCOUNTER — HOSPITAL ENCOUNTER (OUTPATIENT)
Dept: RADIOLOGY | Facility: HOSPITAL | Age: 63
Discharge: HOME | End: 2023-11-06
Attending: NURSE PRACTITIONER
Payer: MEDICAID

## 2023-11-06 DIAGNOSIS — M79.621 PAIN IN RIGHT AXILLA: ICD-10-CM

## 2023-11-06 PROCEDURE — 76882 US LMTD JT/FCL EVL NVASC XTR: CPT | Mod: TC,RT

## 2023-11-06 NOTE — TELEPHONE ENCOUNTER
Spoke with pt stated that she did receive her tirzepatide 10 mg/0.5 mL PnI and all of her prescriptions was transferred to [facility] in [place]

## 2023-11-06 NOTE — TELEPHONE ENCOUNTER
Medication Being Authorized     tirzepatide 10 mg/0.5 mL PnIj    Inject 10 mg into the skin every 7 days.    Dispense: 4 pen Refills: 5     Start: 8/1/2023      Class: Normal Diagnoses: Uncontrolled type 2 diabetes mellitus with hyperglycemia     This order has been released to its destination.

## 2023-11-07 ENCOUNTER — PATIENT MESSAGE (OUTPATIENT)
Dept: INTERNAL MEDICINE | Facility: CLINIC | Age: 63
End: 2023-11-07
Payer: MEDICAID

## 2023-11-07 ENCOUNTER — TELEPHONE (OUTPATIENT)
Dept: INTERNAL MEDICINE | Facility: CLINIC | Age: 63
End: 2023-11-07
Payer: MEDICAID

## 2023-11-10 RX ORDER — IPRATROPIUM BROMIDE 21 UG/1
SPRAY, METERED NASAL
Qty: 30 ML | Refills: 5 | Status: SHIPPED | OUTPATIENT
Start: 2023-11-10

## 2023-11-20 LAB
LEFT EYE DM RETINOPATHY: NEGATIVE
RIGHT EYE DM RETINOPATHY: NEGATIVE

## 2023-11-27 ENCOUNTER — TELEPHONE (OUTPATIENT)
Dept: ENDOCRINOLOGY | Facility: CLINIC | Age: 63
End: 2023-11-27
Payer: MEDICAID

## 2023-11-27 DIAGNOSIS — E11.65 UNCONTROLLED TYPE 2 DIABETES MELLITUS WITH HYPERGLYCEMIA: Primary | ICD-10-CM

## 2023-11-27 RX ORDER — TIRZEPATIDE 12.5 MG/.5ML
12.5 INJECTION, SOLUTION SUBCUTANEOUS
Qty: 4 PEN | Refills: 4 | Status: SHIPPED | OUTPATIENT
Start: 2023-11-27 | End: 2024-02-15

## 2023-11-27 NOTE — TELEPHONE ENCOUNTER
Spoke with pt informed her that her RX was sent to [facility] per her request. Pt verbalized understanding.

## 2023-11-27 NOTE — TELEPHONE ENCOUNTER
----- Message from Dipti Stubbs sent at 11/27/2023 12:50 PM CST -----  Regarding: Herbie JIMENEZ PT         Pt is requesting a call back regarding the Moujaro dosage. She is trying to follow up on the change in dosage.   Pt # [phone]

## 2023-11-27 NOTE — TELEPHONE ENCOUNTER
Spoke with pt requesting Moujaro dosage increase. Pt states she her appetite has return and you and her discuss her having a dosage increase if her appetite returned. She would like for Rx to go to [facility] in [place] I updated her preferred pharmacy.  Please advise.        Pt # [phone]

## 2023-11-30 ENCOUNTER — TELEPHONE (OUTPATIENT)
Dept: ENDOCRINOLOGY | Facility: CLINIC | Age: 63
End: 2023-11-30
Payer: MEDICAID

## 2023-11-30 NOTE — TELEPHONE ENCOUNTER
"PA request initiated         ----- Message from Lexi Waite MA sent at 11/28/2023  1:14 PM CST -----  Regarding: PA  for Mounjaro  Patient called stating" I received a call from [facility] pharmacy informing me that the Mounjaro 12.5 mg needs a PA and a communication was sent over to the office."    "

## 2023-12-01 NOTE — TELEPHONE ENCOUNTER
Spoke with pharmacy associate and she verified that Mounjaro 12.5mg has been picked up by patient.

## 2023-12-01 NOTE — TELEPHONE ENCOUNTER
Spoke with Sylvester beck from [employer] [place] [employer] at [phone]. Informed that this message was received from Mannie:     Status  Sent to Plan on November 30  Cannot find matching patient  Next Steps  To follow up, call the plan at [phone]    Drug  Mounjaro 12.5MG/0.5ML pen-injectors  Form  [employer] [place] [employer] Prior Authorization Form  Prior Authorization for [employer] [place] [employer] Members  [phone]  [phone]    She stated that the medication has been purchased on 11/29/2023 at [facility] in [place], [place]     Will call pharmacy to verify.

## 2023-12-04 DIAGNOSIS — Z12.11 SPECIAL SCREENING FOR MALIGNANT NEOPLASMS, COLON: Primary | ICD-10-CM

## 2023-12-05 RX ORDER — POLYETHYLENE GLYCOL 3350, SODIUM SULFATE, SODIUM CHLORIDE, POTASSIUM CHLORIDE, SODIUM ASCORBATE, AND ASCORBIC ACID 7.5-2.691G
KIT ORAL
Qty: 1 KIT | Refills: 0 | Status: SHIPPED | OUTPATIENT
Start: 2023-12-05

## 2024-01-02 RX ORDER — TIRZEPATIDE 10 MG/.5ML
10 INJECTION, SOLUTION SUBCUTANEOUS WEEKLY
Qty: 4 PEN | Refills: 5 | Status: SHIPPED | OUTPATIENT
Start: 2024-01-02

## 2024-01-03 ENCOUNTER — TELEPHONE (OUTPATIENT)
Dept: ENDOCRINOLOGY | Facility: CLINIC | Age: 64
End: 2024-01-03
Payer: MEDICARE

## 2024-01-03 NOTE — TELEPHONE ENCOUNTER
Called Dale because received this message regarding the PA:   Attention: Your PA request has been converted to a general form because of: <Could not find matching patient>. Please fill out the general PA form to continue. Determinations typically take 48-72 hours. For additional information, please contact the phone number on back of the member prescription card.     spoke with Shani and got PA Approved for Mounjaro 12.5mg from 01/03/24 -01/02/25 PA#: [id number]

## 2024-02-02 ENCOUNTER — ANESTHESIA EVENT (OUTPATIENT)
Dept: ENDOSCOPY | Facility: HOSPITAL | Age: 64
End: 2024-02-02
Payer: MEDICARE

## 2024-02-02 RX ORDER — LIDOCAINE HYDROCHLORIDE 10 MG/ML
1 INJECTION, SOLUTION EPIDURAL; INFILTRATION; INTRACAUDAL; PERINEURAL ONCE
Status: CANCELLED | OUTPATIENT
Start: 2024-02-02 | End: 2024-02-02

## 2024-02-02 NOTE — ANESTHESIA PREPROCEDURE EVALUATION
02/02/2024  Pedro Vergara is a 63 y.o., female with PMHx of HTN, HLD, CKD, DM, anxiety/depression presents for screening colonoscopy.    Metoprolol--last dose 0600  Mounjaro--last dose not taking      Active Ambulatory Problems     Diagnosis Date Noted    Type 2 diabetes mellitus without complication, with long-term current use of insulin 06/29/2022    Hyperlipidemia 06/29/2022    Hypertension 06/29/2022    Class 1 obesity due to excess calories with serious comorbidity and body mass index (BMI) of 32.0 to 32.9 in adult 06/29/2022    Carpal tunnel syndrome 09/07/2022    Allergic rhinitis 09/07/2022    Epistaxis 09/07/2022    Herpes labialis 09/07/2022    Triggering of digit 09/07/2022    Chronic kidney disease 12/20/2022    Neck pain 01/03/2023    Chronic pain of right knee 01/03/2023    Cough 09/29/2023     Resolved Ambulatory Problems     Diagnosis Date Noted    No Resolved Ambulatory Problems     Past Medical History:   Diagnosis Date    Anxiety     CKD (chronic kidney disease)     Depression     Diabetes mellitus, type 2     HLD (hyperlipidemia)     Obesity, unspecified     Unspecified glaucoma        Pre-op Assessment    I have reviewed the Patient Summary Reports.     I have reviewed the Nursing Notes. I have reviewed the NPO Status.   I have reviewed the Medications.     Review of Systems  Anesthesia Hx:  No problems with previous Anesthesia   History of prior surgery of interest to airway management or planning:          Denies Family Hx of Anesthesia complications.    Denies Personal Hx of Anesthesia complications.                    Hematology/Oncology:  Hematology Normal   Oncology Normal                                   EENT/Dental:  EENT/Dental Normal           Cardiovascular:  Cardiovascular Normal                                            Pulmonary:  Pulmonary Normal                        Renal/:  Renal/ Normal                 Hepatic/GI:  Hepatic/GI Normal                 Musculoskeletal:  Musculoskeletal Normal                Neurological:  Neurology Normal                                      Endocrine:  Endocrine Normal            Dermatological:  Skin Normal    Psych:  Psychiatric Normal                    Physical Exam  General: Alert    Airway:  Mallampati: I / I  Mouth Opening: Normal  TM Distance: Normal  Tongue: Normal  Neck ROM: Normal ROM    Dental:  Intact      Lab Results   Component Value Date    WBC 8.07 06/05/2023    HGB 13.4 06/05/2023    HCT 41.4 06/05/2023    MCV 88.8 06/05/2023     06/05/2023       CMP  Sodium Level   Date Value Ref Range Status   10/09/2023 142 135 - 145 mmol/L Final     Potassium Level   Date Value Ref Range Status   10/09/2023 3.8 3.5 - 5.1 mmol/L Final     Carbon Dioxide   Date Value Ref Range Status   10/09/2023 29 21 - 32 mmol/L Final     Blood Urea Nitrogen   Date Value Ref Range Status   10/09/2023 8.0 7.0 - 20.0 mg/dL Final     Creatinine   Date Value Ref Range Status   10/09/2023 0.95 0.66 - 1.25 mg/dL Final     Calcium Level Total   Date Value Ref Range Status   10/09/2023 9.5 8.4 - 10.2 mg/dL Final     Albumin Level   Date Value Ref Range Status   10/09/2023 4.5 3.4 - 5.0 g/dL Final     Bilirubin Total   Date Value Ref Range Status   10/09/2023 0.6 0.0 - 1.0 mg/dL Final     Alkaline Phosphatase   Date Value Ref Range Status   10/09/2023 69 50 - 144 unit/L Final     Aspartate Aminotransferase   Date Value Ref Range Status   10/09/2023 25 14 - 36 unit/L Final     Alanine Aminotransferase   Date Value Ref Range Status   10/09/2023 19 1 - 45 unit/L Final     eGFR   Date Value Ref Range Status   10/09/2023 67 mls/min/1.73/m2 Final     Comment:                          EGFR INTERPRETATION    Beginning 8/15/22 we are reporting the eGFRcr calculation as recommended by the [employer]. The eGFRcr equation has similar overall  performance characteristics to the older equation, but the values may differ by more than 10% particularly at higher values of eGFRcr and younger adult ages.    NKF stages of chronic kidney disease (CKD)  Stage 1: Kidney damage with normal or increased eGFR (>90 mL/min/1.73 m^2)  Stage 2: Mild reduction in GFR (60-89 mL/min/1.73 m^2)  Stage 3a: Moderate reduction in GFR (45-59 mL/min/1.73 m^2)  Stage 3b: Moderate reduction in GFR (30-44 mL/min/1.73 m^2)  Stage 4: Severe reduction in GFR (15-29 mL/min/1.73 m^2)  Stage 5: Kidney failure (GFR <15 mL/min/1.73 m^2)                 Anesthesia Plan  Type of Anesthesia, risks & benefits discussed:    Anesthesia Type: Gen Natural Airway  Intra-op Monitoring Plan: Standard ASA Monitors  Post Op Pain Control Plan: IV/PO Opioids PRN  (medical reason for not using multimodal pain management)  Induction:  IV  Informed Consent: Informed consent signed with the Patient and all parties understand the risks and agree with anesthesia plan.  All questions answered. Patient consented to blood products? No  ASA Score: 3  Day of Surgery Review of History & Physical: H&P Update referred to the surgeon/provider.    Ready For Surgery From Anesthesia Perspective.     .

## 2024-02-08 ENCOUNTER — HOSPITAL ENCOUNTER (OUTPATIENT)
Facility: HOSPITAL | Age: 64
Discharge: HOME | End: 2024-02-08
Attending: INTERNAL MEDICINE | Admitting: INTERNAL MEDICINE
Payer: MEDICARE

## 2024-02-08 ENCOUNTER — ANESTHESIA (OUTPATIENT)
Dept: ENDOSCOPY | Facility: HOSPITAL | Age: 64
End: 2024-02-08
Payer: MEDICARE

## 2024-02-08 DIAGNOSIS — D12.4 ADENOMATOUS POLYP OF DESCENDING COLON: ICD-10-CM

## 2024-02-08 DIAGNOSIS — K57.30 DIVERTICULOSIS LARGE INTESTINE W/O PERFORATION OR ABSCESS W/O BLEEDING: ICD-10-CM

## 2024-02-08 DIAGNOSIS — Z12.11 SCREENING FOR COLON CANCER: Primary | ICD-10-CM

## 2024-02-08 LAB — POCT GLUCOSE: 157 MG/DL (ref 70–110)

## 2024-02-08 PROCEDURE — 63600175 PHARM REV CODE 636 W HCPCS: Performed by: ANESTHESIOLOGY

## 2024-02-08 PROCEDURE — 45385 COLONOSCOPY W/LESION REMOVAL: CPT | Mod: PT,,, | Performed by: INTERNAL MEDICINE

## 2024-02-08 PROCEDURE — 37000008 HC ANESTHESIA 1ST 15 MINUTES: Performed by: INTERNAL MEDICINE

## 2024-02-08 PROCEDURE — 82962 GLUCOSE BLOOD TEST: CPT | Performed by: INTERNAL MEDICINE

## 2024-02-08 PROCEDURE — 27201423 OPTIME MED/SURG SUP & DEVICES STERILE SUPPLY: Performed by: INTERNAL MEDICINE

## 2024-02-08 PROCEDURE — 45385 COLONOSCOPY W/LESION REMOVAL: CPT | Mod: PT | Performed by: INTERNAL MEDICINE

## 2024-02-08 PROCEDURE — 25000003 PHARM REV CODE 250: Performed by: NURSE ANESTHETIST, CERTIFIED REGISTERED

## 2024-02-08 PROCEDURE — D9220A PRA ANESTHESIA: Mod: PT,,, | Performed by: NURSE ANESTHETIST, CERTIFIED REGISTERED

## 2024-02-08 PROCEDURE — 37000009 HC ANESTHESIA EA ADD 15 MINS: Performed by: INTERNAL MEDICINE

## 2024-02-08 PROCEDURE — 88305 TISSUE EXAM BY PATHOLOGIST: CPT | Mod: TC | Performed by: INTERNAL MEDICINE

## 2024-02-08 PROCEDURE — 63600175 PHARM REV CODE 636 W HCPCS: Performed by: NURSE ANESTHETIST, CERTIFIED REGISTERED

## 2024-02-08 RX ORDER — SODIUM CHLORIDE, SODIUM LACTATE, POTASSIUM CHLORIDE, CALCIUM CHLORIDE 600; 310; 30; 20 MG/100ML; MG/100ML; MG/100ML; MG/100ML
INJECTION, SOLUTION INTRAVENOUS CONTINUOUS
Status: DISCONTINUED | OUTPATIENT
Start: 2024-02-08 | End: 2024-02-08 | Stop reason: HOSPADM

## 2024-02-08 RX ORDER — PROPOFOL 10 MG/ML
INJECTION, EMULSION INTRAVENOUS
Status: DISCONTINUED | OUTPATIENT
Start: 2024-02-08 | End: 2024-02-08

## 2024-02-08 RX ORDER — LIDOCAINE HYDROCHLORIDE 20 MG/ML
INJECTION INTRAVENOUS
Status: DISCONTINUED | OUTPATIENT
Start: 2024-02-08 | End: 2024-02-08

## 2024-02-08 RX ADMIN — PROPOFOL 25 MG: 10 INJECTION, EMULSION INTRAVENOUS at 09:02

## 2024-02-08 RX ADMIN — PROPOFOL 50 MG: 10 INJECTION, EMULSION INTRAVENOUS at 09:02

## 2024-02-08 RX ADMIN — LIDOCAINE HYDROCHLORIDE 50 MG: 20 INJECTION INTRAVENOUS at 09:02

## 2024-02-08 RX ADMIN — SODIUM CHLORIDE, POTASSIUM CHLORIDE, SODIUM LACTATE AND CALCIUM CHLORIDE: 600; 310; 30; 20 INJECTION, SOLUTION INTRAVENOUS at 09:02

## 2024-02-08 RX ADMIN — PROPOFOL 75 MG: 10 INJECTION, EMULSION INTRAVENOUS at 09:02

## 2024-02-08 NOTE — H&P
Colonoscopy History and Physical    Patient Name: Pedro Vergara  MRN: [id number]  : 1960  Date of Procedure:  2024  Referring Physician: Cayla Bruno FNP  Primary Physician: Cayla Bruno FNP  Procedure Physician: Annalisa Knapp MD, MPH     Procedure - Colonoscopy  ASA - per anesthesia  Mallampati - per anesthesia  History of Anesthesia problems - no  Family history Anesthesia problems -  no   Plan of anesthesia - General    Diagnosis: screening for colon cancer  Chief Complaint: Same as above    HPI: Patient is an 63 y.o. female is here for the above. Pt reports this is her third lifetime colonoscopy, last 2 were 10 yrs apart with no pathologic findings on those colonoscopies.    Last colonoscopy: 2014   Family history: CA, HTN, DM  Anticoagulation: None    ROS:  Constitutional: No fevers, chills, No weight loss  CV: No chest pain  Pulm: No cough, No shortness of breath  GI: see HPI    Medical History:   Past Medical History:   Diagnosis Date    Anxiety     CKD (chronic kidney disease)     Depression     Diabetes mellitus, type 2     HLD (hyperlipidemia)     Hypertension     Obesity, unspecified     Unspecified glaucoma          Surgical History:   Past Surgical History:   Procedure Laterality Date    CARPAL TUNNEL RELEASE Left     CARPAL TUNNEL RELEASE Right 2016    COLONOSCOPY  2014    HYSTERECTOMY      tendon sheath incision      TONSILLECTOMY         Family History:   Family History   Problem Relation Age of Onset    Cancer Mother     Hypertension Father     Cancer Father     Diabetes Maternal Grandmother     Diabetes Paternal Grandmother    .    Social History:   Social History     Socioeconomic History    Marital status: Single   Tobacco Use    Smoking status: Never    Smokeless tobacco: Never    Tobacco comments:     None   Substance and Sexual Activity    Alcohol use: Not Currently     Comment: None    Drug use: Never    Sexual activity: Not Currently     Birth  control/protection: None     Social Determinants of Health     Financial Resource Strain: Low Risk  (10/12/2023)    Overall Financial Resource Strain (CARDIA)     Difficulty of Paying Living Expenses: Not very hard   Food Insecurity: No Food Insecurity (10/12/2023)    Hunger Vital Sign     Worried About Running Out of Food in the Last Year: Never true     Ran Out of Food in the Last Year: Never true   Transportation Needs: No Transportation Needs (10/12/2023)    PRAPARE - Transportation     Lack of Transportation (Medical): No     Lack of Transportation (Non-Medical): No   Physical Activity: Sufficiently Active (10/12/2023)    Exercise Vital Sign     Days of Exercise per Week: 7 days     Minutes of Exercise per Session: 90 min   Stress: No Stress Concern Present (10/12/2023)    [employer] [place] [employer] - Occupational Stress Questionnaire     Feeling of Stress : Not at all   Social Connections: Socially Integrated (10/12/2023)    Social Connection and Isolation Panel [NHANES]     Frequency of Communication with Friends and Family: More than three times a week     Frequency of Social Gatherings with Friends and Family: More than three times a week     Attends Temple Services: More than 4 times per year     Active Member of Clubs or Organizations: Yes     Attends Club or Organization Meetings: More than 4 times per year     Marital Status:    Housing Stability: Low Risk  (10/12/2023)    Housing Stability Vital Sign     Unable to Pay for Housing in the Last Year: No     Number of Places Lived in the Last Year: 1     Unstable Housing in the Last Year: No       Review of patient's allergies indicates:   Allergen Reactions    Adhesive tape-silicones        Medications:   Medications Prior to Admission   Medication Sig Dispense Refill Last Dose    aspirin (ECOTRIN) 81 MG EC tablet Take 81 mg by mouth once daily.   Taking    atorvastatin (LIPITOR) 20 MG tablet Take 1 tablet (20 mg total) by mouth  "once daily. 90 tablet 2 Taking    glimepiride (AMARYL) 2 MG tablet TAKE 1 TABLET BY MOUTH EVERY DAY WITH THE FIRST MEAL OF THE DAY 90 tablet 3 Taking    insulin (LANTUS SOLOSTAR U-100 INSULIN) glargine 100 units/mL SubQ pen Inject 18 Units into the skin once daily. 15 each 6 Taking    ipratropium (ATROVENT) 21 mcg (0.03 %) nasal spray INSTILL 1 SPRAY IN EACH NOSTRIL 4 (FOUR) TIMES DAILY AS NEEDED FOR RUNNY NOSE 30 mL 5 Taking    JARDIANCE 25 mg tablet Take 25 mg by mouth once daily.   Taking    lisinopriL (PRINIVIL,ZESTRIL) 5 MG tablet Take 1 tablet (5 mg total) by mouth once daily. 90 tablet 2 Taking    loratadine (CLARITIN) 10 mg tablet Take 1 tablet (10 mg total) by mouth once daily. 30 tablet 11 Taking    metoprolol succinate (TOPROL-XL) 25 MG 24 hr tablet Take 1 tablet (25 mg total) by mouth once daily. 90 tablet 2 Taking    tirzepatide (MOUNJARO) 10 mg/0.5 mL PnIj INJECT 1 SYRINGE SUBCUTANEOUSLY ONCE A WEEK 4 Pen 5 Taking    tirzepatide (MOUNJARO) 12.5 mg/0.5 mL PnIj Inject 12.5 mg into the skin every 7 days. 4 Pen 4 Taking    tretinoin (RETIN-A) 0.05 % cream Apply topically every evening. 45 g 3 Taking    ACCU-CHEK GUIDE TEST STRIPS Strp CHECK GLUCOSE TWICE A DAY       acyclovir 5% (ZOVIRAX) 5 % Crea Apply topically 5 (five) times daily. 5 g 1 Unknown    azelastine (ASTELIN) 137 mcg (0.1 %) nasal spray USE 2 SPRAYS IN EACH NOSTRIL ONCE DAILY 30 mL 5 Unknown    BD NORMA 2ND GEN PEN NEEDLE 32 gauge x 5/32" Ndle USE DAILY TO INJECT INSULIN 100 each 11     BD ULTRA-FINE SHORT PEN NEEDLE 31 gauge x 5/16" Ndle USE DAILY AS DIRECTED       blood sugar diagnostic (ACCU-CHEK GUIDE TEST STRIPS MISC)   ACCU-CHEK GUIDE TEST STRIP, See Instructions, USE ONE STRIP TO TEST BLOOD GLUCOSE TWICE DAILY, # 50 unknown unit, 3 Refill(s), Pharmacy: [facility] STORE [id number], 158, cm, Height/Length Dosing, 11/18/21 13:08:00 CST, 85.9, kg, Weight Dosing, 11/18/21 13:08:00 CST       cyclobenzaprine (FLEXERIL) 10 MG tablet Take 1 tablet (10 mg " "total) by mouth nightly as needed for Muscle spasms. 15 tablet 1 Unknown    diclofenac sodium (VOLTAREN) 1 % Gel APPLY 2 GRAMS TO AFFECTED AREA 3 TIMES A DAY   Unknown    DULoxetine (CYMBALTA) 60 MG capsule Take 60 mg by mouth nightly.   Unknown    fluconazole (DIFLUCAN) 100 MG tablet Take 1 tablet (100 mg total) by mouth Every 3 (three) days. (Patient not taking: Reported on 11/2/2023) 3 tablet 1 Unknown    medical supply, miscellaneous (0.2 MICRON FILTER ATTACHMENT MISC)   BD UF short Pen Needle 8MMx 31g, See Instructions, Use daily to inject insulin' Dx Code E11.9 SAMMIE 99 mon, # 100 EA, 11 Refill(s), Pharmacy: [facility]/pharmacy #[id number], 157, cm, Height/Length Dosing, 06/10/21 13:27:00 CDT, 85.4, kg, Weight Dosing, 06/10/21 1...       mupirocin (BACTROBAN) 2 % ointment Apply topically 3 (three) times daily. (Patient not taking: Reported on 1/3/2024) 22 g 1 Not Taking    nitroGLYCERIN (NITROSTAT) 0.4 MG SL tablet Place 0.4 mg under the tongue.   Unknown    ondansetron (ZOFRAN) 4 MG tablet Take 1 tablet (4 mg total) by mouth every 6 (six) hours. (Patient not taking: Reported on 1/3/2024) 12 tablet 0 Not Taking    pen needle, diabetic 32 gauge x 5/32" Ndle BD yanelis insulin pen needle for once a day injections 90 each 3     polyethylene glycol (MOVIPREP) 100-7.5-2.691 gram solution Take as directed prior to colonoscopy 1 kit 0          Physical Exam:    Vital Signs: There were no vitals filed for this visit.  There were no vitals taken for this visit.    General:          Well appearing in no acute distress  Lungs: Equal chest rise, respirations unlabored  Heart: Regular rate   Abdomen:         Soft, non-tender, non-distended    Labs:  Lab Results   Component Value Date    WBC 8.07 06/05/2023    HGB 13.4 06/05/2023    HCT 41.4 06/05/2023    MCV 88.8 06/05/2023     06/05/2023     Lab Results   Component Value Date    INR 0.99 06/03/2021     Lab Results   Component Value Date     10/09/2023    K 3.8 10/09/2023    " CO2 29 10/09/2023    BUN 8.0 10/09/2023    CREATININE 0.95 10/09/2023    LABPROT 7.6 10/09/2023    ALBUMIN 4.5 10/09/2023    BILITOT 0.6 10/09/2023    ALKPHOS 69 10/09/2023    ALT 19 10/09/2023    AST 25 10/09/2023         Assessment and Plan:    History reviewed, vital signs satisfactory, cardiopulmonary status satisfactory.  I have explained the sedation options, risks, benefits, and alternatives of this endoscopic procedure to the patient including but not limited to bleeding, inflammation, infection, perforation, and death.  All questions were answered and the patient consented to proceed with procedure as planned.   The patient is deemed an appropriate candidate for the sedation as planned.      Laurie Mcgill MD  [facility]-PGY-2  2/8/2024  9:05 AM

## 2024-02-08 NOTE — PLAN OF CARE
Pt in pre op, aaox4 with sister at bedside.  Call bell given with instructions to call with issues, verbalized understanding.  Will continue to monitor.

## 2024-02-08 NOTE — PROVATION PATIENT INSTRUCTIONS
Discharge Summary/Instructions after an Endoscopic Procedure  Patient Name: Pedro Vergara  Patient MRN: [id number]  Patient YOB: 1960 Thursday, February 8, 2024  Mike Keller MD  Dear patient,  As a result of recent federal legislation (The Federal Cures Act), you may   receive lab or pathology results from your procedure in your [facility]   account before your physician is able to contact you. Your physician or   their representative will relay the results to you with their   recommendations at their soonest availability.  Thank you,  RESTRICTIONS:  During your procedure today, you received medications for sedation.  These   medications may affect your judgment, balance and coordination.  Therefore,   for 24 hours, you have the following restrictions:   - DO NOT drive a car, operate machinery, make legal/financial decisions,   sign important papers or drink alcohol.    ACTIVITY:  Today: no heavy lifting, straining or running due to procedural   sedation/anesthesia.  The following day: return to full activity including work.  DIET:  Eat and drink normally unless instructed otherwise.     TREATMENT FOR COMMON SIDE EFFECTS:  - Mild abdominal pain, nausea, belching, bloating or excessive gas:  rest,   eat lightly and use a heating pad.  - Sore Throat: treat with throat lozenges and/or gargle with warm salt   water.  - Because air was used during the procedure, expelling large amounts of air   from your rectum or belching is normal.  - If a bowel prep was taken, you may not have a bowel movement for 1-3 days.    This is normal.  SYMPTOMS TO WATCH FOR AND REPORT TO YOUR PHYSICIAN:  1. Abdominal pain or bloating, other than gas cramps.  2. Chest pain.  3. Back pain.  4. Signs of infection such as: chills or fever occurring within 24 hours   after the procedure.  5. Rectal bleeding, which would show as bright red, maroon, or black stools.   (A tablespoon of blood from the rectum is not serious, especially  if   hemorrhoids are present.)  6. Vomiting.  7. Weakness or dizziness.  GO DIRECTLY TO THE NEAREST EMERGENCY ROOM IF YOU HAVE ANY OF THE FOLLOWING:      Difficulty breathing              Chills and/or fever over 101 F   Persistent vomiting and/or vomiting blood   Severe abdominal pain   Severe chest pain   Black, tarry stools   Bleeding- more than one tablespoon   Any other symptom or condition that you feel may need urgent attention  Your doctor recommends these additional instructions:  If any biopsies were taken, your doctors clinic will contact you in 1 to 2   weeks with any results.  - Discharge patient to home (ambulatory).   - Resume previous diet today.   - Repeat colonoscopy in 5 years for surveillance.  For questions, problems or results please call your physician - Mike Keller MD at Work:  [phone].  [facility] , EMERGENCY ROOM PHONE NUMBER: [phone]  IF A COMPLICATION OR EMERGENCY SITUATION ARISES AND YOU ARE UNABLE TO REACH   YOUR PHYSICIAN - GO DIRECTLY TO THE EMERGENCY ROOM.  MD Mike Nation MD  2/8/2024 10:08:35 AM  This report has been verified and signed electronically.  Dear patient,  As a result of recent federal legislation (The Federal Cures Act), you may   receive lab or pathology results from your procedure in your [facility]   account before your physician is able to contact you. Your physician or   their representative will relay the results to you with their   recommendations at their soonest availability.  Thank you,  PROVATION

## 2024-02-08 NOTE — TRANSFER OF CARE
Anesthesia Transfer of Care Note    Patient: Pedro Vergara    Procedure(s) Performed: Procedure(s) (LRB):  COLONOSCOPY, WITH POLYPECTOMY USING SNARE (N/A)    Patient location: GI    Anesthesia Type: general    Post pain: adequate analgesia    Post assessment: no apparent anesthetic complications    Post vital signs: stable    Level of consciousness: awake    Nausea/Vomiting: no nausea/vomiting    Complications: none    Transfer of care protocol was followedComments: Report to Jose ORELLANA      Last vitals: 112/68 p93 sat 100 r 18 t 36

## 2024-02-08 NOTE — ANESTHESIA POSTPROCEDURE EVALUATION
Anesthesia Post Evaluation    Patient: Pedro Vergara    Procedure(s) Performed: Procedure(s) (LRB):  COLONOSCOPY, WITH POLYPECTOMY USING SNARE (N/A)    Final Anesthesia Type: general      Patient location during evaluation: GI PACU  Patient participation: Yes- Able to Participate  Level of consciousness: awake and alert  Post-procedure vital signs: reviewed and stable  Pain management: adequate  Airway patency: patent    PONV status at discharge: No PONV  Anesthetic complications: no      Cardiovascular status: hemodynamically stable  Respiratory status: unassisted, spontaneous ventilation and room air  Hydration status: euvolemic  Follow-up not needed.              Vitals Value Taken Time   /86 02/08/24 0906   Temp 36.9 °C (98.4 °F) 02/08/24 0906   Pulse 75 02/08/24 0906   Resp 16 02/08/24 0906   SpO2 100 % 02/08/24 0906         No case tracking events are documented in the log.      Pain/Riddhi Score: No data recorded

## 2024-02-08 NOTE — DISCHARGE SUMMARY
[facility]  Discharge Note  Short Stay    Procedure(s) (LRB):  COLONOSCOPY, WITH POLYPECTOMY USING SNARE (N/A)  Procedure of colonoscopy was explained to the patient in detail and consent obtained.  The patient was transferred to the endoscopy suite, general IV anesthesia was provided by anesthesia Services.  Rectal exam was performed and normal.  The Olympus videocolonoscope was introduced per rectum and advanced around the colon to the cecum.  The ileocecal valve and appendiceal orifice were identified and normal.  Careful examination of the ascending and transverse colon was normal.  In the mid descending colon a 5 mm sessile polyp was identified and removed with cold snare.  In the distal descending colon and sigmoid colon several varying size diverticula were noted.  The rectum was normal including retroflexed view.  The endoscope was withdrawn.  Withdrawal time cecum to rectum 18 minutes.      Discharge plan-the patient will resume her normal diet today and normal activities tomorrow.  She will be advised of pathology report when available and if the polyp removed was an adenoma she will return in 5 years for follow-up colonoscopy.    OUTCOME: Patient tolerated treatment/procedure well without complication and is now ready for discharge.    DISPOSITION: Home or Self Care    FINAL DIAGNOSIS:  <principal problem not specified>    FOLLOWUP: With primary care provider    DISCHARGE INSTRUCTIONS:    Discharge Procedure Orders   Diet Adult Regular     Diet general     Notify your health care provider if you experience any of the following:  severe uncontrolled pain     Call MD for:  temperature >100.4     Call MD for:  persistent nausea and vomiting     Call MD for:  severe uncontrolled pain     Call MD for:  difficulty breathing, headache or visual disturbances     Activity as tolerated        TIME SPENT ON DISCHARGE: 10 minutes

## 2024-02-09 VITALS
HEART RATE: 70 BPM | DIASTOLIC BLOOD PRESSURE: 79 MMHG | TEMPERATURE: 98 F | HEIGHT: 62 IN | RESPIRATION RATE: 18 BRPM | BODY MASS INDEX: 30.51 KG/M2 | SYSTOLIC BLOOD PRESSURE: 127 MMHG | WEIGHT: 165.81 LBS | OXYGEN SATURATION: 100 %

## 2024-02-09 LAB
ESTROGEN SERPL-MCNC: NORMAL PG/ML
INSULIN SERPL-ACNC: NORMAL U[IU]/ML
LAB AP CLINICAL INFORMATION: NORMAL
LAB AP GROSS DESCRIPTION: NORMAL
LAB AP REPORT FOOTNOTES: NORMAL
T3RU NFR SERPL: NORMAL %

## 2024-02-12 ENCOUNTER — LAB VISIT (OUTPATIENT)
Dept: LAB | Facility: HOSPITAL | Age: 64
End: 2024-02-12
Attending: NURSE PRACTITIONER
Payer: MEDICARE

## 2024-02-12 DIAGNOSIS — Z79.4 TYPE 2 DIABETES MELLITUS WITHOUT COMPLICATION, WITH LONG-TERM CURRENT USE OF INSULIN: Chronic | ICD-10-CM

## 2024-02-12 DIAGNOSIS — E78.2 MIXED HYPERLIPIDEMIA: Chronic | ICD-10-CM

## 2024-02-12 DIAGNOSIS — E11.9 TYPE 2 DIABETES MELLITUS WITHOUT COMPLICATION, WITH LONG-TERM CURRENT USE OF INSULIN: Chronic | ICD-10-CM

## 2024-02-12 DIAGNOSIS — I10 PRIMARY HYPERTENSION: ICD-10-CM

## 2024-02-12 LAB
ALBUMIN SERPL-MCNC: 4 G/DL (ref 3.4–5)
ALBUMIN/GLOB SERPL: 1.2 RATIO
ALP SERPL-CCNC: 80 UNIT/L (ref 50–144)
ALT SERPL-CCNC: 19 UNIT/L (ref 1–45)
ANION GAP SERPL CALC-SCNC: 8 MEQ/L (ref 2–13)
AST SERPL-CCNC: 28 UNIT/L (ref 14–36)
BASOPHILS # BLD AUTO: 0.06 X10(3)/MCL (ref 0.01–0.08)
BASOPHILS NFR BLD AUTO: 0.7 % (ref 0.1–1.2)
BILIRUB SERPL-MCNC: 0.5 MG/DL (ref 0–1)
BUN SERPL-MCNC: 13 MG/DL (ref 7–20)
CALCIUM SERPL-MCNC: 9.2 MG/DL (ref 8.4–10.2)
CHLORIDE SERPL-SCNC: 107 MMOL/L (ref 98–110)
CHOLEST SERPL-MCNC: 152 MG/DL (ref 0–200)
CO2 SERPL-SCNC: 25 MMOL/L (ref 21–32)
CREAT SERPL-MCNC: 1.04 MG/DL (ref 0.66–1.25)
CREAT/UREA NIT SERPL: 13 (ref 12–20)
EOSINOPHIL # BLD AUTO: 0.22 X10(3)/MCL (ref 0.04–0.36)
EOSINOPHIL NFR BLD AUTO: 2.7 % (ref 0.7–7)
ERYTHROCYTE [DISTWIDTH] IN BLOOD BY AUTOMATED COUNT: 12.7 % (ref 11–14.5)
EST. AVERAGE GLUCOSE BLD GHB EST-MCNC: 151.3 MG/DL (ref 70–115)
GFR SERPLBLD CREATININE-BSD FMLA CKD-EPI: 60 MLS/MIN/1.73/M2
GLOBULIN SER-MCNC: 3.3 GM/DL (ref 2–3.9)
GLUCOSE SERPL-MCNC: 177 MG/DL (ref 70–115)
HBA1C MFR BLD: 6.9 % (ref 4–6)
HCT VFR BLD AUTO: 39.5 % (ref 36–48)
HDLC SERPL-MCNC: 52 MG/DL (ref 40–60)
HGB BLD-MCNC: 13.2 G/DL (ref 11.8–16)
IMM GRANULOCYTES # BLD AUTO: 0.02 X10(3)/MCL (ref 0–0.03)
IMM GRANULOCYTES NFR BLD AUTO: 0.2 % (ref 0–0.5)
LDLC SERPL DIRECT ASSAY-SCNC: 73.9 MG/DL (ref 30–100)
LYMPHOCYTES # BLD AUTO: 2.88 X10(3)/MCL (ref 1.16–3.74)
LYMPHOCYTES NFR BLD AUTO: 35.6 % (ref 20–55)
MCH RBC QN AUTO: 29.1 PG (ref 27–34)
MCHC RBC AUTO-ENTMCNC: 33.4 G/DL (ref 31–37)
MCV RBC AUTO: 87 FL (ref 79–99)
MONOCYTES # BLD AUTO: 0.54 X10(3)/MCL (ref 0.24–0.36)
MONOCYTES NFR BLD AUTO: 6.7 % (ref 4.7–12.5)
NEUTROPHILS # BLD AUTO: 4.38 X10(3)/MCL (ref 1.56–6.13)
NEUTROPHILS NFR BLD AUTO: 54.1 % (ref 37–73)
NRBC BLD AUTO-RTO: 0 %
PLATELET # BLD AUTO: 279 X10(3)/MCL (ref 140–371)
PMV BLD AUTO: 10.4 FL (ref 9.4–12.4)
POTASSIUM SERPL-SCNC: 3.6 MMOL/L (ref 3.5–5.1)
PROT SERPL-MCNC: 7.3 GM/DL (ref 6.3–8.2)
RBC # BLD AUTO: 4.54 X10(6)/MCL (ref 4–5.1)
SODIUM SERPL-SCNC: 140 MMOL/L (ref 135–145)
TRIGL SERPL-MCNC: 124 MG/DL (ref 30–200)
WBC # SPEC AUTO: 8.1 X10(3)/MCL (ref 4–11.5)

## 2024-02-12 PROCEDURE — 80053 COMPREHEN METABOLIC PANEL: CPT

## 2024-02-12 PROCEDURE — 36415 COLL VENOUS BLD VENIPUNCTURE: CPT

## 2024-02-12 PROCEDURE — 83036 HEMOGLOBIN GLYCOSYLATED A1C: CPT

## 2024-02-12 PROCEDURE — 80061 LIPID PANEL: CPT

## 2024-02-12 PROCEDURE — 85025 COMPLETE CBC W/AUTO DIFF WBC: CPT

## 2024-02-15 ENCOUNTER — OFFICE VISIT (OUTPATIENT)
Dept: INTERNAL MEDICINE | Facility: CLINIC | Age: 64
End: 2024-02-15
Payer: MEDICARE

## 2024-02-15 VITALS
SYSTOLIC BLOOD PRESSURE: 117 MMHG | HEART RATE: 64 BPM | HEIGHT: 62 IN | BODY MASS INDEX: 31.28 KG/M2 | TEMPERATURE: 98 F | WEIGHT: 170 LBS | OXYGEN SATURATION: 99 % | DIASTOLIC BLOOD PRESSURE: 73 MMHG | RESPIRATION RATE: 18 BRPM

## 2024-02-15 DIAGNOSIS — I10 PRIMARY HYPERTENSION: Primary | Chronic | ICD-10-CM

## 2024-02-15 DIAGNOSIS — E11.65 TYPE 2 DIABETES MELLITUS WITH HYPERGLYCEMIA, WITH LONG-TERM CURRENT USE OF INSULIN: Chronic | ICD-10-CM

## 2024-02-15 DIAGNOSIS — Z79.4 TYPE 2 DIABETES MELLITUS WITH HYPERGLYCEMIA, WITH LONG-TERM CURRENT USE OF INSULIN: Chronic | ICD-10-CM

## 2024-02-15 DIAGNOSIS — E66.09 CLASS 1 OBESITY DUE TO EXCESS CALORIES WITH SERIOUS COMORBIDITY AND BODY MASS INDEX (BMI) OF 31.0 TO 31.9 IN ADULT: Chronic | ICD-10-CM

## 2024-02-15 DIAGNOSIS — Z12.31 ENCOUNTER FOR SCREENING MAMMOGRAM FOR MALIGNANT NEOPLASM OF BREAST: ICD-10-CM

## 2024-02-15 DIAGNOSIS — E78.2 MIXED HYPERLIPIDEMIA: Chronic | ICD-10-CM

## 2024-02-15 DIAGNOSIS — Z78.0 POST-MENOPAUSE: ICD-10-CM

## 2024-02-15 PROCEDURE — 99215 OFFICE O/P EST HI 40 MIN: CPT | Mod: PBBFAC | Performed by: NURSE PRACTITIONER

## 2024-02-15 PROCEDURE — 99214 OFFICE O/P EST MOD 30 MIN: CPT | Mod: S$PBB,,, | Performed by: NURSE PRACTITIONER

## 2024-02-15 RX ORDER — CYCLOBENZAPRINE HCL 10 MG
10 TABLET ORAL NIGHTLY PRN
Qty: 15 TABLET | Refills: 1 | Status: SHIPPED | OUTPATIENT
Start: 2024-02-15

## 2024-02-15 RX ORDER — MONTELUKAST SODIUM 10 MG/1
10 TABLET ORAL
COMMUNITY
Start: 2023-12-11

## 2024-02-15 RX ORDER — FLUTICASONE PROPIONATE 50 MCG
SPRAY, SUSPENSION (ML) NASAL
COMMUNITY
Start: 2023-10-31

## 2024-02-15 RX ORDER — ALBUTEROL SULFATE 90 UG/1
AEROSOL, METERED RESPIRATORY (INHALATION)
COMMUNITY
Start: 2023-11-01

## 2024-02-15 RX ORDER — ERGOCALCIFEROL 1.25 MG/1
50000 CAPSULE ORAL
COMMUNITY
Start: 2024-01-11

## 2024-02-15 RX ORDER — LISINOPRIL 5 MG/1
5 TABLET ORAL DAILY
Qty: 90 TABLET | Refills: 2 | Status: SHIPPED | OUTPATIENT
Start: 2024-02-15

## 2024-02-15 RX ORDER — OMEPRAZOLE 40 MG/1
40 CAPSULE, DELAYED RELEASE ORAL
COMMUNITY
Start: 2023-12-11 | End: 2024-03-12 | Stop reason: ALTCHOICE

## 2024-02-15 RX ORDER — MUPIROCIN 20 MG/G
OINTMENT TOPICAL 3 TIMES DAILY
Qty: 22 G | Refills: 1 | Status: SHIPPED | OUTPATIENT
Start: 2024-02-15

## 2024-02-15 RX ORDER — METOPROLOL SUCCINATE 25 MG/1
25 TABLET, EXTENDED RELEASE ORAL DAILY
Qty: 90 TABLET | Refills: 2 | Status: SHIPPED | OUTPATIENT
Start: 2024-02-15

## 2024-02-15 RX ORDER — INHALER,ASSIST DEV,SMALL MASK
SPACER (EA) MISCELLANEOUS
COMMUNITY
Start: 2023-10-16

## 2024-02-15 RX ORDER — ATORVASTATIN CALCIUM 20 MG/1
20 TABLET, FILM COATED ORAL DAILY
Qty: 90 TABLET | Refills: 2 | Status: SHIPPED | OUTPATIENT
Start: 2024-02-15

## 2024-02-15 NOTE — DISCHARGE SUMMARY
Neurocritical Care Code Stroke Documentation      Symptoms: Right arm weakness, inability to speak   Baseline mRS:   0   Last Known Well: 1800   Medical hx: No past medical history on file.   Anticoagulation:  None reported   VAN:   Positive   NIHSS:   1a-LOC:2    1b-Month/Age:2    1c-Open/Close Hand:2    2-Best Gaze:1    3-Visual Fields:0    4-Facial Palsy:1    5a-Left Arm:0    5b-Right Arm: 2    6a-Left Le    6b-Right Leg:3    7-Limb Ataxia: 0    8-Sensory:0    9-Best Language:3    10-Dysarthria:2    11-Extinction/Inattention:1  TOTAL SCORE: 19   Imaging:   CT - No acute process seen. Negative for hemorrhage    CTA - Distal left ICA occlusion,     CTP - Left occipital perfusion mismatch   Plan:   TNK Candidate: YES    Mechanical thrombectomy Candidate: YES    Pt arrives to [facility] ED via EMS.  His live-in friend/partner reports that he attempted to go to the bathroom at 1830 at which time the pt began shaking his right arm and was unable to speak.  Limited medical history per chart review.  Assessed w/ teleneurology who recommends TNK administration - given at 1930.  Concern for seizure vs stroke per teleneurology however CTA & CTP reveal evidence of stroke in left occipital lobe. Recommendation for cerebral angiogram w/ mechanical thrombectomy and admission to ICU for post-TNK work up.     *Perform dysphagia screening prior to any PO intake*    Discussed with: Dr. Alatorre, teleneurology    I have discussed the risks, benefits and alternatives of the cerebral angiogram with the patient's decision maker in detail. The risks discussed included but were not limited to bleeding, infection, blood vessel damage/dissection, stroke with transient or permanent weakness, numbness or other deficit, speech and language dysfunction, vision loss, brain damage and death. Risk of renal failure from contrast or allergic reaction to contrast dye was also discussed. All questions were answered thoroughly. The patient's decision maker  [facility]  Discharge Note  Short Stay    Procedure(s) (LRB):  COLONOSCOPY, WITH POLYPECTOMY USING SNARE (N/A)  The procedure of colonoscopy explained to the patient and consent obtained.  The patient was transferred to the endoscopy suite.  General IV anesthesia was provided by anesthesia Services.  Rectal exam normal.  The Olympus videocolonoscope was introduced per rectum and advanced around the colon to the cecum.  The ileocecal valve and appendiceal orifice were identified and normal.  The ascending and transverse colon was normal.  A 4 mm sessile polyp was present in the proximal descending colon and removed with cold snare.  The sigmoid colon and rectum was normal.  The endoscope was withdrawn.  Withdrawal time cecum to rectum 16 minutes.    Discharge plan the patient will resume her regular diet today and normal activities tomorrow.  A follow-up colonoscopy in 5 years is recommended.  OUTCOME: Patient tolerated treatment/procedure well without complication and is now ready for discharge.    DISPOSITION: Home or Self Care    FINAL DIAGNOSIS:  <principal problem not specified>    FOLLOWUP: In clinic    DISCHARGE INSTRUCTIONS:    Discharge Procedure Orders   Diet Adult Regular     Diet general     Notify your health care provider if you experience any of the following:  severe uncontrolled pain     Call MD for:  temperature >100.4     Call MD for:  persistent nausea and vomiting     Call MD for:  severe uncontrolled pain     Call MD for:  difficulty breathing, headache or visual disturbances     Activity as tolerated        TIME SPENT ON DISCHARGE: 5 minutes

## 2024-02-20 ENCOUNTER — DOCUMENTATION ONLY (OUTPATIENT)
Dept: INTERNAL MEDICINE | Facility: CLINIC | Age: 64
End: 2024-02-20
Payer: MEDICARE

## 2024-02-21 ENCOUNTER — TELEPHONE (OUTPATIENT)
Dept: INTERNAL MEDICINE | Facility: CLINIC | Age: 64
End: 2024-02-21
Payer: MEDICARE

## 2024-02-21 NOTE — TELEPHONE ENCOUNTER
----- Message from Caprice Wilson sent at 2/21/2024 10:30 AM CST -----  Type:  Needs Medical Advice    Who Called: pt     Best Call Back Number: [phone]  Additional Information: would like a call from nurse to discuss ,medication change. ( metoprolol succinate (TOPROL-XL) 25 MG 24 hr tablet )

## 2024-02-22 NOTE — DISCHARGE SUMMARY
[facility]  Discharge Note  Short Stay    Procedure(s) (LRB):  COLONOSCOPY, WITH POLYPECTOMY USING SNARE (N/A)  Procedure colonoscopy was explained to the patient in detail and consent obtained.  The patient was transferred to the endoscopy suite, general IV anesthesia was provided by anesthesia Services.  Rectal exam was performed and normal.  The Olympus videocolonoscope was introduced per rectum and advanced around the colon to the cecum.  The ileocecal valve and appendiceal orifice were identified and normal.  The ascending colon transverse colon were normal.  In the mid descending colon a 5 mm sessile polyp was identified and removed with cold snare.  There were a few scattered diverticula present in the distal descending and sigmoid colon with no evidence of diverticulitis.  The rectum was normal.  The endoscope was withdrawn.  Withdrawal time cecum to rectum 15 minutes.  Procedure was well tolerated and the patient returned to the recovery area for observation.    OUTCOME: Patient tolerated treatment/procedure well without complication and is now ready for discharge.    DISPOSITION: Home or Self Care    FINAL DIAGNOSIS:  <principal problem not specified>    FOLLOWUP: In clinic    DISCHARGE INSTRUCTIONS:    Discharge Procedure Orders   Diet Adult Regular     Diet general     Notify your health care provider if you experience any of the following:  severe uncontrolled pain     Call MD for:  temperature >100.4     Call MD for:  persistent nausea and vomiting     Call MD for:  severe uncontrolled pain     Call MD for:  difficulty breathing, headache or visual disturbances     Activity as tolerated        TIME SPENT ON DISCHARGE: 10 minutes

## 2024-02-22 NOTE — DISCHARGE SUMMARY
[facility]  Discharge Note  Short Stay    Procedure(s) (LRB):  COLONOSCOPY, WITH POLYPECTOMY USING SNARE (N/A)  Procedure of colonoscopy was explained to the patient in detail and consent obtained.  The patient was transferred to the endoscopy suite, general IV anesthesia was provided by anesthesia Services.  The Olympus videocolonoscope was introduced per rectum advanced around the colon to the cecum.  The ileocecal valve and appendiceal orifice were identified normal.  The ascending colon was carefully examined and noted be normal as was the transverse colon and proximal descending colon.  In the mid descending colon a 5 mm sessile polyp was identified and removed with cold snare, the remainder of the descending colon was normal other than a few scattered varying size diverticula.  Diverticula also present in the sigmoid colon with no evidence of diverticulitis.  The rectum was normal.  The endoscope was withdrawn.  Withdrawal time cecum to rectum 17 minutes.  The procedure was well tolerated and the patient returned to the recovery area for observation.    OUTCOME: Patient tolerated treatment/procedure well without complication and is now ready for discharge.    DISPOSITION: Home or Self Care    FINAL DIAGNOSIS:  <principal problem not specified>    FOLLOWUP: In clinic    DISCHARGE INSTRUCTIONS:    Discharge Procedure Orders   Diet Adult Regular     Diet general     Notify your health care provider if you experience any of the following:  severe uncontrolled pain     Call MD for:  temperature >100.4     Call MD for:  persistent nausea and vomiting     Call MD for:  severe uncontrolled pain     Call MD for:  difficulty breathing, headache or visual disturbances     Activity as tolerated        TIME SPENT ON DISCHARGE: 10 minutes

## 2024-03-01 ENCOUNTER — HOSPITAL ENCOUNTER (OUTPATIENT)
Dept: RADIOLOGY | Facility: HOSPITAL | Age: 64
Discharge: HOME | End: 2024-03-01
Attending: NURSE PRACTITIONER
Payer: MEDICARE

## 2024-03-01 DIAGNOSIS — Z78.0 POST-MENOPAUSE: ICD-10-CM

## 2024-03-01 DIAGNOSIS — Z12.31 ENCOUNTER FOR SCREENING MAMMOGRAM FOR MALIGNANT NEOPLASM OF BREAST: ICD-10-CM

## 2024-03-01 DIAGNOSIS — E11.65 TYPE 2 DIABETES MELLITUS WITH HYPERGLYCEMIA: ICD-10-CM

## 2024-03-01 PROCEDURE — 77080 DXA BONE DENSITY AXIAL: CPT | Mod: TC

## 2024-03-01 PROCEDURE — 77067 SCR MAMMO BI INCL CAD: CPT | Mod: 26,,, | Performed by: RADIOLOGY

## 2024-03-01 PROCEDURE — 77067 SCR MAMMO BI INCL CAD: CPT | Mod: TC

## 2024-03-01 PROCEDURE — 77063 BREAST TOMOSYNTHESIS BI: CPT | Mod: 26,,, | Performed by: RADIOLOGY

## 2024-03-01 RX ORDER — GLIMEPIRIDE 2 MG/1
TABLET ORAL
Qty: 90 TABLET | Refills: 2 | Status: SHIPPED | OUTPATIENT
Start: 2024-03-01

## 2024-03-01 RX ORDER — BLOOD SUGAR DIAGNOSTIC
STRIP MISCELLANEOUS
Qty: 60 STRIP | Refills: 11 | Status: SHIPPED | OUTPATIENT
Start: 2024-03-01 | End: 2024-03-04 | Stop reason: SDUPTHER

## 2024-03-01 NOTE — TELEPHONE ENCOUNTER
----- Message from Gracie Barnes sent at 2/29/2024  2:14 PM CST -----  Pt milton Hernandez    Pt called in requesting a refill on medication glimepiride and test strips. Pt has a new pharmacy. Pt want medication to go to [facility]  Pharmacy in [place].   Pharmacy number  [phone]      Please advise

## 2024-03-01 NOTE — TELEPHONE ENCOUNTER
----- Message from Bailey Ramon sent at 3/1/2024  9:46 AM CST -----  Patient of Mary    Patient is requesting refills on glimepride and test strips    [phone]    Patient uses [facility]  [facility]armacy in [facility]

## 2024-03-04 ENCOUNTER — TELEPHONE (OUTPATIENT)
Dept: ENDOCRINOLOGY | Facility: CLINIC | Age: 64
End: 2024-03-04
Payer: MEDICARE

## 2024-03-04 DIAGNOSIS — E11.65 TYPE 2 DIABETES MELLITUS WITH HYPERGLYCEMIA: ICD-10-CM

## 2024-03-04 RX ORDER — BLOOD SUGAR DIAGNOSTIC
STRIP MISCELLANEOUS
Qty: 30 STRIP | Refills: 11 | Status: SHIPPED | OUTPATIENT
Start: 2024-03-04

## 2024-03-04 NOTE — TELEPHONE ENCOUNTER
Mary CLARKE received a fax from [facility] in [place] needing to clarify directions for test strips. Rx sent over stated use bid to check cbg however progress note states tesing qd. Medicare part B covers daily testing for noninsulin dependent.     Please clarify if cbg is to be checked qd or bid

## 2024-03-07 NOTE — DISCHARGE SUMMARY
[facility] - Endoscopy  Discharge Note  Short Stay    Procedure(s) (LRB):  COLONOSCOPY, WITH POLYPECTOMY USING SNARE (N/A)  The procedure of colonoscopy was explained to the patient and consent obtained, the patient was transferred to the endoscopy suite.  General IV anesthesia was provided by anesthesia Services.  Rectal exam was performed and normal.  The Olympus videocolonoscope was introduced per rectum and advanced around the colon to the cecum.  The ileocecal valve and appendiceal orifice were identified and normal.  Careful examination of the ascending and transverse colon were normal.  In the descending colon a 4 mm sessile polyp was identified removed with cold snare, the remainder of the descending colon was normal.  The sigmoid colon was unremarkable as was the rectum.  The endoscope was withdrawn.  Withdrawal time cecum to rectum 16 minutes.  The procedure was well tolerated and the patient returned to the recovery area for observation    Discharge plan time of discharge is patient is awake alert with stable vital signs.  Abdomen is soft and nontender.  The patient will be discharged to resume diet as tolerated today and normal activities tomorrow.  A follow-up colonoscopy in 5 years is recommended.    OUTCOME: Patient tolerated treatment/procedure well without complication and is now ready for discharge.    DISPOSITION: Home or Self Care    FINAL DIAGNOSIS:  <principal problem not specified>    FOLLOWUP: With primary care provider    DISCHARGE INSTRUCTIONS:    Discharge Procedure Orders   Diet Adult Regular     Diet general     Notify your health care provider if you experience any of the following:  severe uncontrolled pain     Call MD for:  temperature >100.4     Call MD for:  persistent nausea and vomiting     Call MD for:  severe uncontrolled pain     Call MD for:  difficulty breathing, headache or visual disturbances     Activity as tolerated        TIME SPENT ON DISCHARGE: 10 minutes

## 2024-03-12 ENCOUNTER — OFFICE VISIT (OUTPATIENT)
Dept: OTOLARYNGOLOGY | Facility: CLINIC | Age: 64
End: 2024-03-12
Payer: MEDICARE

## 2024-03-12 ENCOUNTER — CLINICAL SUPPORT (OUTPATIENT)
Dept: AUDIOLOGY | Facility: HOSPITAL | Age: 64
End: 2024-03-12
Payer: MEDICARE

## 2024-03-12 VITALS — TEMPERATURE: 98 F | HEART RATE: 73 BPM | SYSTOLIC BLOOD PRESSURE: 117 MMHG | DIASTOLIC BLOOD PRESSURE: 77 MMHG

## 2024-03-12 DIAGNOSIS — H90.3 SENSORINEURAL HEARING LOSS (SNHL) OF BOTH EARS: ICD-10-CM

## 2024-03-12 DIAGNOSIS — M79.18 MYOFASCIAL PAIN: ICD-10-CM

## 2024-03-12 DIAGNOSIS — J30.9 ALLERGIC RHINITIS, UNSPECIFIED SEASONALITY, UNSPECIFIED TRIGGER: Primary | ICD-10-CM

## 2024-03-12 DIAGNOSIS — J31.0 CHRONIC RHINITIS: ICD-10-CM

## 2024-03-12 PROCEDURE — 99214 OFFICE O/P EST MOD 30 MIN: CPT | Mod: PBBFAC,25 | Performed by: NURSE PRACTITIONER

## 2024-03-12 PROCEDURE — 99213 OFFICE O/P EST LOW 20 MIN: CPT | Mod: S$PBB,,, | Performed by: NURSE PRACTITIONER

## 2024-03-12 PROCEDURE — 92567 TYMPANOMETRY: CPT | Performed by: AUDIOLOGIST-HEARING AID FITTER

## 2024-03-12 PROCEDURE — 92557 COMPREHENSIVE HEARING TEST: CPT | Performed by: AUDIOLOGIST-HEARING AID FITTER

## 2024-03-12 RX ORDER — LISINOPRIL 10 MG/1
10 TABLET ORAL
COMMUNITY
Start: 2024-02-27

## 2024-03-12 RX ORDER — PANTOPRAZOLE SODIUM 40 MG/1
40 TABLET, DELAYED RELEASE ORAL DAILY
Qty: 90 TABLET | Refills: 4 | Status: SHIPPED | OUTPATIENT
Start: 2024-03-12 | End: 2025-06-05

## 2024-03-12 RX ORDER — TRIAMCINOLONE ACETONIDE 5 MG/G
CREAM TOPICAL 2 TIMES DAILY
COMMUNITY
Start: 2024-02-19

## 2024-03-12 RX ORDER — FAMOTIDINE 40 MG/1
40 TABLET, FILM COATED ORAL NIGHTLY
Qty: 90 TABLET | Refills: 4 | Status: SHIPPED | OUTPATIENT
Start: 2024-03-12 | End: 2025-06-05

## 2024-03-12 NOTE — PROGRESS NOTES
Audiological Evaluation    Patient History:    Patient evaluated today to assess hearing.  She reportedly does not perceive any changes in hearing since the previous evaluation in 2023.   Tinnitus, otalgia, otorrhea and a history of middle ear involvement/otologic procedures have been denied at this time.  Patient's medical history has reportedly not changed since most recent medical evaluation.       Pure Tone Testing:    Right ear:       Normal to mild, HFSNHL (@ 8000 Hz)    Left ear:          Normal to moderate, HFSNHL (@ 6000 and 8000 Hz)        Tympanometry:      Right ear:   Type 'A' tympanogram    Left ear: Type 'A' tympanogram           Acoustic Reflex Testing    Right ear:   Did not test     Left ear: Did not test        DPOAE Testing    Right ear: Present emissions-1000, 2000, 3000 and 4000 Hz    Left ear: Present emissions-1000, 2000, 3000 and 4000 Hz    Interpretations:    Pure tone testing revealed normal to mild, high frequency sensorineural hearing loss (predominantly at 8000 Hz) for the right ear.  Testing for the left ear revealed normal to moderate, high frequency sensorineural hearing loss (predominantly at 6000 and 8000 Hz).  Speech reception thresholds were obtained at 20 dB HL consistent with pure tone results, bilaterally.  Word recognition scores were excellent, bilaterally.  Immittance testing revealed Type A tympanograms, bilaterally, indicative of normal middle ear function. Otoscopy revealed clear EACs, bilaterally.      Recommendations:     Audiological testing annually  ENT evaluation per ENT  Hearing protection when exposed to hazardous noise    Jacobo Gant.  Clinical Audiologist

## 2024-03-12 NOTE — PROGRESS NOTES
"[facility]  Otolaryngology Clinic Note    Pedro Vergara  YOB: 1960    Chief Complaint: f/u    HPI: 10/27/21: 60yoF referred for epistaxis. She reports nosebleeds are always from right nostril and occur 3-4x/week that resolve spontaneously with pressure. She also occasionally has bleeds during the night and will wake up with blood on her clothes and bedding. She takes baby asa daily. She endorses occasional nasal congestion and frequent clear rhinorrhea. C/o frequent perioral fever blister outbreaks recently.     11/18/21: 60yoF here for f/u of epistaxis. Also c/o recent rhinorrhea and headache. Still reporting nosebleeds but frequency has decreased to 2x/week. Still reporting bleeds during the night and will wake up with blood on her clothes and bedding. She also endorses occasional nasal congestion and frequent clear rhinorrhea with associated headache. Denies congestion today, dry quality to nasal cavity, rhinotillexomania, or using the heater accessibly. She is using saline gel spray as directed at the previous visit, but did not take Allegra. She takes baby asa daily and endorses bruising.     01/06/2023: 62yoF referred for dizziness. She reports this began acutely in November. It occurs multiple times daily and lasts 2 min or less. She describes this as room spinning and feeling as if she is on a merry go round. Denies prescyncope. She has to hold on to the wall to make it to the bathroom. States this occurs when she changes position such as rising as well as turning over in the bed. She has been to [facility] and received meclizine which helps but does not completely resolve symptoms. She also c/o rhinitis, nasal congestion, aural pressure, & ear popping. States "I feel like a little kid with a snotty nose all the time." Denies sneezing or itchy/watery eyes. Denies HL or tinnitus. Denies any previous otologic hx, procedures, or surgeries. Denies medication changes apart from " meclizine. She uses zyrtec daily and astelin BID with minimal effective. Denies head trauma.      02/22/2023: Reports dizziness has resolved following [place]. Continues to have bothersome rhinitis, though it has improved with zyrtec & atrovent. She feels that constantly wiping her nose is causing fever blister outbreaks under her nose. Occasional popping & discomfort to right ear. She is having carpet removed from her home today.      4/17/23: Reports rhinitis has improved, however, it is still bothersome. Blowing her nose frequently is causing her to have fever blisters under her nose. She was unsure if she could use tissues with aloe. She is using the atrovent QID. Having carpet removed also helped. C/o some continued discomfort and popping to right ear.     5/8/23: Continues to be bothered by rhinitis & intermittent sharp right otalgia. She is using flonase and atrovent BID as well as claritin and zyrtec daily. Denies hearing change. Admits to hx of TMJ and having her jaw lock up a few times, but she thought that had been better for the past several years. She is no longer wearing  and admits to eating ice frequently. Has not had any recent dizziness.     10/10/2023: States she is trying to get over bronchitis. Her ears have felt achy for the past few days. Felt increasing atrovent helped but that her nose still runs. Has MQT testing scheduled with allergist on Monday.    3/12/24: States she still has bothersome rhinitis at times. This could be exacerbated by temperature changes or allergen exposure. She has an appt with Dr. Magana tomorrow and plans to begin immunotherapy. Feels combination of astelin & flonase may work better than atrovent with flonase. Reports she also has PND at times. Has taken omeprazole daily for many years, is unsure that it makes a difference in GERD symptoms.    ROS:   10-point review of systems negative except per HPI      Review of patient's allergies indicates:   Allergen  "Reactions    Adhesive tape-silicones        Past Medical History:   Diagnosis Date    Anxiety     CKD (chronic kidney disease)     Depression     Diabetes mellitus, type 2     HLD (hyperlipidemia)     Hypertension     Obesity, unspecified     Unspecified glaucoma        Past Surgical History:   Procedure Laterality Date    CARPAL TUNNEL RELEASE      CARPAL TUNNEL RELEASE Right 07/06/2016    COLONOSCOPY  08/22/2014    HYSTERECTOMY      tendon sheath incision      TONSILLECTOMY      TUBAL LIGATION         Social History     Socioeconomic History    Marital status: Single   Tobacco Use    Smoking status: Never    Smokeless tobacco: Never    Tobacco comments:     None   Substance and Sexual Activity    Alcohol use: Not Currently     Comment: None    Drug use: Never    Sexual activity: Not Currently     Birth control/protection: None     Social Determinants of Health     Physical Activity: Inactive (8/4/2022)    Exercise Vital Sign     Days of Exercise per Week: 0 days     Minutes of Exercise per Session: 0 min       Family History   Problem Relation Age of Onset    Cancer Mother     Cancer Father         Throte caner    Hypertension Father     Diabetes Paternal Grandmother     Diabetes Maternal Grandmother        Outpatient Encounter Medications as of 10/10/2023   Medication Sig Dispense Refill    ACCU-CHEK GUIDE TEST STRIPS Strp CHECK GLUCOSE TWICE A DAY      acyclovir 5% (ZOVIRAX) 5 % Crea Apply topically 5 (five) times daily. 5 g 1    aspirin (ECOTRIN) 81 MG EC tablet Take 81 mg by mouth once daily.      atorvastatin (LIPITOR) 20 MG tablet Take 1 tablet (20 mg total) by mouth once daily. 90 tablet 2    azelastine (ASTELIN) 137 mcg (0.1 %) nasal spray USE 2 SPRAYS IN EACH NOSTRIL ONCE DAILY 30 mL 5    BD NORMA 2ND GEN PEN NEEDLE 32 gauge x 5/32" Ndle USE DAILY TO INJECT INSULIN 100 each 11    BD ULTRA-FINE SHORT PEN NEEDLE 31 gauge x 5/16" Ndle USE DAILY AS DIRECTED      blood sugar diagnostic (ACCU-CHEK GUIDE TEST " STRIPS [facility])   ACCU-CHEK GUIDE TEST STRIP, See Instructions, USE ONE STRIP TO TEST BLOOD GLUCOSE TWICE DAILY, # 50 unknown unit, 3 Refill(s), Pharmacy: [facility] STORE [id number], 158, cm, Height/Length Dosing, 21 13:08:00 CST, 85.9, kg, Weight Dosing, 21 13:08:00 CST      cyclobenzaprine (FLEXERIL) 10 MG tablet Take 1 tablet (10 mg total) by mouth nightly as needed for Muscle spasms. 15 tablet 1    diclofenac sodium (VOLTAREN) 1 % Gel APPLY 2 GRAMS TO AFFECTED AREA 3 TIMES A DAY      DULoxetine (CYMBALTA) 60 MG capsule Take 60 mg by mouth nightly.      famotidine (PEPCID) 40 MG tablet Take 1 tablet (40 mg total) by mouth once daily. 30 tablet 3    fluconazole (DIFLUCAN) 100 MG tablet Take 1 tablet (100 mg total) by mouth Every 3 (three) days. 3 tablet 1    fluticasone propionate (FLONASE) 50 mcg/actuation nasal spray 2 sprays by Each Nostril route 2 (two) times daily.      glimepiride (AMARYL) 2 MG tablet TAKE 1 TABLET BY MOUTH EVERY DAY WITH THE FIRST MEAL OF THE DAY 90 tablet 3    [] guaiFENesin-codeine 100-10 mg/5 ml (TUSSI-ORGANIDIN NR)  mg/5 mL syrup Take 5 mLs by mouth 3 (three) times daily as needed for Cough. 120 mL 0    insulin (LANTUS SOLOSTAR U-100 INSULIN) glargine 100 units/mL SubQ pen Inject 18 Units into the skin once daily. 15 each 6    ipratropium (ATROVENT) 21 mcg (0.03 %) nasal spray SPRAY 1 SPRAY IN EACH NOSTRIL 4 (FOUR) TIMES DAILY AS NEEDED FOR RUNNY NOSE 30 mL 5    JARDIANCE 25 mg tablet Take 25 mg by mouth once daily.      lisinopriL (PRINIVIL,ZESTRIL) 5 MG tablet Take 1 tablet (5 mg total) by mouth once daily. 90 tablet 2    loratadine (CLARITIN) 10 mg tablet Take 1 tablet (10 mg total) by mouth once daily. 30 tablet 11    medical supply, miscellaneous (0.2 MICRON FILTER ATTACHMENT MISC)   BD UF short Pen Needle 8MMx 31g, See Instructions, Use daily to inject insulin' Dx Code E11.9 SAMMIE , # 100 EA, 11 Refill(s), Pharmacy: [facility]/pharmacy #[id number], 157, cm, Height/Length Dosing,  "06/10/21 13:27:00 CDT, 85.4, kg, Weight Dosing, 06/10/21 1...      metoprolol succinate (TOPROL-XL) 25 MG 24 hr tablet Take 1 tablet (25 mg total) by mouth once daily. 90 tablet 2    mupirocin (BACTROBAN) 2 % ointment Apply topically 3 (three) times daily. 22 g 1    nitroGLYCERIN (NITROSTAT) 0.4 MG SL tablet Place 0.4 mg under the tongue.      ondansetron (ZOFRAN) 4 MG tablet Take 1 tablet (4 mg total) by mouth every 6 (six) hours. 12 tablet 0    pen needle, diabetic 32 gauge x 5/32" Ndle BD yanelis insulin pen needle for once a day injections 90 each 3    pregabalin (LYRICA) 75 MG capsule Take 75 mg by mouth 3 (three) times daily.      [] scopolamine (TRANSDERM-SCOP) 1.3-1.5 mg (1 mg over 3 days) Place 1 patch onto the skin every 72 hours. for 6 days 2 patch 0    tirzepatide (MOUNJARO) 5 mg/0.5 mL PnIj Inject 5 mg into the skin every 7 days. 4 pen 0    tirzepatide 10 mg/0.5 mL PnIj Inject 10 mg into the skin every 7 days. 4 pen 5    tirzepatide 7.5 mg/0.5 mL PnIj Inject 7.5 mg into the skin every 7 days. 4 pen 0    traZODone (DESYREL) 50 MG tablet Take 0.5 tablets (25 mg total) by mouth nightly as needed for Insomnia. 30 tablet 3    tretinoin (RETIN-A) 0.05 % cream Apply topically every evening. 45 g 3    [DISCONTINUED] acyclovir 5% (ZOVIRAX) 5 % ointment APPLY TO AFFECTED AREA EVERY 4 HOURS       No facility-administered encounter medications on file as of 10/10/2023.       Physical Exam:  There were no vitals filed for this visit.    General: NAD, voice normal  Neuro: AAO, CN II - XII grossly intact  Head/ Face: NCAT, symmetric, sensations intact bilaterally.   Eyes: EOMI, PERRL  Ears: externally normal with grossly normal hearing  AD: EAC patent, TM intact, no middle ear effusion, no retractions  AS: EAC patent, TM intact, no middle ear effusion, no retractions  Nose: bilateral nares patent, midline septum, clear rhinorrhea, pale/boggy mucosa, no external deformity, +ITH  OC/OP: MMM, no intraoral lesions, no " trismus, dentition is moderate, no uvular deviation, bilaterally symmetric soft palate elevation, palatoglossus and palatopharyngeal fold wnl; tonsils are symmetric and 1+  Indirect laryngoscopy: deferred due to patient intolerance  Neck: soft, supple, no LAD, normal ROM, no thyromegaly  Respiratory: nonlabored, no wheezing, bilateral chest rise  Cardiovascular: RRR  Gastrointestinal: S NT ND  Skin: warm, no lesions  Musculoskeletal: 5/5 strength  Psych: Appropriate affect/mood      Pertinent Data:  ? LABS:  ? AUDIO:             ? PATH:      Imaging:   I personally reviewed the following images:        Assessment/Plan:  62 y.o. female with right BPPV (resolved), AR, chronic rhinitis. Audio with b/l HFSNHL (stable)- reviewed. Hx of TMJ. RAST with positives to dust and juniper. She is about to begin SQ immunotherapy. D/w Dr. Bass.   - Annual audio  - NSI 1-2x/day  - Continue zyrtec Qam  - Benadryl Qpm   - Continue flonase BID  - Astelin BID  - Ok to add atrovent QID prn   - Continue conservative mgmt of TMJ  - RTC 4-6mo. Will plan to discuss clarifix if rhinitis persists     Mable Alcocer NP

## 2024-03-17 RX ORDER — EMPAGLIFLOZIN 25 MG/1
25 TABLET, FILM COATED ORAL EVERY MORNING
Qty: 30 TABLET | Refills: 11 | Status: SHIPPED | OUTPATIENT
Start: 2024-03-17

## 2024-03-22 DIAGNOSIS — Z79.4 TYPE 2 DIABETES MELLITUS WITHOUT COMPLICATION, WITH LONG-TERM CURRENT USE OF INSULIN: ICD-10-CM

## 2024-03-22 DIAGNOSIS — E11.9 TYPE 2 DIABETES MELLITUS WITHOUT COMPLICATION, WITH LONG-TERM CURRENT USE OF INSULIN: ICD-10-CM

## 2024-03-25 ENCOUNTER — TELEPHONE (OUTPATIENT)
Dept: ENDOCRINOLOGY | Facility: CLINIC | Age: 64
End: 2024-03-25
Payer: MEDICARE

## 2024-03-25 DIAGNOSIS — E11.65 TYPE 2 DIABETES MELLITUS WITH HYPERGLYCEMIA, WITH LONG-TERM CURRENT USE OF INSULIN: ICD-10-CM

## 2024-03-25 DIAGNOSIS — Z79.4 TYPE 2 DIABETES MELLITUS WITH HYPERGLYCEMIA, WITH LONG-TERM CURRENT USE OF INSULIN: ICD-10-CM

## 2024-03-25 DIAGNOSIS — E11.65 UNCONTROLLED TYPE 2 DIABETES MELLITUS WITH HYPERGLYCEMIA: Primary | ICD-10-CM

## 2024-03-25 RX ORDER — INSULIN GLARGINE 100 [IU]/ML
INJECTION, SOLUTION SUBCUTANEOUS
Qty: 15 ML | Refills: 5 | Status: SHIPPED | OUTPATIENT
Start: 2024-03-25 | End: 2024-04-10

## 2024-03-25 RX ORDER — INSULIN GLARGINE 100 [IU]/ML
18 INJECTION, SOLUTION SUBCUTANEOUS DAILY
Qty: 15 ML | Refills: 5 | Status: SHIPPED | OUTPATIENT
Start: 2024-03-25 | End: 2024-03-27 | Stop reason: CLARIF

## 2024-03-26 ENCOUNTER — TELEPHONE (OUTPATIENT)
Dept: ENDOCRINOLOGY | Facility: CLINIC | Age: 64
End: 2024-03-26
Payer: MEDICARE

## 2024-03-26 NOTE — TELEPHONE ENCOUNTER
----- Message from Dipti Stubbs sent at 3/25/2024  3:04 PM CDT -----  Regarding: Herbie JIMENEZ PT        Pt is requesting a call back regarding her insulin. The pharmacy ordered the medication but they are unsure of the delivery date.   Pt is requesting a call back @ [phone]

## 2024-03-26 NOTE — TELEPHONE ENCOUNTER
Prabhu Nuñez [facility] Endocrinology Clinical Support Staff  Caller: Unspecified (Today,  8:08 AM)  Mary Pt        Pt stated that she needed to speak with [facility] nurse about  her insulin pens. She also stated that the pharmacy was out of the Lantus , pt stated keep the refill of the generic of the insulin due to them being out she has enough to last her to may .    insulin (SEMGLEE PEN U-100 INSULIN) glargine 100  8:15  03/26/2024    Pt #  [phone]

## 2024-03-27 NOTE — TELEPHONE ENCOUNTER
Return call received from pt stating semglee is also on back order. She stated her brother who was previously on lantus has given her enough to last until may 2024. I instructed pt she should not take medication from anyone, but she stated it is from her brother    I also received a letter from insurance saying semglee is not on formulary so she will keep using lantus given to her by her brother

## 2024-04-04 ENCOUNTER — LAB VISIT (OUTPATIENT)
Dept: LAB | Facility: HOSPITAL | Age: 64
End: 2024-04-04
Attending: INTERNAL MEDICINE
Payer: MEDICARE

## 2024-04-04 DIAGNOSIS — E03.9 MYXEDEMA HEART DISEASE: Primary | ICD-10-CM

## 2024-04-04 DIAGNOSIS — E78.00 PURE HYPERCHOLESTEROLEMIA: ICD-10-CM

## 2024-04-04 DIAGNOSIS — E55.9 AVITAMINOSIS D: ICD-10-CM

## 2024-04-04 DIAGNOSIS — I51.9 MYXEDEMA HEART DISEASE: Primary | ICD-10-CM

## 2024-04-04 DIAGNOSIS — E11.9 DIABETES MELLITUS WITHOUT COMPLICATION: ICD-10-CM

## 2024-04-04 DIAGNOSIS — I10 HYPERTENSION, UNSPECIFIED TYPE: ICD-10-CM

## 2024-04-04 LAB
ALBUMIN SERPL-MCNC: 4.7 G/DL (ref 3.4–5)
ALBUMIN/GLOB SERPL: 1.4 RATIO
ALP SERPL-CCNC: 73 UNIT/L (ref 50–144)
ALT SERPL-CCNC: 20 UNIT/L (ref 1–45)
ANION GAP SERPL CALC-SCNC: 7 MEQ/L (ref 2–13)
AST SERPL-CCNC: 30 UNIT/L (ref 14–36)
BASOPHILS # BLD AUTO: 0.08 X10(3)/MCL (ref 0.01–0.08)
BASOPHILS NFR BLD AUTO: 1 % (ref 0.1–1.2)
BILIRUB SERPL-MCNC: 0.8 MG/DL (ref 0–1)
BUN SERPL-MCNC: 14 MG/DL (ref 7–20)
CALCIUM SERPL-MCNC: 9.7 MG/DL (ref 8.4–10.2)
CHLORIDE SERPL-SCNC: 106 MMOL/L (ref 98–110)
CHOLEST SERPL-MCNC: 164 MG/DL (ref 0–200)
CO2 SERPL-SCNC: 28 MMOL/L (ref 21–32)
CREAT SERPL-MCNC: 0.94 MG/DL (ref 0.66–1.25)
CREAT/UREA NIT SERPL: 15 (ref 12–20)
DEPRECATED CALCIDIOL+CALCIFEROL SERPL-MC: 42.9 NG/ML (ref 30–80)
EOSINOPHIL # BLD AUTO: 0.29 X10(3)/MCL (ref 0.04–0.36)
EOSINOPHIL NFR BLD AUTO: 3.8 % (ref 0.7–7)
ERYTHROCYTE [DISTWIDTH] IN BLOOD BY AUTOMATED COUNT: 13.2 % (ref 11–14.5)
EST. AVERAGE GLUCOSE BLD GHB EST-MCNC: 137 MG/DL (ref 70–115)
GFR SERPLBLD CREATININE-BSD FMLA CKD-EPI: 67 MLS/MIN/1.73/M2
GLOBULIN SER-MCNC: 3.3 GM/DL (ref 2–3.9)
GLUCOSE SERPL-MCNC: 110 MG/DL (ref 70–115)
HBA1C MFR BLD: 6.4 % (ref 4–6)
HCT VFR BLD AUTO: 41.2 % (ref 36–48)
HDLC SERPL-MCNC: 60 MG/DL (ref 40–60)
HGB BLD-MCNC: 13.4 G/DL (ref 11.8–16)
IMM GRANULOCYTES # BLD AUTO: 0.02 X10(3)/MCL (ref 0–0.03)
IMM GRANULOCYTES NFR BLD AUTO: 0.3 % (ref 0–0.5)
LDLC SERPL DIRECT ASSAY-SCNC: 73.4 MG/DL (ref 30–100)
LYMPHOCYTES # BLD AUTO: 2.83 X10(3)/MCL (ref 1.16–3.74)
LYMPHOCYTES NFR BLD AUTO: 37.1 % (ref 20–55)
MCH RBC QN AUTO: 28.6 PG (ref 27–34)
MCHC RBC AUTO-ENTMCNC: 32.5 G/DL (ref 31–37)
MCV RBC AUTO: 88 FL (ref 79–99)
MONOCYTES # BLD AUTO: 0.67 X10(3)/MCL (ref 0.24–0.36)
MONOCYTES NFR BLD AUTO: 8.8 % (ref 4.7–12.5)
NEUTROPHILS # BLD AUTO: 3.74 X10(3)/MCL (ref 1.56–6.13)
NEUTROPHILS NFR BLD AUTO: 49 % (ref 37–73)
NRBC BLD AUTO-RTO: 0 %
PLATELET # BLD AUTO: 311 X10(3)/MCL (ref 140–371)
PMV BLD AUTO: 10.8 FL (ref 9.4–12.4)
POTASSIUM SERPL-SCNC: 3.9 MMOL/L (ref 3.5–5.1)
PROT SERPL-MCNC: 8 GM/DL (ref 6.3–8.2)
RBC # BLD AUTO: 4.68 X10(6)/MCL (ref 4–5.1)
SODIUM SERPL-SCNC: 141 MMOL/L (ref 135–145)
TRIGL SERPL-MCNC: 133 MG/DL (ref 30–200)
TSH SERPL-ACNC: 1.65 UIU/ML (ref 0.36–3.74)
WBC # SPEC AUTO: 7.63 X10(3)/MCL (ref 4–11.5)

## 2024-04-04 PROCEDURE — 36415 COLL VENOUS BLD VENIPUNCTURE: CPT

## 2024-04-04 PROCEDURE — 80061 LIPID PANEL: CPT

## 2024-04-04 PROCEDURE — 85025 COMPLETE CBC W/AUTO DIFF WBC: CPT

## 2024-04-04 PROCEDURE — 80053 COMPREHEN METABOLIC PANEL: CPT

## 2024-04-04 PROCEDURE — 82306 VITAMIN D 25 HYDROXY: CPT

## 2024-04-04 PROCEDURE — 84443 ASSAY THYROID STIM HORMONE: CPT

## 2024-04-04 PROCEDURE — 83036 HEMOGLOBIN GLYCOSYLATED A1C: CPT

## 2024-04-10 DIAGNOSIS — E11.9 TYPE 2 DIABETES MELLITUS WITHOUT COMPLICATION, WITH LONG-TERM CURRENT USE OF INSULIN: ICD-10-CM

## 2024-04-10 DIAGNOSIS — Z79.4 TYPE 2 DIABETES MELLITUS WITHOUT COMPLICATION, WITH LONG-TERM CURRENT USE OF INSULIN: ICD-10-CM

## 2024-04-10 RX ORDER — INSULIN GLARGINE 100 [IU]/ML
18 INJECTION, SOLUTION SUBCUTANEOUS DAILY
Qty: 30 ML | Refills: 3 | Status: SHIPPED | OUTPATIENT
Start: 2024-04-10

## 2024-09-03 DIAGNOSIS — N18.9 CHRONIC KIDNEY DISEASE, UNSPECIFIED CKD STAGE: Primary | ICD-10-CM

## 2024-09-09 ENCOUNTER — DOCUMENTATION ONLY (OUTPATIENT)
Dept: NEPHROLOGY | Facility: CLINIC | Age: 64
End: 2024-09-09
Payer: MEDICARE

## 2024-10-08 ENCOUNTER — LAB VISIT (OUTPATIENT)
Dept: LAB | Facility: HOSPITAL | Age: 64
End: 2024-10-08
Attending: INTERNAL MEDICINE
Payer: MEDICARE

## 2024-10-08 DIAGNOSIS — E03.9 MYXEDEMA HEART DISEASE: Primary | ICD-10-CM

## 2024-10-08 DIAGNOSIS — E78.00 PURE HYPERCHOLESTEROLEMIA: ICD-10-CM

## 2024-10-08 DIAGNOSIS — I10 HYPERTENSION, UNSPECIFIED TYPE: ICD-10-CM

## 2024-10-08 DIAGNOSIS — I51.9 MYXEDEMA HEART DISEASE: Primary | ICD-10-CM

## 2024-10-08 DIAGNOSIS — E11.9 DIABETES MELLITUS WITHOUT COMPLICATION: ICD-10-CM

## 2024-10-08 DIAGNOSIS — E55.9 AVITAMINOSIS D: ICD-10-CM

## 2024-10-08 DIAGNOSIS — R80.9 PROTEINURIA, UNSPECIFIED TYPE: ICD-10-CM

## 2024-10-08 LAB
25(OH)D3+25(OH)D2 SERPL-MCNC: 42 NG/ML (ref 30–80)
ALBUMIN SERPL-MCNC: 4.4 G/DL (ref 3.4–5)
ALBUMIN/GLOB SERPL: 1.4 RATIO
ALP SERPL-CCNC: 70 UNIT/L (ref 50–144)
ALT SERPL-CCNC: 9 UNIT/L (ref 1–45)
ANION GAP SERPL CALC-SCNC: 9 MEQ/L (ref 2–13)
AST SERPL-CCNC: 22 UNIT/L (ref 14–36)
BASOPHILS # BLD AUTO: 0.05 X10(3)/MCL (ref 0.01–0.08)
BASOPHILS NFR BLD AUTO: 0.6 % (ref 0.1–1.2)
BILIRUB SERPL-MCNC: 0.5 MG/DL (ref 0–1)
BUN SERPL-MCNC: 15 MG/DL (ref 7–20)
CALCIUM SERPL-MCNC: 9.8 MG/DL (ref 8.4–10.2)
CHLORIDE SERPL-SCNC: 107 MMOL/L (ref 98–110)
CHOLEST SERPL-MCNC: 162 MG/DL (ref 0–200)
CO2 SERPL-SCNC: 24 MMOL/L (ref 21–32)
CREAT SERPL-MCNC: 1.04 MG/DL (ref 0.66–1.25)
CREAT UR-MCNC: 141.7 MG/DL (ref 45–106)
CREAT/UREA NIT SERPL: 14 (ref 12–20)
EOSINOPHIL # BLD AUTO: 0.08 X10(3)/MCL (ref 0.04–0.36)
EOSINOPHIL NFR BLD AUTO: 0.9 % (ref 0.7–7)
ERYTHROCYTE [DISTWIDTH] IN BLOOD BY AUTOMATED COUNT: 12.4 % (ref 11–14.5)
EST. AVERAGE GLUCOSE BLD GHB EST-MCNC: 125.5 MG/DL (ref 70–115)
GFR SERPLBLD CREATININE-BSD FMLA CKD-EPI: 60 ML/MIN/1.73/M2
GLOBULIN SER-MCNC: 3.1 GM/DL (ref 2–3.9)
GLUCOSE SERPL-MCNC: 72 MG/DL (ref 70–115)
HBA1C MFR BLD: 6 % (ref 4–6)
HCT VFR BLD AUTO: 40.3 % (ref 36–48)
HDLC SERPL-MCNC: 65 MG/DL (ref 40–60)
HGB BLD-MCNC: 13.1 G/DL (ref 11.8–16)
IMM GRANULOCYTES # BLD AUTO: 0.02 X10(3)/MCL (ref 0–0.03)
IMM GRANULOCYTES NFR BLD AUTO: 0.2 % (ref 0–0.5)
LDLC SERPL DIRECT ASSAY-SCNC: 83.9 MG/DL (ref 30–100)
LYMPHOCYTES # BLD AUTO: 2.75 X10(3)/MCL (ref 1.16–3.74)
LYMPHOCYTES NFR BLD AUTO: 31.6 % (ref 20–55)
MCH RBC QN AUTO: 28.5 PG (ref 27–34)
MCHC RBC AUTO-ENTMCNC: 32.5 G/DL (ref 31–37)
MCV RBC AUTO: 87.6 FL (ref 79–99)
MICROALBUMIN UR-MCNC: 7.7 UG/ML
MICROALBUMIN/CREAT RATIO PNL UR: 5.4 MG/GM CR (ref 0–30)
MONOCYTES # BLD AUTO: 0.57 X10(3)/MCL (ref 0.24–0.36)
MONOCYTES NFR BLD AUTO: 6.5 % (ref 4.7–12.5)
NEUTROPHILS # BLD AUTO: 5.24 X10(3)/MCL (ref 1.56–6.13)
NEUTROPHILS NFR BLD AUTO: 60.2 % (ref 37–73)
NRBC BLD AUTO-RTO: 0 %
PLATELET # BLD AUTO: 276 X10(3)/MCL (ref 140–371)
PMV BLD AUTO: 10.6 FL (ref 9.4–12.4)
POTASSIUM SERPL-SCNC: 3.8 MMOL/L (ref 3.5–5.1)
PROT SERPL-MCNC: 7.5 GM/DL (ref 6.3–8.2)
RBC # BLD AUTO: 4.6 X10(6)/MCL (ref 4–5.1)
SODIUM SERPL-SCNC: 140 MMOL/L (ref 136–145)
TRIGL SERPL-MCNC: 83 MG/DL (ref 30–200)
TSH SERPL-ACNC: 0.65 UIU/ML (ref 0.36–3.74)
WBC # BLD AUTO: 8.71 X10(3)/MCL (ref 4–11.5)

## 2024-10-08 PROCEDURE — 82570 ASSAY OF URINE CREATININE: CPT

## 2024-10-08 PROCEDURE — 82043 UR ALBUMIN QUANTITATIVE: CPT

## 2024-10-08 PROCEDURE — 80061 LIPID PANEL: CPT

## 2024-10-08 PROCEDURE — 82306 VITAMIN D 25 HYDROXY: CPT

## 2024-10-08 PROCEDURE — 36415 COLL VENOUS BLD VENIPUNCTURE: CPT

## 2024-10-08 PROCEDURE — 83036 HEMOGLOBIN GLYCOSYLATED A1C: CPT

## 2024-10-08 PROCEDURE — 84443 ASSAY THYROID STIM HORMONE: CPT

## 2024-10-08 PROCEDURE — 85025 COMPLETE CBC W/AUTO DIFF WBC: CPT

## 2024-10-08 PROCEDURE — 80053 COMPREHEN METABOLIC PANEL: CPT

## 2024-10-14 ENCOUNTER — TELEPHONE (OUTPATIENT)
Dept: INTERNAL MEDICINE | Facility: CLINIC | Age: 64
End: 2024-10-14

## 2024-11-11 DIAGNOSIS — E11.65 TYPE 2 DIABETES MELLITUS WITH HYPERGLYCEMIA: ICD-10-CM

## 2024-11-11 RX ORDER — GLIMEPIRIDE 2 MG/1
TABLET ORAL
Qty: 90 TABLET | Refills: 0 | Status: SHIPPED | OUTPATIENT
Start: 2024-11-11

## 2024-11-18 ENCOUNTER — OFFICE VISIT (OUTPATIENT)
Dept: ENDOCRINOLOGY | Facility: CLINIC | Age: 64
End: 2024-11-18
Payer: MEDICARE

## 2024-11-18 VITALS
HEART RATE: 54 BPM | RESPIRATION RATE: 12 BRPM | WEIGHT: 143.94 LBS | DIASTOLIC BLOOD PRESSURE: 68 MMHG | HEIGHT: 62 IN | BODY MASS INDEX: 26.49 KG/M2 | SYSTOLIC BLOOD PRESSURE: 114 MMHG | TEMPERATURE: 98 F

## 2024-11-18 DIAGNOSIS — E11.65 TYPE 2 DIABETES MELLITUS WITH HYPERGLYCEMIA: ICD-10-CM

## 2024-11-18 DIAGNOSIS — G62.9 NEUROPATHY: ICD-10-CM

## 2024-11-18 DIAGNOSIS — Z79.4 TYPE 2 DIABETES MELLITUS WITH HYPERGLYCEMIA, WITH LONG-TERM CURRENT USE OF INSULIN: Primary | ICD-10-CM

## 2024-11-18 DIAGNOSIS — E11.65 TYPE 2 DIABETES MELLITUS WITH HYPERGLYCEMIA, WITH LONG-TERM CURRENT USE OF INSULIN: Primary | ICD-10-CM

## 2024-11-18 DIAGNOSIS — N28.9 NEPHROPATHY: ICD-10-CM

## 2024-11-18 PROCEDURE — 99214 OFFICE O/P EST MOD 30 MIN: CPT | Mod: S$PBB,,, | Performed by: NURSE PRACTITIONER

## 2024-11-18 PROCEDURE — 99215 OFFICE O/P EST HI 40 MIN: CPT | Mod: PBBFAC | Performed by: NURSE PRACTITIONER

## 2024-11-18 RX ORDER — EZETIMIBE 10 MG/1
10 TABLET ORAL DAILY
COMMUNITY
Start: 2024-10-14

## 2024-11-18 RX ORDER — TIRZEPATIDE 15 MG/.5ML
15 INJECTION, SOLUTION SUBCUTANEOUS
COMMUNITY
Start: 2024-10-01

## 2024-11-18 RX ORDER — OMEPRAZOLE 40 MG/1
40 CAPSULE, DELAYED RELEASE ORAL
COMMUNITY
Start: 2024-10-26

## 2024-11-18 RX ORDER — GLIMEPIRIDE 2 MG/1
TABLET ORAL
Qty: 90 TABLET | Refills: 3 | Status: SHIPPED | OUTPATIENT
Start: 2024-11-18

## 2024-11-18 RX ORDER — EPINEPHRINE 0.3 MG/.3ML
INJECTION SUBCUTANEOUS
COMMUNITY
Start: 2024-07-25

## 2024-11-18 RX ORDER — ZOLPIDEM TARTRATE 10 MG/1
10 TABLET ORAL NIGHTLY
COMMUNITY
Start: 2024-10-14

## 2024-11-18 NOTE — PROGRESS NOTES
Subjective     Patient ID: Pedro Vergara is a 63 y.o. female.    Chief Complaint: Follow-up type 2 diabetes      03/05/2021 Endocrine Clinic Note: Ms. Pedro Vergara is a 60 yo F with PMHx of T2DM, vitamin D deficiency, CKD stage III, and hypertension. She presents today for follow-up regarding diabetes management. She is currently taking Victoza 1.8, glimepiride 4, Lantus 25U, and Jardiance 10 in the am. She states that she checks blood glucose at once in the morning and once at night and endorses that she often has hypoglycemia in the mornings reaching blood glucoses in the 60s. Other times she states that her blood glucose is higher reaching around the 140s-150s. She is compliant with her diabetes medication regimen and expresses that her diet is much improved from her previous visit, eating more vegetables and healthy meats. She admits that she does not always eat breakfast, which might be contributing to her hypoglycemic episodes. Today, she says she is feeling fatigued, which she attributes to her recent COVID vaccine and hypoglycemia. (1) -Last A1C on 3/3/21 7.5% (down 8.8% in 12/2020), trending in proper direction, A1C goal <7%  -Continue Lantus 25U, Victoza 1.8 mg  -Decrease glimepiride to 2mg at breakfast with none at night given hypoglycemic episodes; if pt does not eat breakfast, instructed to take glimepiride with largest meal  -Increase Jardiance to 25 mg in am given still having hyperglycemic episodes  -Maintain fasting glucose between  and post-prandial glucose <180  -Counseled patient on importance of bringing glucose log to next visit, patient agreeable  -Encouraged patient to continue with healthy diet and to ensure she eats to prevent hypoglycemic episodes (2)     06/10/2021 Endocrine Clinic Note: 60-year-old female scheduled today as endocrine clinic follow-up.  History of uncontrolled type 2 diabetes, CKD stage III, hypertension, hyperlipidemia, vitamin D deficiency.  Patient was  previously referred to endocrine clinic is here today for follow-up visit.  Current A1c 7.9 increased from Previous A1c 7.5,  previous 8.8 and 11.0.  Patient has only checks CBG's about once a week range of 110-157.  Patient denies any symptoms of hypoglycemia.  On patient's previous visit glimepiride was changed from twice a day to once in the morning due to a.m. hypoglycemia which has resolved.  Patient was started on Victoza and was to titrate up as stated currently she is on 1.2 mg.  Hypertension at goal today.  CKD/nephropathy Patient is seen by renal clinic previous renal functions GFR 55, creatinine 1.26, BUN 16 improved from previous renal functions on 8/11/2020 GFR 42, creatinine 42, BUN 1.6.  Neuropathy patient was put on gabapentin and sees a podiatrist but was recently referred to neurologist due to sciatica. (1)     10/11/2021 Endocrine Clinic Note: 60-year-old female scheduled today for endocrine clinic follow-up.  History of uncontrolled type 2 diabetes, CKD stage III, hypertension, hyperlipidemia and vitamin D deficiency.  Uncontrolled type 2 diabetes current A1c 7.2 improved from previous 7.9, 7.5, 8.8 and 11.0. Patient checks CBGs daily fasting ranges .  Patient has frequent hypoglycemia in the a.m.  When discussing with patient patient needs at 6 PM and at times does not eat until the next day at noon.  Discussed with patient today eating breakfast in the morning.  Also DM education will discussed with patient today eating 3 meals per day. Neuropathy patient is currently on gabapentin.  Nephropathy urine micro elevated 10/27/2020 on lisinopril 5 mg repeat urine micro today.  Hypertension at goal. CKD patient is followed by renal clinic.  Patient has fever blisters breaking out around her mouth previously renal clinic is giving her Valtrex 1 g x 2 doses longer dosing not recommended due to renal functions.  Nephropathy urine micro mild elevation 12/30/2021 patient is currently on low-dose  ACE.  Neuropathy discussed with patient controlling diabetes.     Endocrine clinic note 08/04/2022: 61-year-old female scheduled today for endocrine clinic follow-up.  History of uncontrolled type 2 diabetes with hypoglycemia, CKD stage 3, hypertension, hyperlipidemia vitamin-D deficiency.  Uncontrolled type 2 diabetes current A1c 7.5 previous 7.2 and 7.9.  Patient checks CBG is 1-2 times per day CBG's range fasting  in the am.  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  Will need patient's CGM alert patient hypoglycemia.  Patient reports current A1c slightly increased due to her being on a cruise recently and states she being large meals and lots of meals overnight on ADA diet.  She returned from the cruise a few days ago.     Endocrine clinic note 12/20/2022: 61 Female scheduled today as endocrine clinic follow-up.  History of type 2 diabetes, CKD stage 3 with nephropathy/neuropathy, hypertension, hyperlipidemia vitamin-D deficiency.  Type 2 diabetes current A1c 8.4 previous A1C 7.5, 7.6 and 7.2.  On patient's previous visit patient was having hypoglycemia on basal insulin was decreased from 22 units to 18 units.  Patient continued to have hypoglycemia Lantus was decreased to 12 units.  Patient had increasing A1c in the last month and a half patient has had severe vertigo and has not been active in his closely staying in bed due to severe vertigo.  Patient is in walk-in clinic and states she was giving doses of meclizine which helped but she ran out.  Patient also reports popping to bilateral ears patient has a prescription and Zyrtec but has not been taking.  Instructed patient to start Zyrtec daily use meclizine as needed and will refer her to ENT clinic.  Patient checks CBGS  3-4 times per week ranges 76 to 170. Nephropathy patient is currently on an Ace urine micro on 08/04/2022 normal.  Patient has CKD stage 3 and is currently seen in renal Clinic encouraged patient to keep all appointments.  Patient has had recent improvement in kidney function.  Neuropathy foot exam done 08/14/2022.      Endocrine clinic note 06/01/2023:  62-year-old female scheduled today for endocrine clinic follow-up.  History of uncontrolled type 2 diabetes with CKD stage 3 with nephropathy, neuropathy, hypertension, hyperlipidemia vitamin-D deficiency.  Type 2 diabetes current A1c increased to 10.9 from previous 8.4 and 7.5.  Patient states she initially had appetite control on Trulicity was eating better but no longer his appetite control and has started eating more foods outside of ADA diet.  She states compliance with medication.  She denies any hypoglycemia.  Previously Trulicity working uncontrolled appetite and also patient's diabetes.  Patient also reports darkness in the her eyes with small pimples states previously she was on based cream that helped improved patient had a picture tretinoin cream.      Endocrine clinic note 11/02/2023:  62 year female scheduled today for endocrine clinic follow-up.  History of uncontrolled type 2 diabetes CKD stage 3 with nephropathy, neuropathy, hypertension, hyperlipidemia and vitamin-D deficiency.  Type 2 diabetes current A1C 7.0 Previous A1c 10.9, 8.4 and 7.5.  Previously patient had lost appetite control on Trulicity and was eating more carbs starches non ADA diet and A1c increased to 10.9.  Patient was changed to Mounjaro has increase to Mounjaro 10 mg. Pt has improved A1C and 15 pound weight loss.  Patient denies any side effects to her medications and states compliance.  Foot exam performed today see documentation.  Neuropathy patient states improvement with controlled diabetes and also patient has improved kidney function with controlled diabetes.     Endocrine  clinic note 11/18/2024:  63 year female scheduled today for endocrine clinic follow-up.  History of type 2 diabetes with CKD and nephropathy and Neuropathy.  Type 2 diabetes current A1c 6.0.  Patient denies any hypoglycemia on reactions to her medications.  Neuropathy foot exam performed today see documentation.  Patient denies any neuropathy she states she does serve sciatica and has had multiple lower back injections.  Eye exam patient was seen in a new ophthalmologist's on Monday for her eye exam.  Nephropathy patient is currently lisinopril 10 mg recent urine micro mild elevation.                  Review of Systems   Constitutional:  Negative for activity change, appetite change and fatigue.   HENT:  Negative for dental problem, hearing loss, tinnitus, trouble swallowing and goiter.    Eyes:  Negative for photophobia, pain and visual disturbance.   Respiratory:  Negative for cough, chest tightness and wheezing.    Cardiovascular:  Negative for chest pain, palpitations and leg swelling.   Gastrointestinal:  Negative for abdominal pain, constipation, diarrhea, nausea and reflux.   Endocrine: Negative for cold intolerance, heat intolerance, polydipsia and polyphagia.   Genitourinary:  Negative for difficulty urinating, flank pain, hematuria, hot flashes, menstrual irregularity, menstrual problem, nocturia and urgency.   Musculoskeletal:  Negative for back pain, gait problem, joint swelling, leg pain and joint deformity.   Integumentary:  Negative for color change, pallor, rash and breast discharge.   Allergic/Immunologic: Negative for environmental allergies, food allergies and immunocompromised state.   Neurological:  Negative for tremors, seizures, headaches, memory loss and coordination difficulties.   Psychiatric/Behavioral:  Negative for agitation, behavioral problems and sleep disturbance. The patient is not nervous/anxious.           Objective     Physical Exam  Constitutional:       General: She is not in  acute distress.     Appearance: Normal appearance. She is not ill-appearing.   HENT:      Head: Normocephalic and atraumatic.      Right Ear: External ear normal.      Left Ear: External ear normal.      Nose: Nose normal. No congestion or rhinorrhea.      Mouth/Throat:      Mouth: Mucous membranes are moist.      Pharynx: Oropharynx is clear. No oropharyngeal exudate.   Eyes:      General:         Right eye: No discharge.         Left eye: No discharge.      Conjunctiva/sclera: Conjunctivae normal.      Pupils: Pupils are equal, round, and reactive to light.   Neck:      Thyroid: No thyroid mass, thyromegaly or thyroid tenderness.   Cardiovascular:      Rate and Rhythm: Normal rate and regular rhythm.      Pulses: Normal pulses.           Dorsalis pedis pulses are 2+ on the right side and 2+ on the left side.        Posterior tibial pulses are 2+ on the right side and 2+ on the left side.      Heart sounds: Normal heart sounds. No murmur heard.  Pulmonary:      Effort: Pulmonary effort is normal. No respiratory distress.      Breath sounds: Normal breath sounds.   Abdominal:      General: Abdomen is flat. Bowel sounds are normal. There is no distension.      Palpations: Abdomen is soft.      Tenderness: There is no abdominal tenderness.   Musculoskeletal:         General: No swelling or tenderness. Normal range of motion.      Cervical back: Normal range of motion and neck supple. No tenderness.      Right lower leg: No edema.      Left lower leg: No edema.   Feet:      Right foot:      Protective Sensation: 5 sites tested.  5 sites sensed.      Skin integrity: Skin integrity normal.      Toenail Condition: Right toenails are normal.      Left foot:      Protective Sensation: 5 sites tested.  5 sites sensed.      Skin integrity: Skin integrity normal.      Toenail Condition: Left toenails are normal.   Lymphadenopathy:      Cervical: No cervical adenopathy.   Skin:     General: Skin is warm and dry.       Coloration: Skin is not jaundiced or pale.   Neurological:      General: No focal deficit present.      Mental Status: She is alert and oriented to person, place, and time. Mental status is at baseline.      Coordination: Coordination normal.      Gait: Gait normal.   Psychiatric:         Mood and Affect: Mood normal.         Behavior: Behavior normal.         Thought Content: Thought content normal.            Assessment and Plan     1. Type 2 diabetes mellitus with hyperglycemia, with long-term current use of insulin  Current A1C 6.0   Medications: Glimepiride 2 mg Qday, Jardiance 25mg Qday, Mounjaro 15 mg q week  Lantus 18 units  Changes: none   Diabetic Eye Exam: Appt Monday with opthalmology   Diabetic Foot Exam:  11/18/2024  -     glimepiride (AMARYL) 2 MG tablet; TAKE 1 TABLET BY MOUTH ONCE DAILY WITH  THE  FIRST  MEAL  OF  THE  DAY  Dispense: 90 tablet; Refill: 3       2. Neuropathy  Foot exam today 11/18/2024    3. Nephropathy  On lisinopril 10 mg  Component Ref Range & Units 1 mo ago  (10/8/24) 1 yr ago  (10/9/23) 1 yr ago  (8/29/23) 1 yr ago  (6/5/23) 1 yr ago  (12/30/22) 2 yr ago  (8/4/22) 2 yr ago  (2/11/22)   Urine Microalbumin <=30.0 ug/mL 7.7   <5.0 R 5.6 R, CM <5.0 R <5.0 R   Urine Creatinine 45.0 - 106.0 mg/dL 141.7 High  104.1 R 110.7 R 91.8 R 34.8 Low  R, CM 82.9 R 107.9 R   Microalbumin Creatinine Ratio 0.0 - 30.0 mg/gm Cr 5.4   <5.4 16.1 <6.0 <           Follow up in about 6 months (around 5/18/2025) for Type 2 diabetes.

## 2024-11-20 ENCOUNTER — TELEPHONE (OUTPATIENT)
Dept: ENDOCRINOLOGY | Facility: CLINIC | Age: 64
End: 2024-11-20
Payer: MEDICARE

## 2024-11-20 NOTE — TELEPHONE ENCOUNTER
----- Message from Augie sent at 11/20/2024 10:00 AM CST -----  Patient of Mary    Patient called requesting little needles as the long one hurts.  It can be called in at [facility].    [phone]  10:01  Thanks,

## 2024-11-25 DIAGNOSIS — Z79.4 TYPE 2 DIABETES MELLITUS WITHOUT COMPLICATION, WITH LONG-TERM CURRENT USE OF INSULIN: ICD-10-CM

## 2024-11-25 DIAGNOSIS — E11.9 TYPE 2 DIABETES MELLITUS WITHOUT COMPLICATION, WITH LONG-TERM CURRENT USE OF INSULIN: ICD-10-CM

## 2024-11-25 RX ORDER — PEN NEEDLE, DIABETIC 30 GX3/16"
NEEDLE, DISPOSABLE MISCELLANEOUS
Qty: 90 EACH | Refills: 3 | Status: SHIPPED | OUTPATIENT
Start: 2024-11-25 | End: 2024-11-26

## 2024-11-25 NOTE — TELEPHONE ENCOUNTER
----- Message from Bailey sent at 11/25/2024  7:25 AM CST -----  Patient of Mary    Patient requesting refill on needles for insulin      Please send to [id number] [place] [facility] [address] [facility] [place] [address]  Phone: [phone] Fax: [phone]  Hours: Not open 24 hours    [id number]    [phone]

## 2024-11-26 DIAGNOSIS — Z79.4 TYPE 2 DIABETES MELLITUS WITHOUT COMPLICATION, WITH LONG-TERM CURRENT USE OF INSULIN: ICD-10-CM

## 2024-11-26 DIAGNOSIS — E11.9 TYPE 2 DIABETES MELLITUS WITHOUT COMPLICATION, WITH LONG-TERM CURRENT USE OF INSULIN: ICD-10-CM

## 2024-11-26 RX ORDER — PEN NEEDLE, DIABETIC 32GX 5/32"
NEEDLE, DISPOSABLE MISCELLANEOUS
Qty: 100 EACH | Refills: 3 | Status: SHIPPED | OUTPATIENT
Start: 2024-11-26

## 2024-11-27 ENCOUNTER — PATIENT OUTREACH (OUTPATIENT)
Dept: ENDOCRINOLOGY | Facility: CLINIC | Age: 64
End: 2024-11-27
Payer: MEDICARE

## 2024-11-27 LAB
LEFT EYE DM RETINOPATHY: NORMAL
RIGHT EYE DM RETINOPATHY: NORMAL

## 2024-12-02 RX ORDER — PEN NEEDLE, DIABETIC 30 GX3/16"
NEEDLE, DISPOSABLE MISCELLANEOUS
Refills: 0 | OUTPATIENT
Start: 2024-12-02

## 2024-12-06 DIAGNOSIS — E11.9 TYPE 2 DIABETES MELLITUS WITHOUT COMPLICATION, WITH LONG-TERM CURRENT USE OF INSULIN: Primary | ICD-10-CM

## 2024-12-06 DIAGNOSIS — Z79.4 TYPE 2 DIABETES MELLITUS WITHOUT COMPLICATION, WITH LONG-TERM CURRENT USE OF INSULIN: Primary | ICD-10-CM

## 2024-12-06 RX ORDER — PEN NEEDLE, DIABETIC 30 GX3/16"
NEEDLE, DISPOSABLE MISCELLANEOUS
Qty: 90 EACH | Refills: 3 | Status: SHIPPED | OUTPATIENT
Start: 2024-12-06

## 2024-12-19 NOTE — PROGRESS NOTES
"[facility]  Otolaryngology Clinic Note    Pedro Vergara  YOB: 1960    Chief Complaint: f/u    HPI: 10/27/21: 60yoF referred for epistaxis. She reports nosebleeds are always from right nostril and occur 3-4x/week that resolve spontaneously with pressure. She also occasionally has bleeds during the night and will wake up with blood on her clothes and bedding. She takes baby asa daily. She endorses occasional nasal congestion and frequent clear rhinorrhea. C/o frequent perioral fever blister outbreaks recently.     11/18/21: 60yoF here for f/u of epistaxis. Also c/o recent rhinorrhea and headache. Still reporting nosebleeds but frequency has decreased to 2x/week. Still reporting bleeds during the night and will wake up with blood on her clothes and bedding. She also endorses occasional nasal congestion and frequent clear rhinorrhea with associated headache. Denies congestion today, dry quality to nasal cavity, rhinotillexomania, or using the heater accessibly. She is using saline gel spray as directed at the previous visit, but did not take Allegra. She takes baby asa daily and endorses bruising.     01/06/2023: 62yoF referred for dizziness. She reports this began acutely in November. It occurs multiple times daily and lasts 2 min or less. She describes this as room spinning and feeling as if she is on a merry go round. Denies prescyncope. She has to hold on to the wall to make it to the bathroom. States this occurs when she changes position such as rising as well as turning over in the bed. She has been to [facility] and received meclizine which helps but does not completely resolve symptoms. She also c/o rhinitis, nasal congestion, aural pressure, & ear popping. States "I feel like a little kid with a snotty nose all the time." Denies sneezing or itchy/watery eyes. Denies HL or tinnitus. Denies any previous otologic hx, procedures, or surgeries. Denies medication changes apart from " meclizine. She uses zyrtec daily and astelin BID with minimal effective. Denies head trauma.      02/22/2023: Reports dizziness has resolved following Epley. Continues to have bothersome rhinitis, though it has improved with zyrtec & atrovent. She feels that constantly wiping her nose is causing fever blister outbreaks under her nose. Occasional popping & discomfort to right ear. She is having carpet removed from her home today.      4/17/23: Reports rhinitis has improved, however, it is still bothersome. Blowing her nose frequently is causing her to have fever blisters under her nose. She was unsure if she could use tissues with aloe. She is using the atrovent QID. Having carpet removed also helped. C/o some continued discomfort and popping to right ear.     5/8/23: Continues to be bothered by rhinitis & intermittent sharp right otalgia. She is using flonase and atrovent BID as well as claritin and zyrtec daily. Denies hearing change. Admits to hx of TMJ and having her jaw lock up a few times, but she thought that had been better for the past several years. She is no longer wearing  and admits to eating ice frequently. Has not had any recent dizziness.     10/10/2023: States she is trying to get over bronchitis. Her ears have felt achy for the past few days. Felt increasing atrovent helped but that her nose still runs. Has MQT testing scheduled with allergist on Monday.    3/12/24: States she still has bothersome rhinitis at times. This could be exacerbated by temperature changes or allergen exposure. She has an appt with Dr. Magana tomorrow and plans to begin immunotherapy. Feels combination of astelin & flonase may work better than atrovent with flonase. Reports she also has PND at times. Has taken omeprazole daily for many years, is unsure that it makes a difference in GERD symptoms.    12/20/2024: Reports nasal symptoms are much better with atrovent & immunotherapy. She began having some  intermittent otalgia and throat pain a couple days ago, and thinks she may be developing a cold.    ROS:   10-point review of systems negative except per HPI      Review of patient's allergies indicates:   Allergen Reactions    Adhesive tape-silicones        Past Medical History:   Diagnosis Date    Anxiety     CKD (chronic kidney disease)     Depression     Diabetes mellitus, type 2     HLD (hyperlipidemia)     Hypertension     Obesity, unspecified     Unspecified glaucoma        Past Surgical History:   Procedure Laterality Date    CARPAL TUNNEL RELEASE      CARPAL TUNNEL RELEASE Right 07/06/2016    COLONOSCOPY  08/22/2014    HYSTERECTOMY      tendon sheath incision      TONSILLECTOMY      TUBAL LIGATION         Social History     Socioeconomic History    Marital status: Single   Tobacco Use    Smoking status: Never    Smokeless tobacco: Never    Tobacco comments:     None   Substance and Sexual Activity    Alcohol use: Not Currently     Comment: None    Drug use: Never    Sexual activity: Not Currently     Birth control/protection: None     Social Determinants of Health     Physical Activity: Inactive (8/4/2022)    Exercise Vital Sign     Days of Exercise per Week: 0 days     Minutes of Exercise per Session: 0 min       Family History   Problem Relation Age of Onset    Cancer Mother     Cancer Father         Throte caner    Hypertension Father     Diabetes Paternal Grandmother     Diabetes Maternal Grandmother        Outpatient Encounter Medications as of 10/10/2023   Medication Sig Dispense Refill    ACCU-CHEK GUIDE TEST STRIPS Strp CHECK GLUCOSE TWICE A DAY      acyclovir 5% (ZOVIRAX) 5 % Crea Apply topically 5 (five) times daily. 5 g 1    aspirin (ECOTRIN) 81 MG EC tablet Take 81 mg by mouth once daily.      atorvastatin (LIPITOR) 20 MG tablet Take 1 tablet (20 mg total) by mouth once daily. 90 tablet 2    azelastine (ASTELIN) 137 mcg (0.1 %) nasal spray USE 2 SPRAYS IN EACH NOSTRIL ONCE DAILY 30 mL 5    BD  "NORMA 2ND GEN PEN NEEDLE 32 gauge x 5/32" Ndle USE DAILY TO INJECT INSULIN 100 each 11    BD ULTRA-FINE SHORT PEN NEEDLE 31 gauge x 5/16" Ndle USE DAILY AS DIRECTED      blood sugar diagnostic (ACCU-CHEK GUIDE TEST STRIPS MISC)   ACCU-CHEK GUIDE TEST STRIP, See Instructions, USE ONE STRIP TO TEST BLOOD GLUCOSE TWICE DAILY, # 50 unknown unit, 3 Refill(s), Pharmacy: [id number], 158, cm, Height/Length Dosing, 21 13:08:00 CST, 85.9, kg, Weight Dosing, 21 13:08:00 CST      cyclobenzaprine (FLEXERIL) 10 MG tablet Take 1 tablet (10 mg total) by mouth nightly as needed for Muscle spasms. 15 tablet 1    diclofenac sodium (VOLTAREN) 1 % Gel APPLY 2 GRAMS TO AFFECTED AREA 3 TIMES A DAY      DULoxetine (CYMBALTA) 60 MG capsule Take 60 mg by mouth nightly.      famotidine (PEPCID) 40 MG tablet Take 1 tablet (40 mg total) by mouth once daily. 30 tablet 3    fluconazole (DIFLUCAN) 100 MG tablet Take 1 tablet (100 mg total) by mouth Every 3 (three) days. 3 tablet 1    fluticasone propionate (FLONASE) 50 mcg/actuation nasal spray 2 sprays by Each Nostril route 2 (two) times daily.      glimepiride (AMARYL) 2 MG tablet TAKE 1 TABLET BY MOUTH EVERY DAY WITH THE FIRST MEAL OF THE DAY 90 tablet 3    [] guaiFENesin-codeine 100-10 mg/5 ml (TUSSI-ORGANIDIN NR)  mg/5 mL syrup Take 5 mLs by mouth 3 (three) times daily as needed for Cough. 120 mL 0    insulin (LANTUS SOLOSTAR U-100 INSULIN) glargine 100 units/mL SubQ pen Inject 18 Units into the skin once daily. 15 each 6    ipratropium (ATROVENT) 21 mcg (0.03 %) nasal spray SPRAY 1 SPRAY IN EACH NOSTRIL 4 (FOUR) TIMES DAILY AS NEEDED FOR RUNNY NOSE 30 mL 5    JARDIANCE 25 mg tablet Take 25 mg by mouth once daily.      lisinopriL (PRINIVIL,ZESTRIL) 5 MG tablet Take 1 tablet (5 mg total) by mouth once daily. 90 tablet 2    loratadine (CLARITIN) 10 mg tablet Take 1 tablet (10 mg total) by mouth once daily. 30 tablet 11    medical supply, miscellaneous (0.2 MICRON " "FILTER ATTACHMENT MISC)   BD UF short Pen Needle 8MMx 31g, See Instructions, Use daily to inject insulin' Dx Code E11.9 SAMMIE 99 mon, # 100 EA, 11 Refill(s), Pharmacy: [facility]/pharm[facility] #[id number], 157, cm, Height/Length Dosing, 06/10/21 13:27:00 CDT, 85.4, kg, Weight Dosing, 06/10/21 1...      metoprolol succinate (TOPROL-XL) 25 MG 24 hr tablet Take 1 tablet (25 mg total) by mouth once daily. 90 tablet 2    mupirocin (BACTROBAN) 2 % ointment Apply topically 3 (three) times daily. 22 g 1    nitroGLYCERIN (NITROSTAT) 0.4 MG SL tablet Place 0.4 mg under the tongue.      ondansetron (ZOFRAN) 4 MG tablet Take 1 tablet (4 mg total) by mouth every 6 (six) hours. 12 tablet 0    pen needle, diabetic 32 gauge x 5/32" Ndle BD yanelis insulin pen needle for once a day injections 90 each 3    pregabalin (LYRICA) 75 MG capsule Take 75 mg by mouth 3 (three) times daily.      [] scopolamine (TRANSDERM-SCOP) 1.3-1.5 mg (1 mg over 3 days) Place 1 patch onto the skin every 72 hours. for 6 days 2 patch 0    tirzepatide (MOUNJARO) 5 mg/0.5 mL PnIj Inject 5 mg into the skin every 7 days. 4 pen 0    tirzepatide 10 mg/0.5 mL PnIj Inject 10 mg into the skin every 7 days. 4 pen 5    tirzepatide 7.5 mg/0.5 mL PnIj Inject 7.5 mg into the skin every 7 days. 4 pen 0    traZODone (DESYREL) 50 MG tablet Take 0.5 tablets (25 mg total) by mouth nightly as needed for Insomnia. 30 tablet 3    tretinoin (RETIN-A) 0.05 % cream Apply topically every evening. 45 g 3    [DISCONTINUED] acyclovir 5% (ZOVIRAX) 5 % ointment APPLY TO AFFECTED AREA EVERY 4 HOURS       No facility-administered encounter medications on file as of 10/10/2023.       Physical Exam:  There were no vitals filed for this visit.    General: NAD, voice normal  Neuro: AAO, CN II - XII grossly intact  Head/ Face: NCAT, symmetric, sensations intact bilaterally.   Eyes: EOMI, PERRL  Ears: externally normal with grossly normal hearing  AD: EAC with some obstructive cerumen- atraumatic removal under " microscopy with suction- TM intact, no middle ear effusion, no retractions  AS: EAC with some obstructive cerumen- atraumatic removal under microscopy with suction- TM intact, no middle ear effusion, no retractions  Nose: bilateral nares patent, midline septum, clear rhinorrhea, no external deformity, +ITH (improved)  OC/OP: MMM, no intraoral lesions, no trismus, dentition is moderate, no uvular deviation, bilaterally symmetric soft palate elevation, palatoglossus and palatopharyngeal fold wnl; tonsils are symmetric and 1+  Indirect laryngoscopy: deferred due to patient intolerance  Neck: soft, supple, no LAD, normal ROM, no thyromegaly  Respiratory: nonlabored, no wheezing, bilateral chest rise  Cardiovascular: RRR  Gastrointestinal: S NT ND  Skin: warm, no lesions  Musculoskeletal: 5/5 strength  Psych: Appropriate affect/mood      Pertinent Data:  ? LABS:  ? AUDIO:             ? PATH:      Imaging:   I personally reviewed the following images:        Assessment/Plan:  62 y.o. female with right BPPV (resolved), AR, chronic rhinitis. Audio with b/l HFSNHL. Hx of TMJ. RAST with positives to dust and juniper. Reassurance provided that ear exam is WNL.  - Annual audio  - NSI 1-2x/day  - Continue zyrtec   - Continue flonase & astelin BID  - Cont atrovent QID prn   - Continue allergy f/u, immunotherapy  - RTC 12mo     Mable Alcocer NP

## 2024-12-20 ENCOUNTER — OFFICE VISIT (OUTPATIENT)
Dept: OTOLARYNGOLOGY | Facility: CLINIC | Age: 64
End: 2024-12-20
Payer: MEDICARE

## 2024-12-20 VITALS — DIASTOLIC BLOOD PRESSURE: 68 MMHG | SYSTOLIC BLOOD PRESSURE: 103 MMHG | TEMPERATURE: 98 F | HEART RATE: 66 BPM

## 2024-12-20 DIAGNOSIS — R09.81 NASAL CONGESTION: ICD-10-CM

## 2024-12-20 DIAGNOSIS — J30.9 ALLERGIC RHINITIS, UNSPECIFIED SEASONALITY, UNSPECIFIED TRIGGER: Primary | ICD-10-CM

## 2024-12-20 DIAGNOSIS — H90.3 SENSORINEURAL HEARING LOSS (SNHL) OF BOTH EARS: ICD-10-CM

## 2024-12-20 DIAGNOSIS — H61.23 BILATERAL IMPACTED CERUMEN: ICD-10-CM

## 2024-12-20 DIAGNOSIS — J31.0 CHRONIC RHINITIS: ICD-10-CM

## 2024-12-20 DIAGNOSIS — H92.09 OTALGIA, UNSPECIFIED LATERALITY: ICD-10-CM

## 2024-12-20 PROCEDURE — 99214 OFFICE O/P EST MOD 30 MIN: CPT | Mod: PBBFAC | Performed by: NURSE PRACTITIONER

## 2024-12-20 RX ORDER — IPRATROPIUM BROMIDE 21 UG/1
1 SPRAY, METERED NASAL 3 TIMES DAILY
Qty: 90 ML | Refills: 3 | Status: SHIPPED | OUTPATIENT
Start: 2024-12-20

## 2024-12-20 RX ORDER — AZELASTINE 1 MG/ML
1 SPRAY, METERED NASAL 2 TIMES DAILY
Qty: 90 ML | Refills: 3 | Status: SHIPPED | OUTPATIENT
Start: 2024-12-20

## 2024-12-20 RX ORDER — LORATADINE 10 MG/1
10 TABLET ORAL DAILY
Qty: 90 TABLET | Refills: 3 | Status: SHIPPED | OUTPATIENT
Start: 2024-12-20 | End: 2025-12-20

## 2025-01-10 ENCOUNTER — CLINICAL SUPPORT (OUTPATIENT)
Dept: AUDIOLOGY | Facility: HOSPITAL | Age: 65
End: 2025-01-10
Payer: MEDICARE

## 2025-01-10 DIAGNOSIS — R42 VERTIGO: Primary | ICD-10-CM

## 2025-01-10 NOTE — PROGRESS NOTES
Hallpike screening:    Ms. Vasquez reports episodic vertigo mainly provoked when getting in and out of bed.  She has a history of BPPV and is concerned it has returned.  She also states that she had a recent sinus infection which is when her vertigo started which now seems to be improving.    Hallpike screening was negative to the right and left side ruling out BPPV.    Because patient states her vertigo seems to be improving, I did not recommend ENT follow up at this time. I encouraged to call if problem worsens and she verbalized understanding.    Brigitte Chambers, AuD

## 2025-02-12 DIAGNOSIS — Z79.4 TYPE 2 DIABETES MELLITUS WITHOUT COMPLICATION, WITH LONG-TERM CURRENT USE OF INSULIN: Primary | Chronic | ICD-10-CM

## 2025-02-12 DIAGNOSIS — E11.9 TYPE 2 DIABETES MELLITUS WITHOUT COMPLICATION, WITH LONG-TERM CURRENT USE OF INSULIN: Primary | Chronic | ICD-10-CM

## 2025-02-12 RX ORDER — EMPAGLIFLOZIN 25 MG/1
25 TABLET, FILM COATED ORAL EVERY MORNING
Qty: 30 TABLET | Refills: 3 | Status: SHIPPED | OUTPATIENT
Start: 2025-02-12

## 2025-03-20 ENCOUNTER — LAB VISIT (OUTPATIENT)
Dept: LAB | Facility: HOSPITAL | Age: 65
End: 2025-03-20
Attending: INTERNAL MEDICINE
Payer: MEDICARE

## 2025-03-20 DIAGNOSIS — E78.00 PURE HYPERCHOLESTEROLEMIA: ICD-10-CM

## 2025-03-20 DIAGNOSIS — E11.9 DIABETES MELLITUS WITHOUT COMPLICATION: ICD-10-CM

## 2025-03-20 DIAGNOSIS — E03.9 MYXEDEMA HEART DISEASE: ICD-10-CM

## 2025-03-20 DIAGNOSIS — E55.9 AVITAMINOSIS D: ICD-10-CM

## 2025-03-20 DIAGNOSIS — I51.9 MYXEDEMA HEART DISEASE: ICD-10-CM

## 2025-03-20 DIAGNOSIS — R80.9 PROTEINURIA, UNSPECIFIED TYPE: Primary | ICD-10-CM

## 2025-03-20 LAB
ALBUMIN SERPL-MCNC: 4.5 G/DL (ref 3.4–5)
ALBUMIN/GLOB SERPL: 1.5 RATIO
ALP SERPL-CCNC: 56 UNIT/L (ref 50–144)
ALT SERPL-CCNC: 12 UNIT/L (ref 1–45)
ANION GAP SERPL CALC-SCNC: 4 MEQ/L (ref 2–13)
AST SERPL-CCNC: 21 UNIT/L (ref 14–36)
BASOPHILS # BLD AUTO: 0.04 X10(3)/MCL (ref 0.01–0.08)
BASOPHILS NFR BLD AUTO: 0.6 % (ref 0.1–1.2)
BILIRUB SERPL-MCNC: 0.5 MG/DL (ref 0–1)
BUN SERPL-MCNC: 9 MG/DL (ref 7–20)
CALCIUM SERPL-MCNC: 9.4 MG/DL (ref 8.4–10.2)
CHLORIDE SERPL-SCNC: 107 MMOL/L (ref 98–110)
CHOLEST SERPL-MCNC: 130 MG/DL (ref 0–200)
CO2 SERPL-SCNC: 27 MMOL/L (ref 21–32)
CREAT SERPL-MCNC: 1.01 MG/DL (ref 0.66–1.25)
CREAT/UREA NIT SERPL: 9 (ref 12–20)
EOSINOPHIL # BLD AUTO: 0.07 X10(3)/MCL (ref 0.04–0.36)
EOSINOPHIL NFR BLD AUTO: 1 % (ref 0.7–7)
ERYTHROCYTE [DISTWIDTH] IN BLOOD BY AUTOMATED COUNT: 12.3 % (ref 11–14.5)
EST. AVERAGE GLUCOSE BLD GHB EST-MCNC: 131.2 MG/DL (ref 70–115)
GFR SERPLBLD CREATININE-BSD FMLA CKD-EPI: 62 ML/MIN/1.73/M2
GLOBULIN SER-MCNC: 3 GM/DL (ref 2–3.9)
GLUCOSE SERPL-MCNC: 81 MG/DL (ref 70–115)
HBA1C MFR BLD: 6.2 % (ref 4–6)
HCT VFR BLD AUTO: 41.4 % (ref 36–48)
HDLC SERPL-MCNC: 69 MG/DL (ref 40–60)
HGB BLD-MCNC: 13.5 G/DL (ref 11.8–16)
IMM GRANULOCYTES # BLD AUTO: 0.02 X10(3)/MCL (ref 0–0.03)
IMM GRANULOCYTES NFR BLD AUTO: 0.3 % (ref 0–0.5)
LDLC SERPL DIRECT ASSAY-SCNC: 41.9 MG/DL (ref 30–100)
LYMPHOCYTES # BLD AUTO: 2.93 X10(3)/MCL (ref 1.16–3.74)
LYMPHOCYTES NFR BLD AUTO: 40.5 % (ref 20–55)
MCH RBC QN AUTO: 28.8 PG (ref 27–34)
MCHC RBC AUTO-ENTMCNC: 32.6 G/DL (ref 31–37)
MCV RBC AUTO: 88.3 FL (ref 79–99)
MONOCYTES # BLD AUTO: 0.53 X10(3)/MCL (ref 0.24–0.36)
MONOCYTES NFR BLD AUTO: 7.3 % (ref 4.7–12.5)
NEUTROPHILS # BLD AUTO: 3.64 X10(3)/MCL (ref 1.56–6.13)
NEUTROPHILS NFR BLD AUTO: 50.3 % (ref 37–73)
NRBC BLD AUTO-RTO: 0 %
PLATELET # BLD AUTO: 235 X10(3)/MCL (ref 140–371)
PMV BLD AUTO: 10.6 FL (ref 9.4–12.4)
POTASSIUM SERPL-SCNC: 4.1 MMOL/L (ref 3.5–5.1)
PROT SERPL-MCNC: 7.5 GM/DL (ref 6.3–8.2)
RBC # BLD AUTO: 4.69 X10(6)/MCL (ref 4–5.1)
SODIUM SERPL-SCNC: 138 MMOL/L (ref 136–145)
TRIGL SERPL-MCNC: 87 MG/DL (ref 30–200)
TSH SERPL-ACNC: 1.33 UIU/ML (ref 0.36–3.74)
WBC # BLD AUTO: 7.23 X10(3)/MCL (ref 4–11.5)

## 2025-03-20 PROCEDURE — 84443 ASSAY THYROID STIM HORMONE: CPT

## 2025-03-20 PROCEDURE — 36415 COLL VENOUS BLD VENIPUNCTURE: CPT

## 2025-03-20 PROCEDURE — 85025 COMPLETE CBC W/AUTO DIFF WBC: CPT

## 2025-03-20 PROCEDURE — 80061 LIPID PANEL: CPT

## 2025-03-20 PROCEDURE — 83036 HEMOGLOBIN GLYCOSYLATED A1C: CPT

## 2025-03-20 PROCEDURE — 80053 COMPREHEN METABOLIC PANEL: CPT

## 2025-03-20 PROCEDURE — 82306 VITAMIN D 25 HYDROXY: CPT

## 2025-03-21 LAB — 25(OH)D3+25(OH)D2 SERPL-MCNC: 57 NG/ML (ref 30–80)

## 2025-04-09 ENCOUNTER — HOSPITAL ENCOUNTER (OUTPATIENT)
Dept: RADIOLOGY | Facility: HOSPITAL | Age: 65
Discharge: HOME | End: 2025-04-09
Attending: INTERNAL MEDICINE
Payer: MEDICARE

## 2025-04-09 DIAGNOSIS — Z12.31 SCREENING MAMMOGRAM, ENCOUNTER FOR: ICD-10-CM

## 2025-04-09 PROCEDURE — 77067 SCR MAMMO BI INCL CAD: CPT | Mod: TC

## 2025-05-19 ENCOUNTER — OFFICE VISIT (OUTPATIENT)
Dept: ENDOCRINOLOGY | Facility: CLINIC | Age: 65
End: 2025-05-19
Payer: MEDICARE

## 2025-05-19 VITALS
WEIGHT: 144.81 LBS | RESPIRATION RATE: 16 BRPM | BODY MASS INDEX: 26.65 KG/M2 | HEIGHT: 62 IN | DIASTOLIC BLOOD PRESSURE: 80 MMHG | TEMPERATURE: 97 F | HEART RATE: 62 BPM | SYSTOLIC BLOOD PRESSURE: 134 MMHG

## 2025-05-19 DIAGNOSIS — N28.9 NEPHROPATHY: ICD-10-CM

## 2025-05-19 DIAGNOSIS — E11.9 TYPE 2 DIABETES MELLITUS WITHOUT COMPLICATION, WITH LONG-TERM CURRENT USE OF INSULIN: Primary | ICD-10-CM

## 2025-05-19 DIAGNOSIS — B37.9 CANDIDA INFECTION: ICD-10-CM

## 2025-05-19 DIAGNOSIS — G62.9 NEUROPATHY: ICD-10-CM

## 2025-05-19 DIAGNOSIS — Z79.4 TYPE 2 DIABETES MELLITUS WITHOUT COMPLICATION, WITH LONG-TERM CURRENT USE OF INSULIN: Primary | ICD-10-CM

## 2025-05-19 LAB
CREAT UR-MCNC: 145.9 MG/DL (ref 45–106)
MICROALBUMIN UR-MCNC: 6.4 UG/ML
MICROALBUMIN/CREAT RATIO PNL UR: 4.4 MG/GM CR (ref 0–30)

## 2025-05-19 PROCEDURE — 3288F FALL RISK ASSESSMENT DOCD: CPT | Mod: CPTII,,, | Performed by: NURSE PRACTITIONER

## 2025-05-19 PROCEDURE — 1125F AMNT PAIN NOTED PAIN PRSNT: CPT | Mod: CPTII,,, | Performed by: NURSE PRACTITIONER

## 2025-05-19 PROCEDURE — 1101F PT FALLS ASSESS-DOCD LE1/YR: CPT | Mod: CPTII,,, | Performed by: NURSE PRACTITIONER

## 2025-05-19 PROCEDURE — 1159F MED LIST DOCD IN RCRD: CPT | Mod: CPTII,,, | Performed by: NURSE PRACTITIONER

## 2025-05-19 PROCEDURE — 1160F RVW MEDS BY RX/DR IN RCRD: CPT | Mod: CPTII,,, | Performed by: NURSE PRACTITIONER

## 2025-05-19 PROCEDURE — 82043 UR ALBUMIN QUANTITATIVE: CPT | Performed by: NURSE PRACTITIONER

## 2025-05-19 PROCEDURE — 3079F DIAST BP 80-89 MM HG: CPT | Mod: CPTII,,, | Performed by: NURSE PRACTITIONER

## 2025-05-19 PROCEDURE — 99215 OFFICE O/P EST HI 40 MIN: CPT | Mod: PBBFAC | Performed by: NURSE PRACTITIONER

## 2025-05-19 PROCEDURE — 3008F BODY MASS INDEX DOCD: CPT | Mod: CPTII,,, | Performed by: NURSE PRACTITIONER

## 2025-05-19 PROCEDURE — 3075F SYST BP GE 130 - 139MM HG: CPT | Mod: CPTII,,, | Performed by: NURSE PRACTITIONER

## 2025-05-19 PROCEDURE — 99214 OFFICE O/P EST MOD 30 MIN: CPT | Mod: S$PBB,,, | Performed by: NURSE PRACTITIONER

## 2025-05-19 PROCEDURE — 3044F HG A1C LEVEL LT 7.0%: CPT | Mod: CPTII,,, | Performed by: NURSE PRACTITIONER

## 2025-05-19 PROCEDURE — 4010F ACE/ARB THERAPY RXD/TAKEN: CPT | Mod: CPTII,,, | Performed by: NURSE PRACTITIONER

## 2025-05-19 RX ORDER — FLUCONAZOLE 150 MG/1
150 TABLET ORAL DAILY
Qty: 1 TABLET | Refills: 1 | Status: SHIPPED | OUTPATIENT
Start: 2025-05-19 | End: 2025-05-19 | Stop reason: SDUPTHER

## 2025-05-19 RX ORDER — FLUCONAZOLE 150 MG/1
150 TABLET ORAL ONCE
Qty: 1 TABLET | Refills: 1 | Status: SHIPPED | OUTPATIENT
Start: 2025-05-19 | End: 2025-05-19

## 2025-05-19 NOTE — PROGRESS NOTES
Subjective     Patient ID: Pedro Vergara is a 64 y.o. female.    Chief Complaint: Diabetes        03/05/2021 Endocrine Clinic Note: Ms. Pedro Vergara is a 58 yo F with PMHx of T2DM, vitamin D deficiency, CKD stage III, and hypertension. She presents today for follow-up regarding diabetes management. She is currently taking Victoza 1.8, glimepiride 4, Lantus 25U, and Jardiance 10 in the am. She states that she checks blood glucose at once in the morning and once at night and endorses that she often has hypoglycemia in the mornings reaching blood glucoses in the 60s. Other times she states that her blood glucose is higher reaching around the 140s-150s. She is compliant with her diabetes medication regimen and expresses that her diet is much improved from her previous visit, eating more vegetables and healthy meats. She admits that she does not always eat breakfast, which might be contributing to her hypoglycemic episodes. Today, she says she is feeling fatigued, which she attributes to her recent COVID vaccine and hypoglycemia. (1) -Last A1C on 3/3/21 7.5% (down 8.8% in 12/2020), trending in proper direction, A1C goal <7%  -Continue Lantus 25U, Victoza 1.8 mg  -Decrease glimepiride to 2mg at breakfast with none at night given hypoglycemic episodes; if pt does not eat breakfast, instructed to take glimepiride with largest meal  -Increase Jardiance to 25 mg in am given still having hyperglycemic episodes  -Maintain fasting glucose between  and post-prandial glucose <180  -Counseled patient on importance of bringing glucose log to next visit, patient agreeable  -Encouraged patient to continue with healthy diet and to ensure she eats to prevent hypoglycemic episodes (2)     06/10/2021 Endocrine Clinic Note: 60-year-old female scheduled today as endocrine clinic follow-up.  History of uncontrolled type 2 diabetes, CKD stage III, hypertension, hyperlipidemia, vitamin D deficiency.  Patient was previously  referred to endocrine clinic is here today for follow-up visit.  Current A1c 7.9 increased from Previous A1c 7.5,  previous 8.8 and 11.0.  Patient has only checks CBG's about once a week range of 110-157.  Patient denies any symptoms of hypoglycemia.  On patient's previous visit glimepiride was changed from twice a day to once in the morning due to a.m. hypoglycemia which has resolved.  Patient was started on Victoza and was to titrate up as stated currently she is on 1.2 mg.  Hypertension at goal today.  CKD/nephropathy Patient is seen by renal clinic previous renal functions GFR 55, creatinine 1.26, BUN 16 improved from previous renal functions on 8/11/2020 GFR 42, creatinine 42, BUN 1.6.  Neuropathy patient was put on gabapentin and sees a podiatrist but was recently referred to neurologist due to sciatica. (1)     10/11/2021 Endocrine Clinic Note: 60-year-old female scheduled today for endocrine clinic follow-up.  History of uncontrolled type 2 diabetes, CKD stage III, hypertension, hyperlipidemia and vitamin D deficiency.  Uncontrolled type 2 diabetes current A1c 7.2 improved from previous 7.9, 7.5, 8.8 and 11.0. Patient checks CBGs daily fasting ranges .  Patient has frequent hypoglycemia in the a.m.  When discussing with patient patient needs at 6 PM and at times does not eat until the next day at noon.  Discussed with patient today eating breakfast in the morning.  Also DM education will discussed with patient today eating 3 meals per day. Neuropathy patient is currently on gabapentin.  Nephropathy urine micro elevated 10/27/2020 on lisinopril 5 mg repeat urine micro today.  Hypertension at goal. CKD patient is followed by renal clinic.  Patient has fever blisters breaking out around her mouth previously renal clinic is giving her Valtrex 1 g x 2 doses longer dosing not recommended due to renal functions.  Nephropathy urine micro mild elevation 12/30/2021 patient is currently on low-dose ACE.   Neuropathy discussed with patient controlling diabetes.     Endocrine clinic note 08/04/2022: 61-year-old female scheduled today for endocrine clinic follow-up.  History of uncontrolled type 2 diabetes with hypoglycemia, CKD stage 3, hypertension, hyperlipidemia vitamin-D deficiency.  Uncontrolled type 2 diabetes current A1c 7.5 previous 7.2 and 7.9.  Patient checks CBG is 1-2 times per day CBG's range fasting  in the am.  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  Will need patient's CGM alert patient hypoglycemia.  Patient reports current A1c slightly increased due to her being on a cruise recently and states she being large meals and lots of meals overnight on ADA diet.  She returned from the cruise a few days ago.     Endocrine clinic note 12/20/2022: 61 Female scheduled today as endocrine clinic follow-up.  History of type 2 diabetes, CKD stage 3 with nephropathy/neuropathy, hypertension, hyperlipidemia vitamin-D deficiency.  Type 2 diabetes current A1c 8.4 previous A1C 7.5, 7.6 and 7.2.  On patient's previous visit patient was having hypoglycemia on basal insulin was decreased from 22 units to 18 units.  Patient continued to have hypoglycemia Lantus was decreased to 12 units.  Patient had increasing A1c in the last month and a half patient has had severe vertigo and has not been active in his closely staying in bed due to severe vertigo.  Patient is in walk-in clinic and states she was giving doses of meclizine which helped but she ran out.  Patient also reports popping to bilateral ears patient has a prescription and Zyrtec but has not been taking.  Instructed patient to start Zyrtec daily use meclizine as needed and will refer her to ENT clinic.  Patient checks CBGS 3-4  times per week ranges 76 to 170. Nephropathy patient is currently on an Ace urine micro on 08/04/2022 normal.  Patient has CKD stage 3 and is currently seen in renal Clinic encouraged patient to keep all appointments.  Patient has had recent improvement in kidney function.  Neuropathy foot exam done 08/14/2022.      Endocrine clinic note 06/01/2023:  62-year-old female scheduled today for endocrine clinic follow-up.  History of uncontrolled type 2 diabetes with CKD stage 3 with nephropathy, neuropathy, hypertension, hyperlipidemia vitamin-D deficiency.  Type 2 diabetes current A1c increased to 10.9 from previous 8.4 and 7.5.  Patient states she initially had appetite control on Trulicity was eating better but no longer his appetite control and has started eating more foods outside of ADA diet.  She states compliance with medication.  She denies any hypoglycemia.  Previously Trulicity working uncontrolled appetite and also patient's diabetes.  Patient also reports darkness in the her eyes with small pimples states previously she was on based cream that helped improved patient had a picture tretinoin cream.      Endocrine clinic note 11/02/2023:  62 year female scheduled today for endocrine clinic follow-up.  History of uncontrolled type 2 diabetes CKD stage 3 with nephropathy, neuropathy, hypertension, hyperlipidemia and vitamin-D deficiency.  Type 2 diabetes current A1C 7.0 Previous A1c 10.9, 8.4 and 7.5.  Previously patient had lost appetite control on Trulicity and was eating more carbs starches non ADA diet and A1c increased to 10.9.  Patient was changed to Mounjaro has increase to Mounjaro 10 mg. Pt has improved A1C and 15 pound weight loss.  Patient denies any side effects to her medications and states compliance.  Foot exam performed today see documentation.  Neuropathy patient states improvement with controlled diabetes and also patient has improved kidney function with controlled diabetes.      Endocrine  clinic note 11/18/2024:  63 year female scheduled today for endocrine clinic follow-up.  History of type 2 diabetes with CKD and nephropathy and Neuropathy.  Type 2 diabetes current A1c 6.0.  Patient denies any hypoglycemia on reactions to her medications.  Neuropathy foot exam performed today see documentation.  Patient denies any neuropathy she states she does serve sciatica and has had multiple lower back injections.  Eye exam patient was seen in a new ophthalmologist's on Monday for her eye exam.  Nephropathy patient is currently lisinopril 10 mg recent urine micro mild elevation.     Endocrine clinic note 05/19/2025:  64 year female scheduled today for endocrine clinic follow-up.  History of type 2 diabetes with CKD and nephropathy and neuropathy.  Type 2 diabetes recent A1c 6.2.  Patient denies any hypoglycemia.  Patient reports a recent yeast infection. Patient given Diflucan 150 mg if recurrent yeast infections occur we will DC Jardiance.  Neuropathy foot exam performed 11/18/2024 no complaints today.  Nephropathy urine micro elevated on 10/08/2024 we will repeat urine micro today.                        Review of Systems   Constitutional:  Negative for activity change, appetite change and fatigue.   HENT:  Negative for dental problem, hearing loss, tinnitus, trouble swallowing and goiter.    Eyes:  Negative for photophobia, pain and visual disturbance.   Respiratory:  Negative for cough, chest tightness and wheezing.    Cardiovascular:  Negative for chest pain, palpitations and leg swelling.   Gastrointestinal:  Negative for abdominal pain, constipation, diarrhea, nausea and reflux.   Endocrine: Negative for cold intolerance, heat intolerance, polydipsia and polyphagia.   Genitourinary:  Negative for difficulty urinating, flank pain, hematuria, hot flashes, menstrual irregularity, menstrual problem, nocturia and urgency.   Musculoskeletal:  Negative for back pain, gait problem, joint swelling, leg pain and  joint deformity.   Integumentary:  Negative for color change, pallor, rash and breast discharge.   Allergic/Immunologic: Negative for environmental allergies, food allergies and immunocompromised state.   Neurological:  Negative for tremors, seizures, headaches, coordination difficulties and memory loss.   Psychiatric/Behavioral:  Negative for agitation, behavioral problems and sleep disturbance. The patient is not nervous/anxious.           Objective     Physical Exam  Constitutional:       General: She is not in acute distress.     Appearance: Normal appearance. She is not ill-appearing.   HENT:      Head: Normocephalic and atraumatic.      Right Ear: External ear normal.      Left Ear: External ear normal.      Nose: Nose normal. No congestion or rhinorrhea.      Mouth/Throat:      Mouth: Mucous membranes are moist.      Pharynx: Oropharynx is clear. No oropharyngeal exudate.   Eyes:      General:         Right eye: No discharge.         Left eye: No discharge.      Conjunctiva/sclera: Conjunctivae normal.      Pupils: Pupils are equal, round, and reactive to light.   Neck:      Thyroid: No thyroid mass, thyromegaly or thyroid tenderness.   Cardiovascular:      Rate and Rhythm: Normal rate and regular rhythm.      Pulses: Normal pulses.      Heart sounds: Normal heart sounds. No murmur heard.  Pulmonary:      Effort: Pulmonary effort is normal. No respiratory distress.      Breath sounds: Normal breath sounds.   Abdominal:      General: Abdomen is flat. Bowel sounds are normal. There is no distension.      Palpations: Abdomen is soft.      Tenderness: There is no abdominal tenderness.   Musculoskeletal:         General: No swelling or tenderness. Normal range of motion.      Cervical back: Normal range of motion and neck supple. No tenderness.      Right lower leg: No edema.      Left lower leg: No edema.   Feet:      Right foot:      Skin integrity: Skin integrity normal.      Left foot:      Skin integrity:  Skin integrity normal.   Lymphadenopathy:      Cervical: No cervical adenopathy.   Skin:     General: Skin is warm and dry.      Coloration: Skin is not jaundiced or pale.   Neurological:      General: No focal deficit present.      Mental Status: She is alert and oriented to person, place, and time. Mental status is at baseline.      Coordination: Coordination normal.      Gait: Gait normal.   Psychiatric:         Mood and Affect: Mood normal.         Behavior: Behavior normal.         Thought Content: Thought content normal.            Assessment and Plan     1. Type 2 diabetes mellitus without complication, with long-term current use of insulin  Current A1C 6.2   Medications: Glimepiride 2 mg Qday, Jardiance 25mg Qday, Mounjaro 15 mg q week  Lantus 18 units  Changes: none   Diabetic Eye Exam:11/25/2024    Diabetic Foot Exam:  11/18/2024  Component  Ref Range & Units (hover) 2 mo ago  (3/20/25) 7 mo ago  (10/8/24) 1 yr ago  (4/4/24) 1 yr ago  (2/12/24) 2 yr ago  (12/30/22) 3 yr ago  (2/11/22) 3 yr ago  (9/23/21)   Hemoglobin A1c 6.2 High  6.0 6.4 High  6.9 High  8.6 High  R, CM 7.5 R 7.2 High  R   Estimated Average Glucose 131.2 High  125.5 High  137.0 High  151.3 High  200.1 R 168.6 R 159.9 R   Resulting Agency [facility]        2. Neuropathy  Foot exam 11/18/2024     3. Nephropathy  On lisinopril 10 mg             Component  Ref Range & Units (hover) 7 mo ago  (10/8/24) 1 yr ago  (10/9/23) 1 yr ago  (8/29/23) 1 yr ago  (6/5/23) 2 yr ago  (12/30/22) 2 yr ago  (8/4/22) 3 yr ago  (2/11/22)   Urine Microalbumin 7.7   <5.0 R 5.6 R, CM <5.0 R <5.0 R   Urine Creatinine 141.7 High  104.1 R 110.7 R 91.8 R 34.8 Low  R, CM 82.9 R 107.9 R   Microalbumin Creatinine Ratio 5.4   <5.4 16.1 <6.0 <4.6 R   Resulting Agency [facility]      -     Microalbumin/Creatinine Ratio, Urine    Candida infection  Diflucan x1  dose  -     fluconazole (DIFLUCAN) 150 MG Tab; Take 1 tablet (150 mg total) by mouth once daily. for 2 days  Dispense: 1 tablet; Refill: 1              Follow up in about 6 months (around 11/19/2025) for Type 2 diabetes.

## 2025-05-20 ENCOUNTER — RESULTS FOLLOW-UP (OUTPATIENT)
Dept: ENDOCRINOLOGY | Facility: CLINIC | Age: 65
End: 2025-05-20

## 2025-05-20 ENCOUNTER — TELEPHONE (OUTPATIENT)
Dept: ENDOCRINOLOGY | Facility: CLINIC | Age: 65
End: 2025-05-20
Payer: MEDICARE

## 2025-05-20 NOTE — TELEPHONE ENCOUNTER
----- Message from Bailey sent at 5/20/2025 11:52 AM CDT -----  Patient of Mary Christine Patient returning your call, please contact when you are available [id number]

## 2025-05-27 DIAGNOSIS — Z79.4 TYPE 2 DIABETES MELLITUS WITHOUT COMPLICATION, WITH LONG-TERM CURRENT USE OF INSULIN: Chronic | ICD-10-CM

## 2025-05-27 DIAGNOSIS — E11.9 TYPE 2 DIABETES MELLITUS WITHOUT COMPLICATION, WITH LONG-TERM CURRENT USE OF INSULIN: Chronic | ICD-10-CM

## 2025-05-27 RX ORDER — EMPAGLIFLOZIN 25 MG/1
25 TABLET, FILM COATED ORAL EVERY MORNING
Qty: 90 TABLET | Refills: 5 | Status: SHIPPED | OUTPATIENT
Start: 2025-05-27

## 2025-05-29 RX ORDER — FAMOTIDINE 40 MG/1
40 TABLET, FILM COATED ORAL NIGHTLY
Qty: 90 TABLET | Refills: 0 | Status: SHIPPED | OUTPATIENT
Start: 2025-05-29

## 2025-08-22 RX ORDER — FAMOTIDINE 40 MG/1
40 TABLET, FILM COATED ORAL NIGHTLY
Qty: 90 TABLET | Refills: 0 | Status: SHIPPED | OUTPATIENT
Start: 2025-08-22

## (undated) DEVICE — KIT SURGICAL COLON .25 1.1OZ

## (undated) DEVICE — TRAP ETRAP POLYP 50 TRAY

## (undated) DEVICE — SOL IRRI STRL WATER 1000ML

## (undated) DEVICE — MANIFOLD 4 PORT

## (undated) DEVICE — SNARE EXACTO COLD